# Patient Record
Sex: MALE | Race: WHITE | NOT HISPANIC OR LATINO | Employment: FULL TIME | URBAN - METROPOLITAN AREA
[De-identification: names, ages, dates, MRNs, and addresses within clinical notes are randomized per-mention and may not be internally consistent; named-entity substitution may affect disease eponyms.]

---

## 2017-01-11 ENCOUNTER — APPOINTMENT (OUTPATIENT)
Dept: GENERAL RADIOLOGY | Facility: HOSPITAL | Age: 57
End: 2017-01-11

## 2017-01-11 ENCOUNTER — OFFICE VISIT (OUTPATIENT)
Dept: FAMILY MEDICINE CLINIC | Facility: CLINIC | Age: 57
End: 2017-01-11

## 2017-01-11 ENCOUNTER — HOSPITAL ENCOUNTER (INPATIENT)
Facility: HOSPITAL | Age: 57
LOS: 3 days | Discharge: HOME | End: 2017-01-14
Attending: EMERGENCY MEDICINE | Admitting: INTERNAL MEDICINE

## 2017-01-11 ENCOUNTER — APPOINTMENT (OUTPATIENT)
Dept: CT IMAGING | Facility: HOSPITAL | Age: 57
End: 2017-01-11

## 2017-01-11 VITALS
DIASTOLIC BLOOD PRESSURE: 80 MMHG | BODY MASS INDEX: 48.32 KG/M2 | HEIGHT: 64 IN | TEMPERATURE: 98 F | OXYGEN SATURATION: 95 % | HEART RATE: 69 BPM | SYSTOLIC BLOOD PRESSURE: 118 MMHG | RESPIRATION RATE: 16 BRPM | WEIGHT: 283 LBS

## 2017-01-11 DIAGNOSIS — J11.00 PNEUMONIA DUE TO INFLUENZA: ICD-10-CM

## 2017-01-11 DIAGNOSIS — R55 NEAR SYNCOPE: ICD-10-CM

## 2017-01-11 DIAGNOSIS — I48.91 ATRIAL FIBRILLATION, UNSPECIFIED TYPE (HCC): ICD-10-CM

## 2017-01-11 DIAGNOSIS — J06.9 ACUTE URI: Primary | ICD-10-CM

## 2017-01-11 DIAGNOSIS — J40 BRONCHITIS: ICD-10-CM

## 2017-01-11 DIAGNOSIS — R53.1 WEAKNESS GENERALIZED: ICD-10-CM

## 2017-01-11 DIAGNOSIS — J18.9 PNEUMONIA OF LEFT LOWER LOBE DUE TO INFECTIOUS ORGANISM: ICD-10-CM

## 2017-01-11 DIAGNOSIS — N28.9 RENAL INSUFFICIENCY: Primary | ICD-10-CM

## 2017-01-11 LAB
ALBUMIN SERPL-MCNC: 4.5 G/DL (ref 3.5–5.2)
ALBUMIN/GLOB SERPL: 1.6 G/DL
ALP SERPL-CCNC: 83 U/L (ref 39–117)
ALT SERPL W P-5'-P-CCNC: 32 U/L (ref 1–41)
ANION GAP SERPL CALCULATED.3IONS-SCNC: 13.1 MMOL/L
AST SERPL-CCNC: 29 U/L (ref 1–40)
B PERT DNA SPEC QL NAA+PROBE: NOT DETECTED
BASOPHILS # BLD AUTO: 0.02 10*3/MM3 (ref 0–0.2)
BASOPHILS NFR BLD AUTO: 0.2 % (ref 0–1.5)
BILIRUB SERPL-MCNC: 0.5 MG/DL (ref 0.1–1.2)
BUN BLD-MCNC: 22 MG/DL (ref 6–20)
BUN/CREAT SERPL: 12.4 (ref 7–25)
C PNEUM DNA NPH QL NAA+NON-PROBE: NOT DETECTED
CALCIUM SPEC-SCNC: 9.5 MG/DL (ref 8.6–10.5)
CHLORIDE SERPL-SCNC: 101 MMOL/L (ref 98–107)
CO2 SERPL-SCNC: 28.9 MMOL/L (ref 22–29)
CREAT BLD-MCNC: 1.78 MG/DL (ref 0.76–1.27)
D-LACTATE SERPL-SCNC: 1.1 MMOL/L (ref 0.5–2)
DEPRECATED RDW RBC AUTO: 46.5 FL (ref 37–54)
EOSINOPHIL # BLD AUTO: 0.06 10*3/MM3 (ref 0–0.7)
EOSINOPHIL NFR BLD AUTO: 0.7 % (ref 0.3–6.2)
ERYTHROCYTE [DISTWIDTH] IN BLOOD BY AUTOMATED COUNT: 13.1 % (ref 11.5–14.5)
FLUAV H1 2009 PAND RNA NPH QL NAA+PROBE: NOT DETECTED
FLUAV H1 HA GENE NPH QL NAA+PROBE: NOT DETECTED
FLUAV H3 RNA NPH QL NAA+PROBE: NOT DETECTED
FLUAV SUBTYP SPEC NAA+PROBE: NOT DETECTED
FLUBV RNA ISLT QL NAA+PROBE: NOT DETECTED
GFR SERPL CREATININE-BSD FRML MDRD: 39 ML/MIN/1.73
GLOBULIN UR ELPH-MCNC: 2.9 GM/DL
GLUCOSE BLD-MCNC: 128 MG/DL (ref 65–99)
HADV DNA SPEC NAA+PROBE: NOT DETECTED
HCOV 229E RNA SPEC QL NAA+PROBE: NOT DETECTED
HCOV HKU1 RNA SPEC QL NAA+PROBE: NOT DETECTED
HCOV NL63 RNA SPEC QL NAA+PROBE: NOT DETECTED
HCOV OC43 RNA SPEC QL NAA+PROBE: DETECTED
HCT VFR BLD AUTO: 51.1 % (ref 40.4–52.2)
HGB BLD-MCNC: 17.1 G/DL (ref 13.7–17.6)
HMPV RNA NPH QL NAA+NON-PROBE: NOT DETECTED
HOLD SPECIMEN: NORMAL
HPIV1 RNA SPEC QL NAA+PROBE: NOT DETECTED
HPIV2 RNA SPEC QL NAA+PROBE: NOT DETECTED
HPIV3 RNA NPH QL NAA+PROBE: NOT DETECTED
HPIV4 P GENE NPH QL NAA+PROBE: NOT DETECTED
IMM GRANULOCYTES # BLD: 0.03 10*3/MM3 (ref 0–0.03)
IMM GRANULOCYTES NFR BLD: 0.4 % (ref 0–0.5)
INR PPP: 0.97 (ref 0.9–1.1)
LYMPHOCYTES # BLD AUTO: 1.24 10*3/MM3 (ref 0.9–4.8)
LYMPHOCYTES NFR BLD AUTO: 15 % (ref 19.6–45.3)
M PNEUMO IGG SER IA-ACNC: NOT DETECTED
MCH RBC QN AUTO: 32.4 PG (ref 27–32.7)
MCHC RBC AUTO-ENTMCNC: 33.5 G/DL (ref 32.6–36.4)
MCV RBC AUTO: 97 FL (ref 79.8–96.2)
MONOCYTES # BLD AUTO: 1.15 10*3/MM3 (ref 0.2–1.2)
MONOCYTES NFR BLD AUTO: 13.9 % (ref 5–12)
NEUTROPHILS # BLD AUTO: 5.78 10*3/MM3 (ref 1.9–8.1)
NEUTROPHILS NFR BLD AUTO: 69.8 % (ref 42.7–76)
NT-PROBNP SERPL-MCNC: 1118 PG/ML (ref 5–900)
PLATELET # BLD AUTO: 311 10*3/MM3 (ref 140–500)
PMV BLD AUTO: 11 FL (ref 6–12)
POTASSIUM BLD-SCNC: 4.4 MMOL/L (ref 3.5–5.2)
PROCALCITONIN SERPL-MCNC: 0.14 NG/ML (ref 0.1–0.25)
PROT SERPL-MCNC: 7.4 G/DL (ref 6–8.5)
PROTHROMBIN TIME: 12.5 SECONDS (ref 11.7–14.2)
RBC # BLD AUTO: 5.27 10*6/MM3 (ref 4.6–6)
RHINOVIRUS RNA SPEC NAA+PROBE: NOT DETECTED
RSV RNA NPH QL NAA+NON-PROBE: DETECTED
SODIUM BLD-SCNC: 143 MMOL/L (ref 136–145)
TROPONIN T SERPL-MCNC: <0.01 NG/ML (ref 0–0.03)
WBC NRBC COR # BLD: 8.28 10*3/MM3 (ref 4.5–10.7)
WHOLE BLOOD HOLD SPECIMEN: NORMAL

## 2017-01-11 PROCEDURE — 87633 RESP VIRUS 12-25 TARGETS: CPT | Performed by: EMERGENCY MEDICINE

## 2017-01-11 PROCEDURE — 25010000002 METHYLPREDNISOLONE PER 125 MG: Performed by: EMERGENCY MEDICINE

## 2017-01-11 PROCEDURE — 93005 ELECTROCARDIOGRAM TRACING: CPT

## 2017-01-11 PROCEDURE — 84145 PROCALCITONIN (PCT): CPT | Performed by: EMERGENCY MEDICINE

## 2017-01-11 PROCEDURE — 36415 COLL VENOUS BLD VENIPUNCTURE: CPT

## 2017-01-11 PROCEDURE — 83605 ASSAY OF LACTIC ACID: CPT | Performed by: EMERGENCY MEDICINE

## 2017-01-11 PROCEDURE — 80053 COMPREHEN METABOLIC PANEL: CPT | Performed by: EMERGENCY MEDICINE

## 2017-01-11 PROCEDURE — 83880 ASSAY OF NATRIURETIC PEPTIDE: CPT | Performed by: EMERGENCY MEDICINE

## 2017-01-11 PROCEDURE — 36415 COLL VENOUS BLD VENIPUNCTURE: CPT | Performed by: EMERGENCY MEDICINE

## 2017-01-11 PROCEDURE — 99284 EMERGENCY DEPT VISIT MOD MDM: CPT

## 2017-01-11 PROCEDURE — 87798 DETECT AGENT NOS DNA AMP: CPT | Performed by: EMERGENCY MEDICINE

## 2017-01-11 PROCEDURE — 25010000002 ENOXAPARIN PER 10 MG: Performed by: EMERGENCY MEDICINE

## 2017-01-11 PROCEDURE — 85610 PROTHROMBIN TIME: CPT | Performed by: EMERGENCY MEDICINE

## 2017-01-11 PROCEDURE — 93010 ELECTROCARDIOGRAM REPORT: CPT | Performed by: INTERNAL MEDICINE

## 2017-01-11 PROCEDURE — 94640 AIRWAY INHALATION TREATMENT: CPT

## 2017-01-11 PROCEDURE — 87040 BLOOD CULTURE FOR BACTERIA: CPT | Performed by: EMERGENCY MEDICINE

## 2017-01-11 PROCEDURE — 85025 COMPLETE CBC W/AUTO DIFF WBC: CPT | Performed by: EMERGENCY MEDICINE

## 2017-01-11 PROCEDURE — 71020 HC CHEST PA AND LATERAL: CPT

## 2017-01-11 PROCEDURE — 87581 M.PNEUMON DNA AMP PROBE: CPT | Performed by: EMERGENCY MEDICINE

## 2017-01-11 PROCEDURE — 25010000002 LEVOFLOXACIN PER 250 MG: Performed by: EMERGENCY MEDICINE

## 2017-01-11 PROCEDURE — 99214 OFFICE O/P EST MOD 30 MIN: CPT | Performed by: FAMILY MEDICINE

## 2017-01-11 PROCEDURE — 84484 ASSAY OF TROPONIN QUANT: CPT | Performed by: EMERGENCY MEDICINE

## 2017-01-11 PROCEDURE — 87486 CHLMYD PNEUM DNA AMP PROBE: CPT | Performed by: EMERGENCY MEDICINE

## 2017-01-11 PROCEDURE — 71250 CT THORAX DX C-: CPT

## 2017-01-11 PROCEDURE — 94799 UNLISTED PULMONARY SVC/PX: CPT

## 2017-01-11 RX ORDER — METHYLPREDNISOLONE SODIUM SUCCINATE 125 MG/2ML
125 INJECTION, POWDER, LYOPHILIZED, FOR SOLUTION INTRAMUSCULAR; INTRAVENOUS ONCE
Status: COMPLETED | OUTPATIENT
Start: 2017-01-11 | End: 2017-01-11

## 2017-01-11 RX ORDER — LEVOFLOXACIN 5 MG/ML
750 INJECTION, SOLUTION INTRAVENOUS ONCE
Status: COMPLETED | OUTPATIENT
Start: 2017-01-11 | End: 2017-01-11

## 2017-01-11 RX ORDER — IPRATROPIUM BROMIDE AND ALBUTEROL SULFATE 2.5; .5 MG/3ML; MG/3ML
3 SOLUTION RESPIRATORY (INHALATION) ONCE
Status: COMPLETED | OUTPATIENT
Start: 2017-01-11 | End: 2017-01-11

## 2017-01-11 RX ORDER — SODIUM CHLORIDE 9 MG/ML
125 INJECTION, SOLUTION INTRAVENOUS CONTINUOUS
Status: DISCONTINUED | OUTPATIENT
Start: 2017-01-11 | End: 2017-01-12

## 2017-01-11 RX ORDER — SODIUM CHLORIDE 0.9 % (FLUSH) 0.9 %
10 SYRINGE (ML) INJECTION AS NEEDED
Status: DISCONTINUED | OUTPATIENT
Start: 2017-01-11 | End: 2017-01-14 | Stop reason: HOSPADM

## 2017-01-11 RX ADMIN — SODIUM CHLORIDE 1000 ML: 9 INJECTION, SOLUTION INTRAVENOUS at 21:22

## 2017-01-11 RX ADMIN — IPRATROPIUM BROMIDE AND ALBUTEROL SULFATE 3 ML: .5; 3 SOLUTION RESPIRATORY (INHALATION) at 21:37

## 2017-01-11 RX ADMIN — SODIUM CHLORIDE 125 ML/HR: 9 INJECTION, SOLUTION INTRAVENOUS at 21:28

## 2017-01-11 RX ADMIN — METHYLPREDNISOLONE SODIUM SUCCINATE 125 MG: 125 INJECTION, POWDER, FOR SOLUTION INTRAMUSCULAR; INTRAVENOUS at 21:22

## 2017-01-11 RX ADMIN — ENOXAPARIN SODIUM 130 MG: 80 INJECTION SUBCUTANEOUS at 21:30

## 2017-01-11 RX ADMIN — LEVOFLOXACIN 750 MG: 750 INJECTION, SOLUTION INTRAVENOUS at 21:27

## 2017-01-11 NOTE — PATIENT INSTRUCTIONS
Rest  Increase fluids   RTC prn or 1 week if not better   Take mucinex DM  Take Zyrtec or Claritin   OTC cough med prn      Ibuprofen for pain and fever control                                                        Tylenol(acetominophen) for pain and fever control    Saline nasal spray 2 sprays to each nostril 6 times daily  To er now

## 2017-01-11 NOTE — ED NOTES
Patient is sent by PCP for possible pneumonia. States that he has had a cough and URI-like symptoms for the last few weeks. States that he has been on antibiotics but that it is not helping any. Patient states that the cough is nonproductive. He denies fevers but complains of cold sweats.      Sarah Gomez RN  01/11/17 [id number]

## 2017-01-11 NOTE — MR AVS SNAPSHOT
Semaj Narvaez   1/11/2017 3:30 PM   Office Visit    Provider:  Luis Persaud MD   Department:  [facility]   Dept Phone:  [phone]                Your Full Care Plan              Today's Medication Changes          These changes are accurate as of: 1/11/17  5:23 PM.  If you have any questions, ask your nurse or doctor.               Stop taking medication(s)listed here:     amoxicillin-clavulanate 875-125 MG per tablet   Commonly known as:  AUGMENTIN   Stopped by:  Luis Persaud MD                      Your Updated Medication List          This list is accurate as of: 1/11/17  5:23 PM.  Always use your most recent med list.                fluticasone 50 MCG/ACT nasal spray   Commonly known as:  FLONASE   2 sprays into each nostril Daily for 30 days.       metoprolol succinate XL 50 MG 24 hr tablet   Commonly known as:  TOPROL-XL   Take 1 tablet by mouth Daily.       omeprazole OTC 20 MG EC tablet   Commonly known as:  PriLOSEC OTC       tadalafil 20 MG tablet   Commonly known as:  CIALIS               You Were Diagnosed With        Codes Comments    Acute URI    -  Primary ICD-10-CM: J06.9  ICD-9-CM: 465.9     Bronchitis     ICD-10-CM: J40  ICD-9-CM: 490     Pneumonia due to influenza     ICD-10-CM: J11.00  ICD-9-CM: 487.0     Weakness generalized     ICD-10-CM: R53.1  ICD-9-CM: 780.79       Instructions    Rest  Increase fluids   RTC prn or 1 week if not better   Take mucinex DM  Take Zyrtec or Claritin   OTC cough med prn      Ibuprofen for pain and fever control                                                        Tylenol(acetominophen) for pain and fever control    Saline nasal spray 2 sprays to each nostril 6 times daily  To er now         Patient Instructions History      MyChart Signup     [facility] allows you to send messages to your doctor, view your test results, renew your prescriptions, schedule appointments, and more. To sign up,  "go to [email or web address] and click on the Sign Up Now link in the New User? box. Enter your [employer] Activation Code exactly as it appears below along with the last four digits of your Social Security Number and your Date of Birth () to complete the sign-up process. If you do not sign up before the expiration date, you must request a new code.    [employer] Activation Code: [email or web address]  Expires: 2017  1:11 PM    If you have questions, you can email [email or web address] or call [phone] to talk to our [employer] staff. Remember, [employer] is NOT to be used for urgent needs. For medical emergencies, dial 911.               Other Info from Your Visit           Allergies     Lisinopril        Reason for Visit     Sinusitis     URI           Vital Signs     Blood Pressure Pulse Temperature Respirations Height Weight    118/80 69 98 °F (36.7 °C) (Oral) 16 64\" (162.6 cm) 283 lb (128 kg)    Oxygen Saturation Body Mass Index Smoking Status             95% 48.58 kg/m2 Never Smoker         Problems and Diagnoses Noted     Acute upper respiratory infection    -  Primary    Bronchitis        Pneumonia due to influenza        Weakness generalized          "

## 2017-01-11 NOTE — PROGRESS NOTES
"Subjective   Semaj Narvaez is a 56 y.o. male.     History of Present Illness   Chief Complaint:   Chief Complaint   Patient presents with   • Sinusitis   • URI       Semaj Narvaez 56 y.o. male who presents today for a 12 day follow up for URI and sinusitis. This has been present for days about 20 days. He has taken OTC Mucinex and I started him on Augmentin at his appointment on 12/30. He has finished the antibiotic but is still taking the Mucinex. He stated that he has gotten a lot worse. He complains of a severe cough and dizziness. He stated he has not eaten anything today.      he has a history of   Patient Active Problem List   Diagnosis   • Cellulitis and abscess   • GERD (gastroesophageal reflux disease)   • Hyperlipidemia   • Hypertension   • DDD (degenerative disc disease), lumbosacral   • Low back pain   • Spider bite   .  Since the last visit, he has overall felt well.  he has been compliant with   Current Outpatient Prescriptions:   •  fluticasone (FLONASE) 50 MCG/ACT nasal spray, 2 sprays into each nostril Daily for 30 days., Disp: 1 each, Rfl: 5  •  metoprolol succinate XL (TOPROL-XL) 50 MG 24 hr tablet, Take 1 tablet by mouth Daily., Disp: 30 tablet, Rfl: 5  •  omeprazole OTC (PriLOSEC OTC) 20 MG EC tablet, Take 20 mg by mouth As Needed., Disp: , Rfl:   •  tadalafil (CIALIS) 20 MG tablet, Take 20 mg by mouth As Needed. For 30 days , Disp: , Rfl:   •  amoxicillin-clavulanate (AUGMENTIN) 875-125 MG per tablet, Take 1 tablet by mouth 2 (Two) Times a Day., Disp: 20 tablet, Rfl: 0.  he denies medication side effects.    All of the chronic condition(s) listed above are stable w/o issues.    Visit Vitals   • /80   • Pulse 69   • Temp 98 °F (36.7 °C) (Oral)   • Resp 16   • Ht 64\" (162.6 cm)   • Wt 283 lb (128 kg)   • SpO2 95%   • BMI 48.58 kg/m2       Results for orders placed or performed in visit on 11/30/16   Comprehensive metabolic panel   Result Value Ref Range    Glucose 102 (H) 65 - 99 mg/dL    BUN 22 " (H) 6 - 20 mg/dL    Creatinine 1.15 0.76 - 1.27 mg/dL    eGFR Non African Am 65 >60 mL/min/1.73    eGFR African Am 79 >60 mL/min/1.73    BUN/Creatinine Ratio 19.1 7.0 - 25.0    Sodium 142 136 - 145 mmol/L    Potassium 4.6 3.5 - 5.2 mmol/L    Chloride 101 98 - 107 mmol/L    Total CO2 27.0 22.0 - 29.0 mmol/L    Calcium 9.3 8.6 - 10.5 mg/dL    Total Protein 6.8 6.0 - 8.5 g/dL    Albumin 4.50 3.50 - 5.20 g/dL    Globulin 2.3 gm/dL    A/G Ratio 2.0 g/dL    Total Bilirubin 0.6 0.1 - 1.2 mg/dL    Alkaline Phosphatase 84 39 - 117 U/L    AST (SGOT) 23 1 - 40 U/L    ALT (SGPT) 26 1 - 41 U/L   Lipid panel   Result Value Ref Range    Total Cholesterol 231 (H) 0 - 200 mg/dL    Triglycerides 99 0 - 150 mg/dL    HDL Cholesterol 46 40 - 60 mg/dL    VLDL Cholesterol 19.8 5 - 40 mg/dL    LDL Cholesterol  165 (H) 0 - 100 mg/dL   TSH   Result Value Ref Range    TSH 1.220 0.270 - 4.200 mIU/mL   CBC and Differential   Result Value Ref Range    WBC 7.63 4.50 - 10.70 10*3/mm3    RBC 4.80 4.60 - 6.00 10*6/mm3    Hemoglobin 15.3 13.7 - 17.6 g/dL    Hematocrit 46.2 40.4 - 52.2 %    MCV 96.3 (H) 79.8 - 96.2 fL    MCH 31.9 27.0 - 32.7 pg    MCHC 33.1 32.6 - 36.4 g/dL    RDW 12.8 11.5 - 14.5 %    Platelets 288 140 - 500 10*3/mm3    Neutrophil Rel % 66.3 42.7 - 76.0 %    Lymphocyte Rel % 22.3 19.6 - 45.3 %    Monocyte Rel % 7.1 5.0 - 12.0 %    Eosinophil Rel % 3.7 0.3 - 6.2 %    Basophil Rel % 0.3 0.0 - 1.5 %    Neutrophils Absolute 5.07 1.90 - 8.10 10*3/mm3    Lymphocytes Absolute 1.70 0.90 - 4.80 10*3/mm3    Monocytes Absolute 0.54 0.20 - 1.20 10*3/mm3    Eosinophils Absolute 0.28 0.00 - 0.70 10*3/mm3    Basophils Absolute 0.02 0.00 - 0.20 10*3/mm3    Immature Granulocyte Rel % 0.3 0.0 - 0.5 %    Immature Grans Absolute 0.02 0.00 - 0.03 10*3/mm3         The following portions of the patient's history were reviewed and updated as appropriate: allergies, current medications, past family history, past medical history, past social history, past  surgical history and problem list.    Review of Systems   Constitutional: Negative for activity change, appetite change and unexpected weight change.   HENT: Positive for congestion and sinus pressure.    Eyes: Negative for visual disturbance.   Respiratory: Positive for cough. Negative for chest tightness and shortness of breath.    Cardiovascular: Negative for chest pain and palpitations.   Skin: Negative for color change.   Neurological: Positive for dizziness. Negative for syncope and speech difficulty.   Psychiatric/Behavioral: Negative for confusion and decreased concentration.       Objective   Physical Exam   Constitutional: He is oriented to person, place, and time. He appears well-developed and well-nourished.   HENT:   Mouth/Throat: Oropharynx is clear and moist.   Eyes: Conjunctivae and EOM are normal. Pupils are equal, round, and reactive to light.   Neck: Normal range of motion. Neck supple.   Cardiovascular: Normal rate and regular rhythm.    Pulmonary/Chest:   Decreased bs and coarse rhonchi both bases   Abdominal: Soft.   Musculoskeletal: Normal range of motion.   Weakness  Has to walk with assistance   Neurological: He is oriented to person, place, and time.   Generalized weakness   Psychiatric: He has a normal mood and affect. His behavior is normal.   Vitals reviewed.      Assessment/Plan   Semaj was seen today for sinusitis and uri.    Diagnoses and all orders for this visit:    Acute URI    Bronchitis    Pneumonia due to influenza    Weakness generalized

## 2017-01-11 NOTE — IP AVS SNAPSHOT
AFTER VISIT SUMMARY             Semaj Narvaez           About your hospitalization     You were admitted on:  January 11, 2017 You last received care in the:  [address]       Procedures & Surgeries         Medications    If you or your caregiver advised us that you are currently taking a medication and that medication is marked below as “Resume”, this simply indicates that we have reviewed those medications to make sure our new therapy recommendations do not interfere.  If you have concerns about medications other than those new ones which we are prescribing today, please consult the physician who prescribed them (or your primary physician).  Our review of your home medications is not meant to indicate that we are directing their use.             Your Medications      START taking these medications     aspirin 325 MG EC tablet   Take 1 tablet by mouth Daily.   Last time this was given:  1/14/2017  8:28 AM           atorvastatin 40 MG tablet   Take 1 tablet by mouth Every Night.   Last time this was given:  1/13/2017 10:17 PM   Commonly known as:  LIPITOR           levoFLOXacin 750 MG tablet   Take 1 tablet by mouth Daily.   Commonly known as:  LEVAQUIN           metoprolol tartrate 25 MG tablet   Take 0.5 tablets by mouth Every 12 (Twelve) Hours.   Last time this was given:  1/14/2017  8:28 AM   Commonly known as:  LOPRESSOR             CONTINUE taking these medications     fluticasone 50 MCG/ACT nasal spray   2 sprays into each nostril Daily for 30 days.   Last time this was given:  1/14/2017  8:28 AM   Commonly known as:  FLONASE           omeprazole OTC 20 MG EC tablet   Take 20 mg by mouth As Needed.   Commonly known as:  PriLOSEC OTC             STOP taking these medications     metoprolol succinate XL 50 MG 24 hr tablet   Commonly known as:  TOPROL-XL           tadalafil 20 MG tablet   Commonly known as:  CIALIS                Where to Get Your Medications      Information about where to  get these medications is not yet available     ! Ask your nurse or doctor about these medications     aspirin 325 MG EC tablet    atorvastatin 40 MG tablet    levoFLOXacin 750 MG tablet    metoprolol tartrate 25 MG tablet                  Your Medications      Your Medication List           Morning Noon Evening Bedtime As Needed    aspirin 325 MG EC tablet   Take 1 tablet by mouth Daily.                                   atorvastatin 40 MG tablet   Take 1 tablet by mouth Every Night.   Commonly known as:  LIPITOR                                   fluticasone 50 MCG/ACT nasal spray   2 sprays into each nostril Daily for 30 days.   Commonly known as:  FLONASE                                   levoFLOXacin 750 MG tablet   Take 1 tablet by mouth Daily.   Commonly known as:  LEVAQUIN                                   metoprolol tartrate 25 MG tablet   Take 0.5 tablets by mouth Every 12 (Twelve) Hours.   Commonly known as:  LOPRESSOR                                      omeprazole OTC 20 MG EC tablet   Take 20 mg by mouth As Needed.   Commonly known as:  PriLOSEC OTC                                            Instructions for After Discharge        Discharge References/Attachments     ATORVASTATIN TABLETS (ENGLISH)    LEVOFLOXACIN TABLETS (ENGLISH)    ATRIAL FIBRILLATION (ENGLISH)       Follow-ups for After Discharge        Follow-up Information     Follow up with Luis Persaud MD. Call in 1 day(s).    Specialty:  Family Medicine    Why:  Call to make a one week follow up with Dr. Persaud.    Contact information:    [address]  [facility] [id number] [phone]          Follow up with Iram Louie MD. Call in 1 day(s).    Specialty:  Cardiology    Why:  Call Dr. Louie's office to schedule a stress test outpatient and to follow up.    Contact information:    [address] [facility] [address]  [facility] [id number] [phone]        Referrals and Follow-ups to Schedule     Follow-Up    As directed    pcp santos  next week. give # for dr andersen as she will attempt a stress test as outpt   Follow Up Details:  pcp santos next week. give # for dr andersen as she will attempt a stress test as outpt             [employer] Signup     [employer] allows you to send messages to your doctor, view your test results, renew your prescriptions, schedule appointments, and more. To sign up, go to [employer] and click on the Sign Up Now link in the New User? box. Enter your [employer] Activation Code exactly as it appears below along with the last four digits of your Social Security Number and your Date of Birth () to complete the sign-up process. If you do not sign up before the expiration date, you must request a new code.    [employer] Activation Code: [email or web address]  Expires: 2017  1:02 PM    If you have questions, you can email [email or web address] or call [phone] to talk to our [employer] staff. Remember, [employer] is NOT to be used for urgent needs. For medical emergencies, dial 911.           Summary of Your Hospitalization        Reason for Hospitalization     Your primary diagnosis was:  Pneumonia Of Left Lung Due To Infectious Organism    Your diagnoses also included:  Poor Kidney Function, New Onset A-Fib, High Blood Pressure, High Cholesterol Or Triglycerides, Noncompliance, Atrial Fibrillation (Irregular Heartbeat), Ckd (Chronic Kidney Disease) Stage 3, Gfr 30-59 Ml/Min      Care Providers     Provider Service Role Specialty    Dustin Hitchcock MD Internal Medicine Attending Provider Internal Medicine    Iram Andersen MD -- Consulting Physician  Cardiology     Dustin Hitchcock MD Internal Medicine Consulting Physician  Internal Medicine       Your Allergies  Date Reviewed: 2017    Allergen Reactions    Lisinopril Not Noted      Pending Labs     Order Current Status    Blood Culture Preliminary result    Blood Culture Preliminary result      Patient Belongings Returned     Document Return of  Belongings Flowsheet     Were the patient bedside belongings sent home?   Yes   Belongings Retrieved from Security & Sent Home   N/A    Belongings Sent to Safe   --   Medications Retrieved from Pharmacy & Sent Home   N/A              More Information      Atorvastatin tablets  What is this medicine?  ATORVASTATIN (a TORE va sta tin) is known as a HMG-CoA reductase inhibitor or 'statin'. It lowers the level of cholesterol and triglycerides in the blood. This drug may also reduce the risk of heart attack, stroke, or other health problems in patients with risk factors for heart disease. Diet and lifestyle changes are often used with this drug.  This medicine may be used for other purposes; ask your health care provider or pharmacist if you have questions.  What should I tell my health care provider before I take this medicine?  They need to know if you have any of these conditions:  -frequently drink alcoholic beverages  -history of stroke, TIA  -kidney disease  -liver disease  -muscle aches or weakness  -other medical condition  -an unusual or allergic reaction to atorvastatin, other medicines, foods, dyes, or preservatives  -pregnant or trying to get pregnant  -breast-feeding  How should I use this medicine?  Take this medicine by mouth with a glass of water. Follow the directions on the prescription label. You can take this medicine with or without food. Take your doses at regular intervals. Do not take your medicine more often than directed.  Talk to your pediatrician regarding the use of this medicine in children. While this drug may be prescribed for children as young as 10 years old for selected conditions, precautions do apply.  Overdosage: If you think you have taken too much of this medicine contact a poison control center or emergency room at once.  NOTE: This medicine is only for you. Do not share this medicine with others.  What if I miss a dose?  If you miss a dose, take it as soon as you can. If it is  almost time for your next dose, take only that dose. Do not take double or extra doses.  What may interact with this medicine?  Do not take this medicine with any of the following medications:  -red yeast rice  -telaprevir  -telithromycin  -voriconazole  This medicine may also interact with the following medications:  -alcohol  -antiviral medicines for HIV or AIDS  -boceprevir  -certain antibiotics like clarithromycin, erythromycin, troleandomycin  -certain medicines for cholesterol like fenofibrate or gemfibrozil  -cimetidine  -clarithromycin  -colchicine  -cyclosporine  -digoxin  -female hormones, like estrogens or progestins and birth control pills  -grapefruit juice  -medicines for fungal infections like fluconazole, itraconazole, ketoconazole  -niacin  -rifampin  -spironolactone  This list may not describe all possible interactions. Give your health care provider a list of all the medicines, herbs, non-prescription drugs, or dietary supplements you use. Also tell them if you smoke, drink alcohol, or use illegal drugs. Some items may interact with your medicine.  What should I watch for while using this medicine?  Visit your doctor or health care professional for regular check-ups. You may need regular tests to make sure your liver is working properly.  Tell your doctor or health care professional right away if you get any unexplained muscle pain, tenderness, or weakness, especially if you also have a fever and tiredness. Your doctor or health care professional may tell you to stop taking this medicine if you develop muscle problems. If your muscle problems do not go away after stopping this medicine, contact your health care professional.  This drug is only part of a total heart-health program. Your doctor or a dietician can suggest a low-cholesterol and low-fat diet to help. Avoid alcohol and smoking, and keep a proper exercise schedule.  Do not use this drug if you are pregnant or breast-feeding. Serious side  effects to an unborn child or to an infant are possible. Talk to your doctor or pharmacist for more information.  This medicine may affect blood sugar levels. If you have diabetes, check with your doctor or health care professional before you change your diet or the dose of your diabetic medicine.  If you are going to have surgery tell your health care professional that you are taking this drug.  What side effects may I notice from receiving this medicine?  Side effects that you should report to your doctor or health care professional as soon as possible:  -allergic reactions like skin rash, itching or hives, swelling of the face, lips, or tongue  -dark urine  -fever  -joint pain  -muscle cramps, pain  -redness, blistering, peeling or loosening of the skin, including inside the mouth  -trouble passing urine or change in the amount of urine  -unusually weak or tired  -yellowing of eyes or skin  Side effects that usually do not require medical attention (report to your doctor or health care professional if they continue or are bothersome):  -constipation  -heartburn  -stomach gas, pain, upset  This list may not describe all possible side effects. Call your doctor for medical advice about side effects. You may report side effects to FDA at 4-972-FDA-5278.  Where should I keep my medicine?  Keep out of the reach of children.  Store at room temperature between 20 to 25 degrees C (68 to 77 degrees F). Throw away any unused medicine after the expiration date.  NOTE: This sheet is a summary. It may not cover all possible information. If you have questions about this medicine, talk to your doctor, pharmacist, or health care provider.     © 2016, Elsevier/[employer] Standard. (2012-11-06 09:18:24)          Levofloxacin tablets  What is this medicine?  LEVOFLOXACIN (kwesi voe FLOX a sin) is a quinolone antibiotic. It is used to treat certain kinds of bacterial infections. It will not work for colds, flu, or other viral  infections.  This medicine may be used for other purposes; ask your health care provider or pharmacist if you have questions.  What should I tell my health care provider before I take this medicine?  They need to know if you have any of these conditions:  -bone problems  -cerebral disease  -history of low levels of potassium in the blood  -irregular heartbeat  -joint problems  -kidney disease  -myasthenia gravis  -seizures  -tendon problems  -tingling of the fingers or toes, or other nerve disorder  -an unusual or allergic reaction to levofloxacin, other quinolone antibiotics, foods, dyes, or preservatives  -pregnant or trying to get pregnant  -breast-feeding  How should I use this medicine?  Take this medicine by mouth with a full glass of water. Follow the directions on the prescription label. This medicine can be taken with or without food. Take your medicine at regular intervals. Do not take your medicine more often than directed. Do not skip doses or stop your medicine early even if you feel better. Do not stop taking except on your doctor's advice.  A special MedGuide will be given to you by the pharmacist with each prescription and refill. Be sure to read this information carefully each time.  Talk to your pediatrician regarding the use of this medicine in children. While this drug may be prescribed for children as young as 6 months for selected conditions, precautions do apply.  Overdosage: If you think you have taken too much of this medicine contact a poison control center or emergency room at once.  NOTE: This medicine is only for you. Do not share this medicine with others.  What if I miss a dose?  If you miss a dose, take it as soon as you remember. If it is almost time for your next dose, take only that dose. Do not take double or extra doses.  What may interact with this medicine?  Do not take this medicine with any of the following medications:  -arsenic  trioxide  -chloroquine  -droperidol  -medicines for irregular heart rhythm like amiodarone, disopyramide, dofetilide, flecainide, quinidine, procainamide, sotalol  -some medicines for depression or mental problems like phenothiazines, pimozide, and ziprasidone  This medicine may also interact with the following medications:  -amoxapine  -antacids  -birth control pills  -cisapride  -dairy products  -didanosine (ddI) buffered tablets or powder  -haloperidol  -multivitamins  -NSAIDS, medicines for pain and inflammation, like ibuprofen or naproxen  -retinoid products like tretinoin or isotretinoin  -risperidone  -some other antibiotics like clarithromycin or erythromycin  -sucralfate  -theophylline  -warfarin  This list may not describe all possible interactions. Give your health care provider a list of all the medicines, herbs, non-prescription drugs, or dietary supplements you use. Also tell them if you smoke, drink alcohol, or use illegal drugs. Some items may interact with your medicine.  What should I watch for while using this medicine?  Tell your doctor or health care professional if your symptoms do not improve or if they get worse. Drink several glasses of water a day and cut down on drinks that contain caffeine. You must not get dehydrated while taking this medicine.  You may get drowsy or dizzy. Do not drive, use machinery, or do anything that needs mental alertness until you know how this medicine affects you. Do not sit or stand up quickly, especially if you are an older patient. This reduces the risk of dizzy or fainting spells.  This medicine can make you more sensitive to the sun. Keep out of the sun. If you cannot avoid being in the sun, wear protective clothing and use a sunscreen. Do not use sun lamps or tanning beds/booths. Contact your doctor if you get a sunburn.  If you are a diabetic monitor your blood glucose carefully. If you get an unusual reading stop taking this medicine and call your doctor  right away.  Do not treat diarrhea with over-the-counter products. Contact your doctor if you have diarrhea that lasts more than 2 days or if the diarrhea is severe and watery.  Avoid antacids, calcium, iron, and zinc products for 2 hours before and 2 hours after taking a dose of this medicine.  What side effects may I notice from receiving this medicine?  Side effects that you should report to your doctor or health care professional as soon as possible:  -allergic reactions like skin rash or hives, swelling of the face, lips, or tongue  -anxious  -confusion  -depressed mood  -diarrhea  -fast, irregular heartbeat  -hallucination, loss of contact with reality  -joint, muscle, or tendon pain or swelling  -pain, tingling, numbness in the hands or feet  -suicidal thoughts or other mood changes  -sunburn  -unusually weak or tired  Side effects that usually do not require medical attention (report to your doctor or health care professional if they continue or are bothersome):  -dry mouth  -headache  -nausea  -trouble sleeping  This list may not describe all possible side effects. Call your doctor for medical advice about side effects. You may report side effects to FDA at 8-868-FDA-3849.  Where should I keep my medicine?  Keep out of the reach of children.  Store at room temperature between 15 and 30 degrees C (59 and 86 degrees F). Keep in a tightly closed container. Throw away any unused medicine after the expiration date.  NOTE: This sheet is a summary. It may not cover all possible information. If you have questions about this medicine, talk to your doctor, pharmacist, or health care provider.     © 2016, Elsevier/Gold Standard. (2016-07-28 12:40:18)          Atrial Fibrillation  Atrial fibrillation is a type of irregular or rapid heartbeat (arrhythmia). In atrial fibrillation, the heart quivers continuously in a chaotic pattern. This occurs when parts of the heart receive disorganized signals that make the heart  unable to pump blood normally. This can increase the risk for stroke, heart failure, and other heart-related conditions. There are different types of atrial fibrillation, including:  · Paroxysmal atrial fibrillation. This type starts suddenly, and it usually stops on its own shortly after it starts.  · Persistent atrial fibrillation. This type often lasts longer than a week. It may stop on its own or with treatment.  · Long-lasting persistent atrial fibrillation. This type lasts longer than 12 months.  · Permanent atrial fibrillation. This type does not go away.  Talk with your health care provider to learn about the type of atrial fibrillation that you have.  CAUSES  This condition is caused by some heart-related conditions or procedures, including:  · A heart attack.  · Coronary artery disease.  · Heart failure.  · Heart valve conditions.  · High blood pressure.  · Inflammation of the sac that surrounds the heart (pericarditis).  · Heart surgery.  · Certain heart rhythm disorders, such as Collier-Parkinson-White syndrome.  Other causes include:  · Pneumonia.  · Obstructive sleep apnea.  · Blockage of an artery in the lungs (pulmonary embolism, or PE).  · Lung cancer.  · Chronic lung disease.  · Thyroid problems, especially if the thyroid is overactive (hyperthyroidism).  · Caffeine.  · Excessive alcohol use or illegal drug use.  · Use of some medicines, including certain decongestants and diet pills.  Sometimes, the cause cannot be found.  RISK FACTORS  This condition is more likely to develop in:  · People who are older in age.  · People who smoke.  · People who have diabetes mellitus.  · People who are overweight (obese).  · Athletes who exercise vigorously.  SYMPTOMS  Symptoms of this condition include:  · A feeling that your heart is beating rapidly or irregularly.  · A feeling of discomfort or pain in your chest.  · Shortness of breath.  · Sudden light-headedness or weakness.  · Getting tired easily during  exercise.  In some cases, there are no symptoms.  DIAGNOSIS  Your health care provider may be able to detect atrial fibrillation when taking your pulse. If detected, this condition may be diagnosed with:  · An electrocardiogram (ECG).  · A Holter monitor test that records your heartbeat patterns over a 24-hour period.  · Transthoracic echocardiogram (TTE) to evaluate how blood flows through your heart.  · Transesophageal echocardiogram (JUAN MANUEL) to view more detailed images of your heart.  · A stress test.  · Imaging tests, such as a CT scan or chest X-ray.  · Blood tests.  TREATMENT  The main goals of treatment are to prevent blood clots from forming and to keep your heart beating at a normal rate and rhythm. The type of treatment that you receive depends on many factors, such as your underlying medical conditions and how you feel when you are experiencing atrial fibrillation.  This condition may be treated with:  · Medicine to slow down the heart rate, bring the heart's rhythm back to normal, or prevent clots from forming.  · Electrical cardioversion. This is a procedure that resets your heart's rhythm by delivering a controlled, low-energy shock to the heart through your skin.  · Different types of ablation, such as catheter ablation, catheter ablation with pacemaker, or surgical ablation. These procedures destroy the heart tissues that send abnormal signals. When the pacemaker is used, it is placed under your skin to help your heart beat in a regular rhythm.  HOME CARE INSTRUCTIONS  · Take over-the counter and prescription medicines only as told by your health care provider.  · If your health care provider prescribed a blood-thinning medicine (anticoagulant), take it exactly as told. Taking too much blood-thinning medicine can cause bleeding. If you do not take enough blood-thinning medicine, you will not have the protection that you need against stroke and other problems.  · Do not use tobacco products, including  cigarettes, chewing tobacco, and e-cigarettes. If you need help quitting, ask your health care provider.  · If you have obstructive sleep apnea, manage your condition as told by your health care provider.  · Do not drink alcohol.  · Do not drink beverages that contain caffeine, such as coffee, soda, and tea.  · Maintain a healthy weight. Do not use diet pills unless your health care provider approves. Diet pills may make heart problems worse.  · Follow diet instructions as told by your health care provider.  · Exercise regularly as told by your health care provider.  · Keep all follow-up visits as told by your health care provider. This is important.  PREVENTION  · Avoid drinking beverages that contain caffeine or alcohol.  · Avoid certain medicines, especially medicines that are used for breathing problems.  · Avoid certain herbs and herbal medicines, such as those that contain ephedra or ginseng.  · Do not use illegal drugs, such as cocaine and amphetamines.  · Do not smoke.  · Manage your high blood pressure.  SEEK MEDICAL CARE IF:  · You notice a change in the rate, rhythm, or strength of your heartbeat.  · You are taking an anticoagulant and you notice increased bruising.  · You tire more easily when you exercise or exert yourself.  SEEK IMMEDIATE MEDICAL CARE IF:  · You have chest pain, abdominal pain, sweating, or weakness.  · You feel nauseous.  · You notice blood in your vomit, bowel movement, or urine.  · You have shortness of breath.  · You suddenly have swollen feet and ankles.  · You feel dizzy.  · You have sudden weakness or numbness of the face, arm, or leg, especially on one side of the body.  · You have trouble speaking, trouble understanding, or both (aphasia).  · Your face or your eyelid droops on one side.  These symptoms may represent a serious problem that is an emergency. Do not wait to see if the symptoms will go away. Get medical help right away. Call your local emergency services (283 in  the [place] Do not drive yourself to the hospital.     This information is not intended to replace advice given to you by your health care provider. Make sure you discuss any questions you have with your health care provider.     Document Released: 12/18/2006 Document Revised: 09/07/2016 Document Reviewed: 04/13/2016  "[employer] Interactive Patient Education ©2016 [employer].            SYMPTOMS OF A STROKE    Call 911 or have someone take you to the Emergency Department if you have any of the following:    · Sudden numbness or weakness of your face, arm or leg especially on one side of the body  · Sudden confusion, diffiiculty speaking or trouble understanding   · Changes in your vision or loss of sight in one eye  · Sudden severe headache with no known cause  · sudden dizziness, trouble walking, loss of balance or coordination    It is important to seek emergency care right away if you have further stroke symptoms. If you get emergency help quickly, the powerful clot-dissolving medicines can reduce the disabilities caused by a stroke.     For more information:    American Stroke Association  4-015-2-STROKE  www.strokeassociation.org           IF YOU SMOKE OR USE TOBACCO PLEASE READ THE FOLLOWING:    Why is smoking bad for me?  Smoking increases the risk of heart disease, lung disease, vascular disease, stroke, and cancer.     If you smoke, STOP!    If you would like more information on quitting smoking, please visit the [employer] website: [email or web address]   This link will provide additional resources including the QUIT line and the Beat the Pack support groups.     For more information:    American Cancer Society  ([phone] 198-1788    American Heart Association  1-489.524.1296               YOU ARE THE MOST IMPORTANT FACTOR IN YOUR RECOVERY.     Follow all instructions carefully.     I have reviewed my discharge instructions with my nurse, including the following  information, if applicable:     Information about my illness and diagnosis   Follow up appointments (including lab draws)   Wound Care   Equipment Needs   Medications (new and continuing) along with side effects   Preventative information such as vaccines and smoking cessations   Diet   Pain   I know when to contact my Doctor's office or seek emergency care      I want my nurse to describe the side effects of my medications: YES NO   If the answer is no, I understand the side effects of my medications: YES NO   My nurse described the side effects of my medications in a way that I could understand: YES NO   I have taken my personal belongings and my own medications with me at discharge: YES NO            I have received this information and my questions have been answered. I have discussed any concerns I see with this plan with the nurse or physician. I understand these instructions.    Signature of Patient or Responsible Person: _____________________________________    Date: _________________  Time: __________________    Signature of Healthcare Provider: _______________________________________  Date: _________________  Time: __________________

## 2017-01-12 PROBLEM — I48.91 NEW ONSET A-FIB (HCC): Status: ACTIVE | Noted: 2017-01-12

## 2017-01-12 PROBLEM — J18.9 PNEUMONIA OF LEFT LUNG DUE TO INFECTIOUS ORGANISM: Status: ACTIVE | Noted: 2017-01-12

## 2017-01-12 LAB
ANION GAP SERPL CALCULATED.3IONS-SCNC: 13.9 MMOL/L
BASOPHILS # BLD AUTO: 0.01 10*3/MM3 (ref 0–0.2)
BASOPHILS NFR BLD AUTO: 0.2 % (ref 0–1.5)
BILIRUB UR QL STRIP: NEGATIVE
BUN BLD-MCNC: 27 MG/DL (ref 6–20)
BUN/CREAT SERPL: 21.4 (ref 7–25)
CALCIUM SPEC-SCNC: 8.5 MG/DL (ref 8.6–10.5)
CHLORIDE SERPL-SCNC: 105 MMOL/L (ref 98–107)
CHOLEST SERPL-MCNC: 191 MG/DL (ref 0–200)
CLARITY UR: ABNORMAL
CO2 SERPL-SCNC: 23.1 MMOL/L (ref 22–29)
COLOR UR: YELLOW
CREAT BLD-MCNC: 1.26 MG/DL (ref 0.76–1.27)
DEPRECATED RDW RBC AUTO: 49.2 FL (ref 37–54)
EOSINOPHIL # BLD AUTO: 0 10*3/MM3 (ref 0–0.7)
EOSINOPHIL NFR BLD AUTO: 0 % (ref 0.3–6.2)
ERYTHROCYTE [DISTWIDTH] IN BLOOD BY AUTOMATED COUNT: 13.4 % (ref 11.5–14.5)
GFR SERPL CREATININE-BSD FRML MDRD: 59 ML/MIN/1.73
GLUCOSE BLD-MCNC: 156 MG/DL (ref 65–99)
GLUCOSE UR STRIP-MCNC: NEGATIVE MG/DL
HCT VFR BLD AUTO: 46.8 % (ref 40.4–52.2)
HDLC SERPL-MCNC: 31 MG/DL (ref 40–60)
HGB BLD-MCNC: 14.9 G/DL (ref 13.7–17.6)
HGB UR QL STRIP.AUTO: NEGATIVE
IMM GRANULOCYTES # BLD: 0 10*3/MM3 (ref 0–0.03)
IMM GRANULOCYTES NFR BLD: 0 % (ref 0–0.5)
INR PPP: 1.08 (ref 0.9–1.1)
KETONES UR QL STRIP: NEGATIVE
LDLC SERPL CALC-MCNC: 139 MG/DL (ref 0–100)
LDLC/HDLC SERPL: 4.47 {RATIO}
LEUKOCYTE ESTERASE UR QL STRIP.AUTO: NEGATIVE
LYMPHOCYTES # BLD AUTO: 0.79 10*3/MM3 (ref 0.9–4.8)
LYMPHOCYTES NFR BLD AUTO: 17.3 % (ref 19.6–45.3)
MAGNESIUM SERPL-MCNC: 2.1 MG/DL (ref 1.6–2.6)
MCH RBC QN AUTO: 31.9 PG (ref 27–32.7)
MCHC RBC AUTO-ENTMCNC: 31.8 G/DL (ref 32.6–36.4)
MCV RBC AUTO: 100.2 FL (ref 79.8–96.2)
MONOCYTES # BLD AUTO: 0.11 10*3/MM3 (ref 0.2–1.2)
MONOCYTES NFR BLD AUTO: 2.4 % (ref 5–12)
NEUTROPHILS # BLD AUTO: 3.66 10*3/MM3 (ref 1.9–8.1)
NEUTROPHILS NFR BLD AUTO: 80.1 % (ref 42.7–76)
NITRITE UR QL STRIP: NEGATIVE
PH UR STRIP.AUTO: <=5 [PH] (ref 5–8)
PLATELET # BLD AUTO: 273 10*3/MM3 (ref 140–500)
PMV BLD AUTO: 10.8 FL (ref 6–12)
POTASSIUM BLD-SCNC: 4.3 MMOL/L (ref 3.5–5.2)
PROT UR QL STRIP: NEGATIVE
PROTHROMBIN TIME: 13.6 SECONDS (ref 11.7–14.2)
RBC # BLD AUTO: 4.67 10*6/MM3 (ref 4.6–6)
S PNEUM AG SPEC QL LA: NEGATIVE
SODIUM BLD-SCNC: 142 MMOL/L (ref 136–145)
SP GR UR STRIP: 1.03 (ref 1–1.03)
TRIGL SERPL-MCNC: 107 MG/DL (ref 0–150)
TROPONIN T SERPL-MCNC: <0.01 NG/ML (ref 0–0.03)
TSH SERPL DL<=0.05 MIU/L-ACNC: 0.38 MIU/ML (ref 0.27–4.2)
UROBILINOGEN UR QL STRIP: ABNORMAL
VLDLC SERPL-MCNC: 21.4 MG/DL (ref 5–40)
WBC NRBC COR # BLD: 4.57 10*3/MM3 (ref 4.5–10.7)

## 2017-01-12 PROCEDURE — 83735 ASSAY OF MAGNESIUM: CPT | Performed by: INTERNAL MEDICINE

## 2017-01-12 PROCEDURE — 81003 URINALYSIS AUTO W/O SCOPE: CPT | Performed by: INTERNAL MEDICINE

## 2017-01-12 PROCEDURE — 25010000002 LEVOFLOXACIN PER 250 MG: Performed by: INTERNAL MEDICINE

## 2017-01-12 PROCEDURE — 85610 PROTHROMBIN TIME: CPT | Performed by: INTERNAL MEDICINE

## 2017-01-12 PROCEDURE — 80048 BASIC METABOLIC PNL TOTAL CA: CPT | Performed by: INTERNAL MEDICINE

## 2017-01-12 PROCEDURE — 87899 AGENT NOS ASSAY W/OPTIC: CPT | Performed by: INTERNAL MEDICINE

## 2017-01-12 PROCEDURE — 99254 IP/OBS CNSLTJ NEW/EST MOD 60: CPT | Performed by: INTERNAL MEDICINE

## 2017-01-12 PROCEDURE — 93010 ELECTROCARDIOGRAM REPORT: CPT | Performed by: INTERNAL MEDICINE

## 2017-01-12 PROCEDURE — 80061 LIPID PANEL: CPT | Performed by: INTERNAL MEDICINE

## 2017-01-12 PROCEDURE — 93005 ELECTROCARDIOGRAM TRACING: CPT | Performed by: INTERNAL MEDICINE

## 2017-01-12 PROCEDURE — 25010000002 ENOXAPARIN PER 10 MG: Performed by: INTERNAL MEDICINE

## 2017-01-12 PROCEDURE — 84484 ASSAY OF TROPONIN QUANT: CPT | Performed by: INTERNAL MEDICINE

## 2017-01-12 PROCEDURE — 84443 ASSAY THYROID STIM HORMONE: CPT | Performed by: INTERNAL MEDICINE

## 2017-01-12 PROCEDURE — 85025 COMPLETE CBC W/AUTO DIFF WBC: CPT | Performed by: INTERNAL MEDICINE

## 2017-01-12 RX ORDER — SODIUM CHLORIDE 0.9 % (FLUSH) 0.9 %
1-10 SYRINGE (ML) INJECTION AS NEEDED
Status: DISCONTINUED | OUTPATIENT
Start: 2017-01-12 | End: 2017-01-14 | Stop reason: HOSPADM

## 2017-01-12 RX ORDER — ALBUTEROL SULFATE 2.5 MG/3ML
2.5 SOLUTION RESPIRATORY (INHALATION) EVERY 4 HOURS PRN
Status: DISCONTINUED | OUTPATIENT
Start: 2017-01-12 | End: 2017-01-14 | Stop reason: HOSPADM

## 2017-01-12 RX ORDER — PANTOPRAZOLE SODIUM 40 MG/1
40 TABLET, DELAYED RELEASE ORAL
Status: DISCONTINUED | OUTPATIENT
Start: 2017-01-12 | End: 2017-01-14 | Stop reason: HOSPADM

## 2017-01-12 RX ORDER — ACETAMINOPHEN 325 MG/1
650 TABLET ORAL EVERY 4 HOURS PRN
Status: DISCONTINUED | OUTPATIENT
Start: 2017-01-12 | End: 2017-01-14 | Stop reason: HOSPADM

## 2017-01-12 RX ORDER — SODIUM CHLORIDE 9 MG/ML
75 INJECTION, SOLUTION INTRAVENOUS CONTINUOUS
Status: DISCONTINUED | OUTPATIENT
Start: 2017-01-12 | End: 2017-01-13

## 2017-01-12 RX ORDER — ASPIRIN 81 MG/1
81 TABLET ORAL DAILY
Status: DISCONTINUED | OUTPATIENT
Start: 2017-01-12 | End: 2017-01-13

## 2017-01-12 RX ORDER — LEVOFLOXACIN 5 MG/ML
750 INJECTION, SOLUTION INTRAVENOUS EVERY 24 HOURS
Status: DISCONTINUED | OUTPATIENT
Start: 2017-01-12 | End: 2017-01-14 | Stop reason: HOSPADM

## 2017-01-12 RX ORDER — NITROGLYCERIN 0.4 MG/1
0.4 TABLET SUBLINGUAL
Status: DISCONTINUED | OUTPATIENT
Start: 2017-01-12 | End: 2017-01-14 | Stop reason: HOSPADM

## 2017-01-12 RX ORDER — HYDROCODONE BITARTRATE AND ACETAMINOPHEN 5; 325 MG/1; MG/1
1 TABLET ORAL EVERY 4 HOURS PRN
Status: DISCONTINUED | OUTPATIENT
Start: 2017-01-12 | End: 2017-01-14 | Stop reason: HOSPADM

## 2017-01-12 RX ORDER — FLUTICASONE PROPIONATE 50 MCG
2 SPRAY, SUSPENSION (ML) NASAL DAILY
Status: DISCONTINUED | OUTPATIENT
Start: 2017-01-12 | End: 2017-01-14 | Stop reason: HOSPADM

## 2017-01-12 RX ORDER — ATORVASTATIN CALCIUM 40 MG/1
40 TABLET, FILM COATED ORAL NIGHTLY
Status: DISCONTINUED | OUTPATIENT
Start: 2017-01-12 | End: 2017-01-14 | Stop reason: HOSPADM

## 2017-01-12 RX ADMIN — SODIUM CHLORIDE 75 ML/HR: 9 INJECTION, SOLUTION INTRAVENOUS at 05:05

## 2017-01-12 RX ADMIN — ASPIRIN 81 MG: 81 TABLET ORAL at 09:33

## 2017-01-12 RX ADMIN — ENOXAPARIN SODIUM 40 MG: 40 INJECTION SUBCUTANEOUS at 23:32

## 2017-01-12 RX ADMIN — METOPROLOL TARTRATE 25 MG: 25 TABLET ORAL at 02:14

## 2017-01-12 RX ADMIN — ATORVASTATIN CALCIUM 40 MG: 40 TABLET, FILM COATED ORAL at 02:14

## 2017-01-12 RX ADMIN — METOPROLOL TARTRATE 25 MG: 25 TABLET ORAL at 23:32

## 2017-01-12 RX ADMIN — FLUTICASONE PROPIONATE 2 SPRAY: 50 SPRAY, METERED NASAL at 09:33

## 2017-01-12 RX ADMIN — SODIUM CHLORIDE 75 ML/HR: 9 INJECTION, SOLUTION INTRAVENOUS at 18:47

## 2017-01-12 RX ADMIN — LEVOFLOXACIN 750 MG: 750 INJECTION, SOLUTION INTRAVENOUS at 23:33

## 2017-01-12 NOTE — ED NOTES
Report attempted, RN unavailable at this time, will try again.      Cammy Spence RN  01/11/17 4671

## 2017-01-12 NOTE — ED PROVIDER NOTES
"EMERGENCY DEPARTMENT ENCOUNTER       CHIEF COMPLAINT  Chief Complaint: Cough  History given by: Patient  History limited by: N/A  Room Number: 16/16  PMD: Luis Persaud MD      HPI:  HPI Comments: Pt c/o productive cough onset several weeks ago. Pt has also had decreased appetite, nausea, generalized weakness, and chest congestion, but denies CP. Pt saw his PMD for this several weeks ago and was started on an abx, the course of which pt finished with no sx relief. Pt again saw his PMD for this today and was referred to the ER for further evaluation as pt had a near-syncopal episode in his PMD's office (pt was diaphoretic, lightheaded, and nauseated - pt states that his near-syncopal episode has resolved). Pt denies prior h/o A-fib, but reports that he has had intermittent episodes of palpitations \"for several years\". There are no other complaints at this time.     Patient is a 56 y.o. male presenting with cough.   Cough   Cough characteristics:  Productive  Sputum characteristics:  Nondescript  Severity:  Moderate  Onset quality:  Gradual  Duration: onset several weeks ago.  Timing:  Intermittent  Progression:  Unchanged  Context: upper respiratory infection    Relieved by:  Nothing  Worsened by:  Nothing  Ineffective treatments: antibiotics.  Associated symptoms: diaphoresis (during near-syncopal episode - now resolved) and wheezing    Associated symptoms: no chest pain, no chills, no myalgias, no rash, no rhinorrhea and no sore throat          PAST MEDICAL HISTORY  Active Ambulatory Problems     Diagnosis Date Noted   • Cellulitis and abscess 06/13/2013   • GERD (gastroesophageal reflux disease) 05/31/2013   • Hyperlipidemia 05/31/2013   • Hypertension    • DDD (degenerative disc disease), lumbosacral 07/06/2012   • Low back pain 05/31/2013   • Spider bite 06/26/2012     Resolved Ambulatory Problems     Diagnosis Date Noted   • No Resolved Ambulatory Problems     Past Medical History   Diagnosis Date   • " "Encounter for eye exam 2011   • Erectile dysfunction    • Hydradenitis 12/22/2008         PAST SURGICAL HISTORY  Past Surgical History   Procedure Laterality Date   • Hernia repair           FAMILY HISTORY  Family History   Problem Relation Age of Onset   • Heart disease Mother    • Cancer Father    • Hypertension Other    • Lung disease Other          SOCIAL HISTORY  Social History     Social History   • Marital status: Single     Spouse name: N/A   • Number of children: N/A   • Years of education: N/A     Occupational History   • Not on file.     Social History Main Topics   • Smoking status: Never Smoker   • Smokeless tobacco: Not on file   • Alcohol use No   • Drug use: No   • Sexual activity: Not Currently     Other Topics Concern   • Not on file     Social History Narrative         ALLERGIES  Lisinopril        REVIEW OF SYSTEMS  Review of Systems   Constitutional: Positive for appetite change (decreased appetite) and diaphoresis (during near-syncopal episode - now resolved). Negative for chills and fatigue.   HENT: Positive for congestion (chest). Negative for rhinorrhea and sore throat.    Eyes: Negative for pain.   Respiratory: Positive for cough and wheezing.    Cardiovascular: Positive for palpitations (\"for several years\"). Negative for chest pain and leg swelling.   Gastrointestinal: Positive for nausea. Negative for abdominal pain, diarrhea and vomiting.   Genitourinary: Negative for difficulty urinating, dysuria, flank pain and frequency.   Musculoskeletal: Negative for arthralgias, myalgias, neck pain and neck stiffness.   Skin: Negative for rash.   Neurological: Positive for dizziness (lightheadedness - during near-syncopal episode - now resolved), weakness (generalized; not focal) and light-headedness (during near-syncopal episode - now resolved). Negative for speech difficulty and numbness.   Psychiatric/Behavioral: Negative.    All other systems reviewed and are negative.           PHYSICAL " EXAM  ED Triage Vitals   Temp Heart Rate Resp BP SpO2   01/11/17 1747 01/11/17 1747 01/11/17 1747 01/11/17 1751 01/11/17 1747   97 °F (36.1 °C) 83 20 132/97 95 % WNL      Temp src Heart Rate Source Patient Position BP Location FiO2 (%)   01/11/17 1747 01/11/17 1747 01/11/17 1751 01/11/17 1751 --   Tympanic Monitor Sitting Right arm        Physical Exam   Constitutional: He is oriented to person, place, and time. He appears distressed (mild).   HENT:   Head: Normocephalic.   Mouth/Throat: Mucous membranes are dry.   Eyes: EOM are normal. Pupils are equal, round, and reactive to light.   Neck: Normal range of motion. Neck supple. No JVD present.   Cardiovascular: Normal rate and normal heart sounds.  An irregularly irregular rhythm present.   Pulmonary/Chest: Effort normal. No respiratory distress. He has no decreased breath sounds. He has wheezes (bilaterally). He has no rhonchi. He has no rales.   Abdominal: Soft. There is no tenderness. There is no rebound and no guarding.   Musculoskeletal: Normal range of motion. He exhibits no edema (no BLE edema).   Neurological: He is alert and oriented to person, place, and time. He has normal sensation and normal strength.   Skin: Skin is warm and dry. There is pallor.   Psychiatric: Mood and affect normal.   Nursing note and vitals reviewed.          LAB RESULTS  Recent Results (from the past 24 hour(s))   Comprehensive Metabolic Panel    Collection Time: 01/11/17  5:56 PM   Result Value Ref Range    Glucose 128 (H) 65 - 99 mg/dL    BUN 22 (H) 6 - 20 mg/dL    Creatinine 1.78 (H) 0.76 - 1.27 mg/dL    Sodium 143 136 - 145 mmol/L    Potassium 4.4 3.5 - 5.2 mmol/L    Chloride 101 98 - 107 mmol/L    CO2 28.9 22.0 - 29.0 mmol/L    Calcium 9.5 8.6 - 10.5 mg/dL    Total Protein 7.4 6.0 - 8.5 g/dL    Albumin 4.50 3.50 - 5.20 g/dL    ALT (SGPT) 32 1 - 41 U/L    AST (SGOT) 29 1 - 40 U/L    Alkaline Phosphatase 83 39 - 117 U/L    Total Bilirubin 0.5 0.1 - 1.2 mg/dL    eGFR Non African  Amer 39 (L) >60 mL/min/1.73    Globulin 2.9 gm/dL    A/G Ratio 1.6 g/dL    BUN/Creatinine Ratio 12.4 7.0 - 25.0    Anion Gap 13.1 mmol/L   BNP    Collection Time: 01/11/17  5:56 PM   Result Value Ref Range    proBNP 1118.0 (H) 5.0 - 900.0 pg/mL   Troponin    Collection Time: 01/11/17  5:56 PM   Result Value Ref Range    Troponin T <0.010 0.000 - 0.030 ng/mL   Light Blue Top    Collection Time: 01/11/17  5:56 PM   Result Value Ref Range    Extra Tube hold for add-on    Gold Top - SST    Collection Time: 01/11/17  5:56 PM   Result Value Ref Range    Extra Tube Hold for add-ons.    CBC Auto Differential    Collection Time: 01/11/17  5:56 PM   Result Value Ref Range    WBC 8.28 4.50 - 10.70 10*3/mm3    RBC 5.27 4.60 - 6.00 10*6/mm3    Hemoglobin 17.1 13.7 - 17.6 g/dL    Hematocrit 51.1 40.4 - 52.2 %    MCV 97.0 (H) 79.8 - 96.2 fL    MCH 32.4 27.0 - 32.7 pg    MCHC 33.5 32.6 - 36.4 g/dL    RDW 13.1 11.5 - 14.5 %    RDW-SD 46.5 37.0 - 54.0 fl    MPV 11.0 6.0 - 12.0 fL    Platelets 311 140 - 500 10*3/mm3    Neutrophil % 69.8 42.7 - 76.0 %    Lymphocyte % 15.0 (L) 19.6 - 45.3 %    Monocyte % 13.9 (H) 5.0 - 12.0 %    Eosinophil % 0.7 0.3 - 6.2 %    Basophil % 0.2 0.0 - 1.5 %    Immature Grans % 0.4 0.0 - 0.5 %    Neutrophils, Absolute 5.78 1.90 - 8.10 10*3/mm3    Lymphocytes, Absolute 1.24 0.90 - 4.80 10*3/mm3    Monocytes, Absolute 1.15 0.20 - 1.20 10*3/mm3    Eosinophils, Absolute 0.06 0.00 - 0.70 10*3/mm3    Basophils, Absolute 0.02 0.00 - 0.20 10*3/mm3    Immature Grans, Absolute 0.03 0.00 - 0.03 10*3/mm3   Protime-INR    Collection Time: 01/11/17  9:21 PM   Result Value Ref Range    Protime 12.5 11.7 - 14.2 Seconds    INR 0.97 0.90 - 1.10   Lactic Acid, Plasma    Collection Time: 01/11/17  9:21 PM   Result Value Ref Range    Lactate 1.1 0.5 - 2.0 mmol/L   Procalcitonin    Collection Time: 01/11/17  9:21 PM   Result Value Ref Range    Procalcitonin 0.14 0.10 - 0.25 ng/mL   Respiratory Panel, PCR    Collection Time:  01/11/17  9:22 PM   Result Value Ref Range    ADENOVIRUS, PCR Not Detected Not Detected    Coronavirus 229E Not Detected Not Detected    Coronavirus HKU1 Not Detected Not Detected    Coronavirus NL63 Not Detected Not Detected    Coronavirus OC43 Detected (A) Not Detected    Human Metapneumovirus Not Detected Not Detected    Human Rhinovirus/Enterovirus Not Detected Not Detected    Influenza B PCR Not Detected Not Detected    Parainfluenza Virus 1 Not Detected Not Detected    Parainfluenza Virus 2 Not Detected Not Detected    Parainfluenza Virus 3 Not Detected Not Detected    Parainfluenza Virus 4 Not Detected Not Detected    Bordetella pertussis pcr Not Detected Not Detected    Influenza 2009 H1N1 by PCR Not Detected Not Detected    Chlamydophila pneumoniae PCR Not Detected Not Detected    Mycoplasma pneumo by PCR Not Detected Not Detected    Influenza A PCR Not Detected Not Detected    Influenza A H3 Not Detected Not Detected    Influenza A H1 Not Detected Not Detected    RSV, PCR Detected (A) Not Detected   Troponin    Collection Time: 01/12/17 12:50 AM   Result Value Ref Range    Troponin T <0.010 0.000 - 0.030 ng/mL       Ordered the above labs and reviewed the results.        RADIOLOGY  XR Chest 2 View   Final Result   Negative.       This report was finalized on 1/11/2017 7:10 PM by Dr. Milton Dowell MD. Interpreted by radiologist. Independently viewed by me.             CT Chest Without Contrast - Mild left lower lobe infiltrate. Interpreted by radiologist. Discussed with radiologist (Dr. Naik). Independently viewed by me.             Ordered the above noted radiological studies. Reviewed by me in PACS.       PROCEDURES  Procedures    EKG:    EKG time: 20:18  Rhythm/Rate: A-Fib rate 91  No Acute Ischemia  Non-Specific ST-T changes    changed compared to prior on 2015 (pt was in NSR rate at that time; A-fib is new)     Interpreted Contemporaneously by me.  Independently viewed by me          PROGRESS  AND CONSULTS  ED Course     9:00 PM: CT Chest, respiratory viral panel, blood cultures, lactic acid, and blood work ordered for further evaluation. IV fluids ordered to hydrate pt. Duo-neb and solumedrol ordered to improve breathing and decrease inflammation.     9:19 PM: Pt's CT Chest suggests that pt has a left lower lobe pneumonia. Ordered Levaquin for treatment. As pt is in new onset A-Fib, ordered Lovenox. Placed call to A for admission. Decision time to admit: now.     9:34 PM: Rechecked pt. Pt is resting. Informed pt that he is in new onset A-Fib for which I have ordered Lovenox. Pt's CT Chest suggests that pt has a left lower lobe pneumonia. Need for admission for further evaluation and IV abx. Pt agrees with plan.     9:42 PM: Discussed case with Dr. Alexander, hospitalist. He will admit pt to a telemetry bed.             MEDICAL DECISION MAKING      MDM  Number of Diagnoses or Management Options  Atrial fibrillation, unspecified type - new onset:   Near syncope: established and improving  Pneumonia of left lower lobe due to infectious organism:   Renal insufficiency:      Amount and/or Complexity of Data Reviewed  Clinical lab tests: ordered and reviewed (Creatinine is 1.79. Troponin is negative. WBC is 8.28.)  Tests in the radiology section of CPT®: ordered and reviewed (CXR is negative acute. )  Tests in the medicine section of CPT®: ordered and reviewed (EKG interpreted.   )  Discussion of test results with the performing providers: yes (CT Chest results d/w radiologist.   )  Decide to obtain previous medical records or to obtain history from someone other than the patient: yes  Review and summarize past medical records: yes (Pt's creatinine was 1.15 about 1 month ago. )  Discuss the patient with other providers: yes (Case d/w Dr. Alexander, hospitalist, who will admit pt to a telemetry bed.   )    Patient Progress  Patient progress: stable             DIAGNOSIS  Final diagnoses:   Renal insufficiency    Atrial fibrillation, unspecified type - new onset   Near syncope   Pneumonia of left lower lobe due to infectious organism         DISPOSITION  Pt admitted to telemetry.    ADMISSION    Discussed treatment plan and reason for admission with pt/family and admitting physician.  Pt/family voiced understanding of the plan for admission for further testing/treatment as needed.         Latest Documented Vital Signs:  As of 9:42 PM  BP- 131/86 HR- (P) 70 Temp- 96.9 °F (36.1 °C) (Tympanic) O2 sat- (P) 93%        --  Documentation assistance provided by tiana Escobar for Dr. Laquita MD.  Information recorded by the scriblanette was done at my direction and has been verified and validated by me.            Ramírez Escobar  01/11/17 1495       Javad Coelho MD  01/12/17 0214

## 2017-01-12 NOTE — PROGRESS NOTES
Discharge Planning Assessment  [facility]     Patient Name: Semaj Narvaez  MRN: [id number]  Today's Date: 1/12/2017    Admit Date: 1/11/2017          Discharge Needs Assessment       01/12/17 1028    Living Environment    Lives With other (see comments)    Living Arrangements house    Quality Of Family Relationships supportive    Able to Return to Prior Living Arrangements yes    Living Arrangement Comments Pt resides with his roommate in a single level house;     Discharge Needs Assessment    Concerns To Be Addressed denies needs/concerns at this time;no discharge needs identified    Equipment Currently Used at Home none    Equipment Needed After Discharge none    Transportation Available family or friend will provide    Discharge Disposition home or self-care    Discharge Planning Comments Pt plans home denies needs             Discharge Plan       01/12/17 1029    Case Management/Social Work Plan    Plan Pt plans home with roommate; denies needs    Additional Comments Met with pt and pt's roommate Yemi monteiro. Gave permission to talk in front of roommate. Pt reports he is independent in all ADLs. Has never used HH or SNF. Pt plans home at d/c. Denies any needs at this time. SARAH Lincoln        Discharge Placement     No information found                Demographic Summary       01/12/17 1028    Referral Information    Admission Type inpatient    Reason For Consult discharge planning    Contact Information    Permission Granted to Share Information With family/designee;facility             Functional Status       01/12/17 1028    Functional Status Current    Ambulation 0-->independent    Transferring 0-->independent    Toileting 0-->independent    Bathing 0-->independent    Dressing 0-->independent    Eating 0-->independent    Functional Status Prior    Ambulation 0-->independent    Transferring 0-->independent    Toileting 0-->independent    Bathing 0-->independent    Dressing 0-->independent     Eating 0-->independent            Psychosocial     None            Abuse/Neglect     None            Legal     None            Substance Abuse     None            Patient Forms     None          Marielle Lyle

## 2017-01-12 NOTE — H&P
Name: Semaj Narvaez ADMIT: 2017   : 1960  PCP: Luis Persaud MD    MRN: [id number] LOS: 1 days   AGE/SEX: 56 y.o. male  ROOM: 4/     Chief Complaint   Patient presents with   • Cough   • Flu Symptoms       Subjective   Mr Narvaez is a 56yoM with hx of HTN  who presents with 3 weeks of cough and sinus congestion. He went to PCP and received antibiotics this week without improvement and went back to PCP office on day prior to admission. There he was short of air and had presyncopal episode. He did not lose conciousness. He reports no CP or palpitations. He did have vision changes when standing. He was weak, short of breath, and sweating there. He reports no fevers but has had cold sweats. Antibiotics did not improve symptoms.    Found to have AFib with normal rate in ER. He reports having occasional palpitations remotely but none recently. No CP. No orthopnea, pnd, or weight gain.    History of Present Illness    Past Medical History   Diagnosis Date   • Cellulitis and abscess 2013   • DDD (degenerative disc disease), lumbosacral 2012   • Encounter for eye exam      with dilation   • Erectile dysfunction    • GERD (gastroesophageal reflux disease) 2013   • Hydradenitis 2008     suppurativa   • Hyperlipidemia 2013   • Hypertension      benign essential   • Low back pain 2013   • Spider bite 2012     with pustular rash surrounding this bite heart deep abrasion     Past Surgical History   Procedure Laterality Date   • Hernia repair       Family History   Problem Relation Age of Onset   • Heart disease Mother    • Cancer Father    • Hypertension Other    • Lung disease Other      Social History   Substance Use Topics   • Smoking status: Never Smoker   • Smokeless tobacco: None   • Alcohol use No     Prescriptions Prior to Admission   Medication Sig Dispense Refill Last Dose   • fluticasone (FLONASE) 50 MCG/ACT nasal spray 2 sprays into each nostril Daily for 30  days. 1 each 5 Taking   • metoprolol succinate XL (TOPROL-XL) 50 MG 24 hr tablet Take 1 tablet by mouth Daily. 30 tablet 5 Taking   • omeprazole OTC (PriLOSEC OTC) 20 MG EC tablet Take 20 mg by mouth As Needed.   Taking   • tadalafil (CIALIS) 20 MG tablet Take 20 mg by mouth As Needed. For 30 days    Taking     Allergies:  Lisinopril    Review of Systems   Constitutional: Positive for chills. Negative for fever.   HENT: Positive for trouble swallowing. Negative for sore throat.    Eyes: Negative for pain and redness.   Respiratory: Positive for cough and shortness of breath.    Cardiovascular: Negative for chest pain and palpitations.   Gastrointestinal: Positive for nausea. Negative for constipation, diarrhea and vomiting.   Endocrine: Negative for cold intolerance and heat intolerance.   Genitourinary: Negative for difficulty urinating and dysuria.   Musculoskeletal: Positive for myalgias. Negative for gait problem.   Skin: Negative for pallor and rash.   Allergic/Immunologic: Negative for environmental allergies and food allergies.   Neurological: Positive for light-headedness. Negative for headaches.   Hematological: Negative for adenopathy.   Psychiatric/Behavioral: Negative for agitation and confusion.        Objective    Vital Signs  Temp:  [96.9 °F (36.1 °C)-99.1 °F (37.3 °C)] 98.6 °F (37 °C)  Heart Rate:  [] 100  Resp:  [16-22] 22  BP: (118-145)/(80-97) 145/95  SpO2:  [91 %-95 %] 91 %  on  Flow (L/min):  [2] 2;   O2 Device: nasal cannula  Body mass index is 49.41 kg/(m^2).    Physical Exam   Constitutional: He is oriented to person, place, and time. He appears well-developed and well-nourished. No distress.   HENT:   Head: Normocephalic and atraumatic.   Nose: Nose normal.   Eyes: Conjunctivae and EOM are normal. Pupils are equal, round, and reactive to light.   Neck: Normal range of motion. Neck supple.   Cardiovascular: Normal rate and intact distal pulses.  An irregularly irregular rhythm present.    No murmur heard.  Pulmonary/Chest: Effort normal. He has no wheezes. He has rales.   Abdominal: Soft. There is no tenderness.   Musculoskeletal: Normal range of motion. He exhibits no tenderness.   Neurological: He is alert and oriented to person, place, and time. No cranial nerve deficit.   Skin: Skin is warm and dry. He is not diaphoretic.   Psychiatric: He has a normal mood and affect. His behavior is normal.   Nursing note and vitals reviewed.      Results Review:   I reviewed the patient's new clinical results. I reviewed imaging and agree with interpretation. I reviewed EKG showing AFib with regular rate. I reviewed prior records.  Imaging Results (last 24 hours)     Procedure Component Value Units Date/Time    XR Chest 2 View [[id number]] Collected:  17     Updated:  17    Narrative:       PA AND LATERAL CHEST X-RAY     HISTORY: Shortness of breath, weakness, sweating and chest pain.     Chest x-ray consisting of PA and lateral views is provided. This is  compared with chest x-ray 2010.     FINDINGS: The cardiomediastinal silhouette is normal. The lungs are  clear. The costophrenic sulci are dry and the bones appear normal. There  is no pneumothorax.       Impression:       Negative.     This report was finalized on 2017 7:10 PM by Dr. Milton Dowell MD.       CT Chest Without Contrast [[id number]] Collected:  17     Updated:  17    Narrative:       CT SCAN OF THE CHEST WITHOUT CONTRAST     HISTORY: Cough and fever.     FINDINGS: The CT scan was performed as an emergency procedure through  the chest without contrast and demonstrates the followin. The lungs are well-expanded and there is some very minimal vague  interstitial and alveolar haziness at the left base posteriorly  consistent with an area of developing pneumonia. There is a calcified  granuloma at the right base and the lungs are otherwise clear.  2. There is no mediastinal or hilar or  axillary adenopathy.  3. There is no pericardial effusion. The CT images through the upper  liver, spleen, and both adrenal glands are unremarkable.     This report was finalized on 1/11/2017 10:06 PM by Dr. Chapito Naik MD.             Assessment/Plan     Principal Problem:    Pneumonia of left lung due to infectious organism  Active Problems:    Hyperlipidemia    Hypertension    Renal insufficiency    New onset a-fib      Assessment & Plan  -Procal elevated. PNA on CT. Will give Levaquin for coverage. Check RVP, urine antigens, and sputum culture. He had no wheezing on my exam so will not continue his steroid, he has no known history of COPD or asthma.  -New onset afib. BNP elevated as well. Troponin negative. Metoprolol for rate control and will start ASA and Atorvastatin. Check Echo and consult Cardiology.  -Check urinalysis for ELISEO. Will monitor renal function on low rate of IVF pending his echo. He had no signs of volume overload on chest imaging.  -Lovenox  -Full Code      I discussed the patients findings and my recommendations with patient.          Tiago Alexander MD  [facility]  01/12/17  2:37 AM

## 2017-01-12 NOTE — PAYOR COMM NOTE
"Semaj Najera (56 y.o. Male)                                                                                   REF#[id number]                                                                              CONTACT=  EVANS PA                                                                            FAX  [phone]                                                                            #   [phone]          Date of Birth Social Security Number Address Home Phone MRN    1960  [id number] [facility] [address] [phone] [id number]    Oriental orthodox Marital Status          Scientologist Single       Admission Date Admission Type Admitting Provider Attending Provider Department, Room/Bed    1/11/17 Emergency Benjamin, MD Naldo Villalobos, Dustin Hood MD [address], 464/1    Discharge Date Discharge Disposition Discharge Destination                      Attending Provider: Dustin Hitchcock MD     Allergies:  Lisinopril    Isolation:  Contact   Infection:  RSV (01/12/17)   Code Status:  FULL    Ht:  64.02\" (162.6 cm)   Wt:  288 lb (131 kg)    Admission Cmt:  None   Principal Problem:  Pneumonia of left lung due to infectious organism [J18.9]                 Active Insurance as of 1/11/2017     Primary Coverage     Payor Plan Insurance Group Employer/Plan Group    ANTHEM BLUE CROSS ANTHEM BLUE CROSS BLUE SHIELD PPO [id number]     Payor Plan Address Payor Plan Phone Number Effective From Effective To    [address] [phone] 1/1/2016     [place] [address]       Subscriber Name Subscriber Birth Date Member ID       SEMAJ NAJERA 1960 [id number]                 Emergency Contacts      (Rel.) Home Phone Work Phone Mobile Phone    BrunoYemi murcia (Roommate) [phone] -- --            Insurance Information                ANTHEM BLUE CROSS/ANTHEM BLUE CROSS BLUE SHIELD PPO Phone: [phone]    Subscriber: Semaj Najera Subscriber#: [id number]    Group#: " [id number] Precert#:              History & Physical      Tiago Alexander MD at 2017  2:37 AM              Name: Semaj Narveaz ADMIT: 2017   : 1960  PCP: Luis Persaud MD    MRN: [id number] LOS: 1 days   AGE/SEX: 56 y.o. male  ROOM: [facility]     Chief Complaint   Patient presents with   • Cough   • Flu Symptoms       Subjective   Mr Narvaez is a 56yoM with hx of HTN  who presents with 3 weeks of cough and sinus congestion. He went to PCP and received antibiotics this week without improvement and went back to PCP office on day prior to admission. There he was short of air and had presyncopal episode. He did not lose conciousness. He reports no CP or palpitations. He did have vision changes when standing. He was weak, short of breath, and sweating there. He reports no fevers but has had cold sweats. Antibiotics did not improve symptoms.    Found to have AFib with normal rate in ER. He reports having occasional palpitations remotely but none recently. No CP. No orthopnea, pnd, or weight gain.    History of Present Illness    Past Medical History   Diagnosis Date   • Cellulitis and abscess 2013   • DDD (degenerative disc disease), lumbosacral 2012   • Encounter for eye exam      with dilation   • Erectile dysfunction    • GERD (gastroesophageal reflux disease) 2013   • Hydradenitis 2008     suppurativa   • Hyperlipidemia 2013   • Hypertension      benign essential   • Low back pain 2013   • Spider bite 2012     with pustular rash surrounding this bite heart deep abrasion     Past Surgical History   Procedure Laterality Date   • Hernia repair       Family History   Problem Relation Age of Onset   • Heart disease Mother    • Cancer Father    • Hypertension Other    • Lung disease Other      Social History   Substance Use Topics   • Smoking status: Never Smoker   • Smokeless tobacco: None   • Alcohol use No     Prescriptions Prior to Admission   Medication Sig  Dispense Refill Last Dose   • fluticasone (FLONASE) 50 MCG/ACT nasal spray 2 sprays into each nostril Daily for 30 days. 1 each 5 Taking   • metoprolol succinate XL (TOPROL-XL) 50 MG 24 hr tablet Take 1 tablet by mouth Daily. 30 tablet 5 Taking   • omeprazole OTC (PriLOSEC OTC) 20 MG EC tablet Take 20 mg by mouth As Needed.   Taking   • tadalafil (CIALIS) 20 MG tablet Take 20 mg by mouth As Needed. For 30 days    Taking     Allergies:  Lisinopril    Review of Systems   Constitutional: Positive for chills. Negative for fever.   HENT: Positive for trouble swallowing. Negative for sore throat.    Eyes: Negative for pain and redness.   Respiratory: Positive for cough and shortness of breath.    Cardiovascular: Negative for chest pain and palpitations.   Gastrointestinal: Positive for nausea. Negative for constipation, diarrhea and vomiting.   Endocrine: Negative for cold intolerance and heat intolerance.   Genitourinary: Negative for difficulty urinating and dysuria.   Musculoskeletal: Positive for myalgias. Negative for gait problem.   Skin: Negative for pallor and rash.   Allergic/Immunologic: Negative for environmental allergies and food allergies.   Neurological: Positive for light-headedness. Negative for headaches.   Hematological: Negative for adenopathy.   Psychiatric/Behavioral: Negative for agitation and confusion.        Objective    Vital Signs  Temp:  [96.9 °F (36.1 °C)-99.1 °F (37.3 °C)] 98.6 °F (37 °C)  Heart Rate:  [] 100  Resp:  [16-22] 22  BP: (118-145)/(80-97) 145/95  SpO2:  [91 %-95 %] 91 %  on  Flow (L/min):  [2] 2;   O2 Device: nasal cannula  Body mass index is 49.41 kg/(m^2).    Physical Exam   Constitutional: He is oriented to person, place, and time. He appears well-developed and well-nourished. No distress.   HENT:   Head: Normocephalic and atraumatic.   Nose: Nose normal.   Eyes: Conjunctivae and EOM are normal. Pupils are equal, round, and reactive to light.   Neck: Normal range of  motion. Neck supple.   Cardiovascular: Normal rate and intact distal pulses.  An irregularly irregular rhythm present.   No murmur heard.  Pulmonary/Chest: Effort normal. He has no wheezes. He has rales.   Abdominal: Soft. There is no tenderness.   Musculoskeletal: Normal range of motion. He exhibits no tenderness.   Neurological: He is alert and oriented to person, place, and time. No cranial nerve deficit.   Skin: Skin is warm and dry. He is not diaphoretic.   Psychiatric: He has a normal mood and affect. His behavior is normal.   Nursing note and vitals reviewed.      Results Review:   I reviewed the patient's new clinical results. I reviewed imaging and agree with interpretation. I reviewed EKG showing AFib with regular rate. I reviewed prior records.  Imaging Results (last 24 hours)     Procedure Component Value Units Date/Time    XR Chest 2 View [[id number]] Collected:  17     Updated:  17    Narrative:       PA AND LATERAL CHEST X-RAY     HISTORY: Shortness of breath, weakness, sweating and chest pain.     Chest x-ray consisting of PA and lateral views is provided. This is  compared with chest x-ray 2010.     FINDINGS: The cardiomediastinal silhouette is normal. The lungs are  clear. The costophrenic sulci are dry and the bones appear normal. There  is no pneumothorax.       Impression:       Negative.     This report was finalized on 2017 7:10 PM by Dr. Milton Dowell MD.       CT Chest Without Contrast [[id number]] Collected:  17     Updated:  17    Narrative:       CT SCAN OF THE CHEST WITHOUT CONTRAST     HISTORY: Cough and fever.     FINDINGS: The CT scan was performed as an emergency procedure through  the chest without contrast and demonstrates the followin. The lungs are well-expanded and there is some very minimal vague  interstitial and alveolar haziness at the left base posteriorly  consistent with an area of developing pneumonia. There is a  calcified  granuloma at the right base and the lungs are otherwise clear.  2. There is no mediastinal or hilar or axillary adenopathy.  3. There is no pericardial effusion. The CT images through the upper  liver, spleen, and both adrenal glands are unremarkable.     This report was finalized on 1/11/2017 10:06 PM by Dr. Chapito Naik MD.             Assessment/Plan     Principal Problem:    Pneumonia of left lung due to infectious organism  Active Problems:    Hyperlipidemia    Hypertension    Renal insufficiency    New onset a-fib      Assessment & Plan  -Procal elevated. PNA on CT. Will give Levaquin for coverage. Check RVP, urine antigens, and sputum culture. He had no wheezing on my exam so will not continue his steroid, he has no known history of COPD or asthma.  -New onset afib. BNP elevated as well. Troponin negative. Metoprolol for rate control and will start ASA and Atorvastatin. Check Echo and consult Cardiology.  -Check urinalysis for ELISEO. Will monitor renal function on low rate of IVF pending his echo. He had no signs of volume overload on chest imaging.  -Lovenox  -Full Code      I discussed the patients findings and my recommendations with patient.          Tiago Alexander MD  [facility]  01/12/17  2:37 AM       Electronically signed by Tiago Alexander MD at 1/12/2017  2:55 AM           Emergency Department Notes      Sarah Gomez RN at 1/11/2017  5:50 PM          Patient is sent by PCP for possible pneumonia. States that he has had a cough and URI-like symptoms for the last few weeks. States that he has been on antibiotics but that it is not helping any. Patient states that the cough is nonproductive. He denies fevers but complains of cold sweats.      Sarah Gomez RN  01/11/17 7651       Electronically signed by Sarah Gomez RN at 1/11/2017  5:51 PM      Javad Coelho MD at 1/11/2017  8:50 PM          EMERGENCY DEPARTMENT ENCOUNTER       CHIEF  "COMPLAINT  Chief Complaint: Cough  History given by: Patient  History limited by: N/A  Room Number: 16/16  PMD: Luis Persaud MD      HPI:  HPI Comments: Pt c/o productive cough onset several weeks ago. Pt has also had decreased appetite, nausea, generalized weakness, and chest congestion, but denies CP. Pt saw his PMD for this several weeks ago and was started on an abx, the course of which pt finished with no sx relief. Pt again saw his PMD for this today and was referred to the ER for further evaluation as pt had a near-syncopal episode in his PMD's office (pt was diaphoretic, lightheaded, and nauseated - pt states that his near-syncopal episode has resolved). Pt denies prior h/o A-fib, but reports that he has had intermittent episodes of palpitations \"for several years\". There are no other complaints at this time.     Patient is a 56 y.o. male presenting with cough.   Cough   Cough characteristics:  Productive  Sputum characteristics:  Nondescript  Severity:  Moderate  Onset quality:  Gradual  Duration: onset several weeks ago.  Timing:  Intermittent  Progression:  Unchanged  Context: upper respiratory infection    Relieved by:  Nothing  Worsened by:  Nothing  Ineffective treatments: antibiotics.  Associated symptoms: diaphoresis (during near-syncopal episode - now resolved) and wheezing    Associated symptoms: no chest pain, no chills, no myalgias, no rash, no rhinorrhea and no sore throat          PAST MEDICAL HISTORY  Active Ambulatory Problems     Diagnosis Date Noted   • Cellulitis and abscess 06/13/2013   • GERD (gastroesophageal reflux disease) 05/31/2013   • Hyperlipidemia 05/31/2013   • Hypertension    • DDD (degenerative disc disease), lumbosacral 07/06/2012   • Low back pain 05/31/2013   • Spider bite 06/26/2012     Resolved Ambulatory Problems     Diagnosis Date Noted   • No Resolved Ambulatory Problems     Past Medical History   Diagnosis Date   • Encounter for eye exam 2011   • Erectile " "dysfunction    • Hydradenitis 12/22/2008         PAST SURGICAL HISTORY  Past Surgical History   Procedure Laterality Date   • Hernia repair           FAMILY HISTORY  Family History   Problem Relation Age of Onset   • Heart disease Mother    • Cancer Father    • Hypertension Other    • Lung disease Other          SOCIAL HISTORY  Social History     Social History   • Marital status: Single     Spouse name: N/A   • Number of children: N/A   • Years of education: N/A     Occupational History   • Not on file.     Social History Main Topics   • Smoking status: Never Smoker   • Smokeless tobacco: Not on file   • Alcohol use No   • Drug use: No   • Sexual activity: Not Currently     Other Topics Concern   • Not on file     Social History Narrative         ALLERGIES  Lisinopril        REVIEW OF SYSTEMS  Review of Systems   Constitutional: Positive for appetite change (decreased appetite) and diaphoresis (during near-syncopal episode - now resolved). Negative for chills and fatigue.   HENT: Positive for congestion (chest). Negative for rhinorrhea and sore throat.    Eyes: Negative for pain.   Respiratory: Positive for cough and wheezing.    Cardiovascular: Positive for palpitations (\"for several years\"). Negative for chest pain and leg swelling.   Gastrointestinal: Positive for nausea. Negative for abdominal pain, diarrhea and vomiting.   Genitourinary: Negative for difficulty urinating, dysuria, flank pain and frequency.   Musculoskeletal: Negative for arthralgias, myalgias, neck pain and neck stiffness.   Skin: Negative for rash.   Neurological: Positive for dizziness (lightheadedness - during near-syncopal episode - now resolved), weakness (generalized; not focal) and light-headedness (during near-syncopal episode - now resolved). Negative for speech difficulty and numbness.   Psychiatric/Behavioral: Negative.    All other systems reviewed and are negative.           PHYSICAL EXAM  ED Triage Vitals   Temp Heart Rate Resp " BP SpO2   01/11/17 1747 01/11/17 1747 01/11/17 1747 01/11/17 1751 01/11/17 1747   97 °F (36.1 °C) 83 20 132/97 95 % WNL      Temp src Heart Rate Source Patient Position BP Location FiO2 (%)   01/11/17 1747 01/11/17 1747 01/11/17 1751 01/11/17 1751 --   Tympanic Monitor Sitting Right arm        Physical Exam   Constitutional: He is oriented to person, place, and time. He appears distressed (mild).   HENT:   Head: Normocephalic.   Mouth/Throat: Mucous membranes are dry.   Eyes: EOM are normal. Pupils are equal, round, and reactive to light.   Neck: Normal range of motion. Neck supple. No JVD present.   Cardiovascular: Normal rate and normal heart sounds.  An irregularly irregular rhythm present.   Pulmonary/Chest: Effort normal. No respiratory distress. He has no decreased breath sounds. He has wheezes (bilaterally). He has no rhonchi. He has no rales.   Abdominal: Soft. There is no tenderness. There is no rebound and no guarding.   Musculoskeletal: Normal range of motion. He exhibits no edema (no BLE edema).   Neurological: He is alert and oriented to person, place, and time. He has normal sensation and normal strength.   Skin: Skin is warm and dry. There is pallor.   Psychiatric: Mood and affect normal.   Nursing note and vitals reviewed.          LAB RESULTS  Recent Results (from the past 24 hour(s))   Comprehensive Metabolic Panel    Collection Time: 01/11/17  5:56 PM   Result Value Ref Range    Glucose 128 (H) 65 - 99 mg/dL    BUN 22 (H) 6 - 20 mg/dL    Creatinine 1.78 (H) 0.76 - 1.27 mg/dL    Sodium 143 136 - 145 mmol/L    Potassium 4.4 3.5 - 5.2 mmol/L    Chloride 101 98 - 107 mmol/L    CO2 28.9 22.0 - 29.0 mmol/L    Calcium 9.5 8.6 - 10.5 mg/dL    Total Protein 7.4 6.0 - 8.5 g/dL    Albumin 4.50 3.50 - 5.20 g/dL    ALT (SGPT) 32 1 - 41 U/L    AST (SGOT) 29 1 - 40 U/L    Alkaline Phosphatase 83 39 - 117 U/L    Total Bilirubin 0.5 0.1 - 1.2 mg/dL    eGFR Non African Amer 39 (L) >60 mL/min/1.73    Globulin 2.9  gm/dL    A/G Ratio 1.6 g/dL    BUN/Creatinine Ratio 12.4 7.0 - 25.0    Anion Gap 13.1 mmol/L   BNP    Collection Time: 01/11/17  5:56 PM   Result Value Ref Range    proBNP 1118.0 (H) 5.0 - 900.0 pg/mL   Troponin    Collection Time: 01/11/17  5:56 PM   Result Value Ref Range    Troponin T <0.010 0.000 - 0.030 ng/mL   Light Blue Top    Collection Time: 01/11/17  5:56 PM   Result Value Ref Range    Extra Tube hold for add-on    Gold Top - SST    Collection Time: 01/11/17  5:56 PM   Result Value Ref Range    Extra Tube Hold for add-ons.    CBC Auto Differential    Collection Time: 01/11/17  5:56 PM   Result Value Ref Range    WBC 8.28 4.50 - 10.70 10*3/mm3    RBC 5.27 4.60 - 6.00 10*6/mm3    Hemoglobin 17.1 13.7 - 17.6 g/dL    Hematocrit 51.1 40.4 - 52.2 %    MCV 97.0 (H) 79.8 - 96.2 fL    MCH 32.4 27.0 - 32.7 pg    MCHC 33.5 32.6 - 36.4 g/dL    RDW 13.1 11.5 - 14.5 %    RDW-SD 46.5 37.0 - 54.0 fl    MPV 11.0 6.0 - 12.0 fL    Platelets 311 140 - 500 10*3/mm3    Neutrophil % 69.8 42.7 - 76.0 %    Lymphocyte % 15.0 (L) 19.6 - 45.3 %    Monocyte % 13.9 (H) 5.0 - 12.0 %    Eosinophil % 0.7 0.3 - 6.2 %    Basophil % 0.2 0.0 - 1.5 %    Immature Grans % 0.4 0.0 - 0.5 %    Neutrophils, Absolute 5.78 1.90 - 8.10 10*3/mm3    Lymphocytes, Absolute 1.24 0.90 - 4.80 10*3/mm3    Monocytes, Absolute 1.15 0.20 - 1.20 10*3/mm3    Eosinophils, Absolute 0.06 0.00 - 0.70 10*3/mm3    Basophils, Absolute 0.02 0.00 - 0.20 10*3/mm3    Immature Grans, Absolute 0.03 0.00 - 0.03 10*3/mm3   Protime-INR    Collection Time: 01/11/17  9:21 PM   Result Value Ref Range    Protime 12.5 11.7 - 14.2 Seconds    INR 0.97 0.90 - 1.10   Lactic Acid, Plasma    Collection Time: 01/11/17  9:21 PM   Result Value Ref Range    Lactate 1.1 0.5 - 2.0 mmol/L   Procalcitonin    Collection Time: 01/11/17  9:21 PM   Result Value Ref Range    Procalcitonin 0.14 0.10 - 0.25 ng/mL   Respiratory Panel, PCR    Collection Time: 01/11/17  9:22 PM   Result Value Ref Range     ADENOVIRUS, PCR Not Detected Not Detected    Coronavirus 229E Not Detected Not Detected    Coronavirus HKU1 Not Detected Not Detected    Coronavirus NL63 Not Detected Not Detected    Coronavirus OC43 Detected (A) Not Detected    Human Metapneumovirus Not Detected Not Detected    Human Rhinovirus/Enterovirus Not Detected Not Detected    Influenza B PCR Not Detected Not Detected    Parainfluenza Virus 1 Not Detected Not Detected    Parainfluenza Virus 2 Not Detected Not Detected    Parainfluenza Virus 3 Not Detected Not Detected    Parainfluenza Virus 4 Not Detected Not Detected    Bordetella pertussis pcr Not Detected Not Detected    Influenza 2009 H1N1 by PCR Not Detected Not Detected    Chlamydophila pneumoniae PCR Not Detected Not Detected    Mycoplasma pneumo by PCR Not Detected Not Detected    Influenza A PCR Not Detected Not Detected    Influenza A H3 Not Detected Not Detected    Influenza A H1 Not Detected Not Detected    RSV, PCR Detected (A) Not Detected   Troponin    Collection Time: 01/12/17 12:50 AM   Result Value Ref Range    Troponin T <0.010 0.000 - 0.030 ng/mL       Ordered the above labs and reviewed the results.        RADIOLOGY  XR Chest 2 View   Final Result   Negative.       This report was finalized on 1/11/2017 7:10 PM by Dr. Milton Dowell MD. Interpreted by radiologist. Independently viewed by me.             CT Chest Without Contrast - Mild left lower lobe infiltrate. Interpreted by radiologist. Discussed with radiologist (Dr. Naik). Independently viewed by me.             Ordered the above noted radiological studies. Reviewed by me in PACS.       PROCEDURES  Procedures    EKG:    EKG time: 20:18  Rhythm/Rate: A-Fib rate 91  No Acute Ischemia  Non-Specific ST-T changes    changed compared to prior on 2015 (pt was in NSR rate at that time; A-fib is new)     Interpreted Contemporaneously by me.  Independently viewed by me          PROGRESS AND CONSULTS  ED Course     9:00 PM: CT Chest,  respiratory viral panel, blood cultures, lactic acid, and blood work ordered for further evaluation. IV fluids ordered to hydrate pt. Duo-neb and solumedrol ordered to improve breathing and decrease inflammation.     9:19 PM: Pt's CT Chest suggests that pt has a left lower lobe pneumonia. Ordered Levaquin for treatment. As pt is in new onset A-Fib, ordered Lovenox. Placed call to A for admission. Decision time to admit: now.     9:34 PM: Rechecked pt. Pt is resting. Informed pt that he is in new onset A-Fib for which I have ordered Lovenox. Pt's CT Chest suggests that pt has a left lower lobe pneumonia. Need for admission for further evaluation and IV abx. Pt agrees with plan.     9:42 PM: Discussed case with Dr. Alexander, hospitalist. He will admit pt to a telemetry bed.             MEDICAL DECISION MAKING      MDM  Number of Diagnoses or Management Options  Atrial fibrillation, unspecified type - new onset:   Near syncope: established and improving  Pneumonia of left lower lobe due to infectious organism:   Renal insufficiency:      Amount and/or Complexity of Data Reviewed  Clinical lab tests: ordered and reviewed (Creatinine is 1.79. Troponin is negative. WBC is 8.28.)  Tests in the radiology section of CPT®:  ordered and reviewed (CXR is negative acute. )  Tests in the medicine section of CPT®:  ordered and reviewed (EKG interpreted.   )  Discussion of test results with the performing providers: yes (CT Chest results d/w radiologist.   )  Decide to obtain previous medical records or to obtain history from someone other than the patient: yes  Review and summarize past medical records: yes (Pt's creatinine was 1.15 about 1 month ago. )  Discuss the patient with other providers: yes (Case d/w Dr. Alexander, hospitalist, who will admit pt to a telemetry bed.   )    Patient Progress  Patient progress: stable             DIAGNOSIS  Final diagnoses:   Renal insufficiency   Atrial fibrillation, unspecified type - new  onset   Near syncope   Pneumonia of left lower lobe due to infectious organism         DISPOSITION  Pt admitted to telemetry.    ADMISSION    Discussed treatment plan and reason for admission with pt/family and admitting physician.  Pt/family voiced understanding of the plan for admission for further testing/treatment as needed.         Latest Documented Vital Signs:  As of 9:42 PM  BP- 131/86 HR- (P) 70 Temp- 96.9 °F (36.1 °C) (Tympanic) O2 sat- (P) 93%        --  Documentation assistance provided by tiana Escobar for Dr. Laquita MD.  Information recorded by the scribe was done at my direction and has been verified and validated by me.            Ramírez Escobar  01/11/17 4716       Javad Coelho MD  01/12/17 0217       Electronically signed by Javad Coelho MD at 1/12/2017  2:17 AM      John Arroyo at 1/11/2017  9:12 PM          RT Paged     John Arroyo  01/11/17 2112       Electronically signed by John Arroyo at 1/11/2017  9:12 PM      Cammy Spence RN at 1/11/2017 10:31 PM          Felisa dougherty RN unavailable at this time, will try again.      Cammy Spence RN  01/11/17 2231       Electronically signed by Cammy Spence RN at 1/11/2017 10:31 PM        Vital Signs (last 24 hours)       01/11 0700  -  01/12 0659 01/12 0700  -  01/12 1040   Most Recent    Temp (°F) 96.9 -  99.1      98.1     98.1 (36.7)    Heart Rate 69 -  100    71 -  82     82    Resp 16 -  22      20     20    /74 -  145/95      147/91     147/91    SpO2 (%) 91 -  95      94     94          Hospital Medications (active)       Dose Frequency Start End    acetaminophen (TYLENOL) tablet 650 mg 650 mg Every 4 Hours PRN 1/12/2017     Sig - Route: Take 2 tablets by mouth Every 4 (Four) Hours As Needed for mild pain (1-3). - Oral    albuterol (PROVENTIL) nebulizer solution 2.5 mg 2.5 mg Every 4 Hours PRN 1/12/2017     Sig - Route: Take 2.5 mg by nebulization Every 4 (Four) Hours As Needed for wheezing or  shortness of air. - Nebulization    aspirin EC tablet 81 mg 81 mg Daily 1/12/2017     Sig - Route: Take 1 tablet by mouth Daily. - Oral    atorvastatin (LIPITOR) tablet 40 mg 40 mg Nightly 1/12/2017     Sig - Route: Take 1 tablet by mouth Every Night. - Oral    enoxaparin (LOVENOX) syringe 130 mg 1 mg/kg × 128 kg Once 1/11/2017 1/11/2017    Sig - Route: Inject 1.3 mL under the skin 1 (One) Time. - Subcutaneous    enoxaparin (LOVENOX) syringe 40 mg 40 mg Every 24 Hours 1/12/2017     Sig - Route: Inject 0.4 mL under the skin Daily. - Subcutaneous    fluticasone (FLONASE) 50 MCG/ACT nasal spray 2 spray 2 spray Daily 1/12/2017 1/29/2017    Sig - Route: 2 sprays into each nostril Daily. - Nasal    HYDROcodone-acetaminophen (NORCO) 5-325 MG per tablet 1 tablet 1 tablet Every 4 Hours PRN 1/12/2017 1/22/2017    Sig - Route: Take 1 tablet by mouth Every 4 (Four) Hours As Needed for moderate pain (4-6). - Oral    ipratropium-albuterol (DUO-NEB) nebulizer solution 3 mL 3 mL Once 1/11/2017 1/11/2017    Sig - Route: Take 3 mL by nebulization 1 (One) Time. - Nebulization    levoFLOXacin (LEVAQUIN) 750 mg/150 mL D5W (premix) (LEVAQUIN) 750 mg 750 mg Once 1/11/2017 1/11/2017    Sig - Route: Infuse 150 mL into a venous catheter 1 (One) Time. - Intravenous    levoFLOXacin (LEVAQUIN) 750 mg/150 mL D5W (premix) (LEVAQUIN) 750 mg 750 mg Every 24 Hours 1/12/2017     Sig - Route: Infuse 150 mL into a venous catheter Daily. - Intravenous    methylPREDNISolone sodium succinate (SOLU-Medrol) injection 125 mg 125 mg Once 1/11/2017 1/11/2017    Sig - Route: Infuse 2 mL into a venous catheter 1 (One) Time. - Intravenous    metoprolol tartrate (LOPRESSOR) tablet 25 mg 25 mg Every 12 Hours Scheduled 1/12/2017     Sig - Route: Take 1 tablet by mouth Every 12 (Twelve) Hours. - Oral    nitroglycerin (NITROSTAT) SL tablet 0.4 mg 0.4 mg Every 5 Minutes PRN 1/12/2017     Sig - Route: Place 1 tablet under the tongue Every 5 (Five) Minutes As Needed for  chest pain (if systolic BP greater than 100 mm/Hg.). - Sublingual    pantoprazole (PROTONIX) EC tablet 40 mg 40 mg Every Early Morning 1/12/2017     Sig - Route: Take 1 tablet by mouth Every Morning. - Oral    sodium chloride 0.9 % bolus 1,000 mL 1,000 mL Once 1/11/2017 1/11/2017    Sig - Route: Infuse 1,000 mL into a venous catheter 1 (One) Time. - Intravenous    sodium chloride 0.9 % flush 1-10 mL 1-10 mL As Needed 1/12/2017     Sig - Route: Infuse 1-10 mL into a venous catheter As Needed for line care. - Intravenous    sodium chloride 0.9 % flush 1-10 mL 1-10 mL As Needed 1/12/2017     Sig - Route: Infuse 1-10 mL into a venous catheter As Needed for line care. - Intravenous    sodium chloride 0.9 % flush 10 mL 10 mL As Needed 1/11/2017     Sig - Route: Infuse 10 mL into a venous catheter As Needed for line care. - Intravenous    Cosign for Ordering: Accepted by Javad Coelho MD on 1/11/2017  9:10 PM    sodium chloride 0.9 % infusion 75 mL/hr Continuous 1/12/2017     Sig - Route: Infuse 75 mL/hr into a venous catheter Continuous. - Intravenous    sodium chloride 0.9 % infusion (Discontinued) 125 mL/hr Continuous 1/11/2017 1/12/2017    Sig - Route: Infuse 125 mL/hr into a venous catheter Continuous. - Intravenous

## 2017-01-12 NOTE — CONSULTS
[facility]  Consult Note                                                                              1/12/2017  Tiago Alexander MD    Patient Identification:  Semaj Narvaez:   56 y.o.  male  1960     Date of Admission:1/11/2017    CC: Consulted for atrial fibrillation    History of Present Illness: Mr. Narvaez is a 56 year old gentleman with no documented history of coronary artery disease, congestive heart failure, arrhthymias, or lung disease/ sleep apnea. He does carry a history of hypertension, hyperlipidemia, and GERD. Patient was seen his PCP yesterday for follow up from URI and sinusitis and has completed his Augmentin. He reported to his PCP that he feels worse and still has a cough and is dizzy. Vitals at this appointment were 118/80 and HR 69. Patient was c/o decreased appetite, nausea, chest congestion and generalized weakness. In the office he was lightheaded and diaphoretic and had a near syncopal event. He was sent to the ED for further evaluation.    Upon presentation to the ED patient's BP was 132/97, HR 83 (AF), afebrile and SPO2 95 % RA. His troponins are negative ×3, proBNP 1118, BUS/creatinine 22/1.78, and respiratory panel showed positive for Corona virus and RSV. CT of the chest showed mild left lower lobe infiltrate. Patient was found to be in A. Fib with a controlled rate and he takes Toprol at home for his blood pressure. Patient denies any chest pain but he does report having occasional palpitations, so onset of atrial fibrillation cannot be determined. TSH is normal.  Is also noted to be bradycardic this afternoon with a 3.1 second pause.  Metoprolol succinate has been changed to metoprolol titrate 25 mg by mouth twice a day since admission and pauses have occurred on metoprolol titrate.    Of note, on further questioning patient states he has not here for his heart and he knows he's had atrial fibrillation for years.  He states he has been told that this intermittently as  it has shown up on prior EKG tests that has been intermittent.  He has not been on any other medication or anticoagulants in the past.  He states he often gets a fluttering sensation that lasts a few seconds several times a week.  He also describes having chest pain though states this is chronic and unchanged with these events.  He is otherwise usually fairly active and walks extensively without any exertional chest pain or shortness of breath up until his recent illness.  At the end of our conversation patient still states he has not here for his heart.    Past Medical History:  Past Medical History   Diagnosis Date   • Cellulitis and abscess 06/13/2013   • DDD (degenerative disc disease), lumbosacral 07/06/2012   • Encounter for eye exam 2011     with dilation   • Erectile dysfunction    • GERD (gastroesophageal reflux disease) 05/31/2013   • Hydradenitis 12/22/2008     suppurativa   • Hyperlipidemia 05/31/2013   • Hypertension      benign essential   • Low back pain 05/31/2013   • Spider bite 06/26/2012     with pustular rash surrounding this bite heart deep abrasion       Past Surgical History:  Past Surgical History   Procedure Laterality Date   • Hernia repair         Allergies:  Allergies   Allergen Reactions   • Lisinopril        Home Meds:  Prescriptions Prior to Admission   Medication Sig Dispense Refill Last Dose   • fluticasone (FLONASE) 50 MCG/ACT nasal spray 2 sprays into each nostril Daily for 30 days. 1 each 5 Taking   • metoprolol succinate XL (TOPROL-XL) 50 MG 24 hr tablet Take 1 tablet by mouth Daily. 30 tablet 5 Taking   • omeprazole OTC (PriLOSEC OTC) 20 MG EC tablet Take 20 mg by mouth As Needed.   Taking   • tadalafil (CIALIS) 20 MG tablet Take 20 mg by mouth As Needed. For 30 days    Taking       Current Meds  Scheduled Meds:  aspirin 81 mg Oral Daily   atorvastatin 40 mg Oral Nightly   enoxaparin 40 mg Subcutaneous Q24H   fluticasone 2 spray Nasal Daily   levoFLOXacin 750 mg Intravenous Q24H  "  metoprolol tartrate 25 mg Oral Q12H   pantoprazole 40 mg Oral Q AM     Continuous Infusions:  sodium chloride 75 mL/hr Last Rate: 75 mL/hr (01/12/17 7895)     Social History:   Social History     Social History   • Marital status: Single     Spouse name: N/A   • Number of children: N/A   • Years of education: N/A     Occupational History   • Not on file.     Social History Main Topics   • Smoking status: Never Smoker   • Smokeless tobacco: Not on file   • Alcohol use No   • Drug use: No   • Sexual activity: Not Currently     Other Topics Concern   • Not on file     Social History Narrative       Family History:  Family History   Problem Relation Age of Onset   • Heart disease Mother    • Cancer Father    • Hypertension Other    • Lung disease Other        REVIEW OF SYSTEMS:   CONSTITUTIONAL: No weight loss,, weakness or fatigue.   HEENT: Eyes: No visual loss, blurred vision, double vision or yellow sclerae. Ears, Nose, Throat: No hearing loss, sneezing, congestion, runny nose or sore throat.   SKIN: No rash or itching.     RESPIRATORY: No hemoptysis or sputum.   GASTROINTESTINAL: No anorexia, nausea, vomiting or diarrhea. No abdominal pain, bright red blood per rectum or melena.  GENITOURINARY: No burning on urination, hematuria or increased frequency.  NEUROLOGICAL: No headache, dizziness, syncope, paralysis, ataxia, numbness or tingling in the extremities. No change in bowel or bladder control.   MUSCULOSKELETAL: No muscle, back pain, joint pain or stiffness.   HEMATOLOGIC: No anemia, bleeding or bruising.   LYMPHATICS: No enlarged nodes. No history of splenectomy.   PSYCHIATRIC: No history of depression, anxiety, hallucinations.   ENDOCRINOLOGIC: No reports of sweating, cold or heat intolerance. No polyuria or polydipsia.     Physical Exam    Visit Vitals   • /91 (BP Location: Right arm, Patient Position: Lying)   • Pulse 71   • Temp 98.1 °F (36.7 °C) (Oral)   • Resp 20   • Ht 64.02\" (162.6 cm)   • Wt 288 " lb (131 kg)   • SpO2 94%   • BMI 49.41 kg/m2       General Appearance Well developed, cooperative, obese and well nourished and no acute distress   Head Normocephalic, without abnormality, atraumatic   Ears Ears appear intact with no abnormalities noted   Throat No oral lesions, no thrush, oral mucosa moist   Neck No adenopathy, supple, trachea midline, no thyromegaly, no carotid bruit, no JVD   Back No skin lesions, erythema or scars, no tenderness to percussion or palptaion,range of motion is normal   Lungs Distant breath sounds with few rhonchi,respirations regular, even and unlabored   Heart Regular rhythm and normal rate, normal S1 and S2, no murmur, no gallop, no rub, no click   Chest wall No abnormalities observed   Abdomen Normal bowel sounds, no masses, no hepatomegaly,    Extremities Moves all extremities well, no edema, no cyanosis, no redness   Pulses Pulses palpable and equal bilaterally. Normal radial, carotid, femoral, dorsalis pedis and posterior tibial pulses bilaterally. Normal abdominal aorta   Skin No bleeding, bruising or rash   Psyhiatric Alert and oriented x 3, normal mood and affect      Results from last 7 days  Lab Units 01/12/17  0633 01/11/17  1756   SODIUM mmol/L 142 143   POTASSIUM mmol/L 4.3 4.4   CHLORIDE mmol/L 105 101   TOTAL CO2 mmol/L 23.1 28.9   BUN mg/dL 27* 22*   CREATININE mg/dL 1.26 1.78*   CALCIUM mg/dL 8.5* 9.5   BILIRUBIN mg/dL  --  0.5   ALK PHOS U/L  --  83   ALT (SGPT) U/L  --  32   AST (SGOT) U/L  --  29   GLUCOSE mg/dL 156* 128*       Results from last 7 days  Lab Units 01/12/17  0633 01/12/17  0050 01/11/17  1756   TROPONIN T ng/mL <0.010 <0.010 <0.010     )  Results from last 7 days  Lab Units 01/12/17  0633 01/11/17  1756   WBC 10*3/mm3 4.57 8.28   HEMOGLOBIN g/dL 14.9 17.1   HEMATOCRIT % 46.8 51.1   PLATELETS 10*3/mm3 273 311       Results from last 7 days  Lab Units 01/12/17  0633 01/11/17  2121   INR  1.08 0.97       Results from last 7 days  Lab Units  01/12/17  0633   MAGNESIUM mg/dL 2.1           I personally viewed and interpreted the patient's EKG/Telemetry data    Assessment and Plan  1.  Paroxysmal atrial fibrillation with early sick sinus syndrome and occasional pauses.  Suspect that sleep apnea is likely playing a role.  He was not interested in much treatment or assessment of this.  He is willing to consider an echocardiogram.  I discussed with him that it would be best to keep him on the current dose of metoprolol tartrate instead of the succinate which was at a slightly higher dose given his bradycardia arrhythmia.  Hopefully his atrial fibrillation will improve with treatment of underlying pneumonia.  His chads VASC score is 1 giving a 1.3% annual stroke risk (intermediate stroke risk).  Aspirin therapy or anticoagulant reasonable as long as there is no significant valvular heart disease and his ejection fraction remains near normal.  We'll make additional recommendations after the echocardiogram results available  2.  Cardiomegaly, as above  3.  RSV and coronavirus pneumonia  4.  Acute renal insufficiency, improved  5.  Hypertension, reasonable control  6.  Chest pain.  Has some nonspecific ST-T wave changes and multiple risk factors for coronary artery disease.  I recommended a stress perfusion study as an outpatient once he is recovered from pneumonia    Mini Louie  1/12/2017  8:35 AM    45min spent in reviewing records, discussion and examination of the patient and discussion with other members of the patient's medical team.     EMR Dragon/Transcription disclaimer:   Much of this encounter note is an electronic transcription/translation of spoken language to printed text. The electronic translation of spoken language may permit erroneous, or at times, nonsensical words or phrases to be inadvertently transcribed; Although I have reviewed the note for such errors, some may still exist.

## 2017-01-12 NOTE — PLAN OF CARE
Problem: Patient Care Overview (Adult)  Goal: Plan of Care Review  Outcome: Ongoing (interventions implemented as appropriate)    01/12/17 2926   Coping/Psychosocial Response Interventions   Plan Of Care Reviewed With patient   Patient Care Overview   Progress improving   Outcome Evaluation   Outcome Summary/Follow up Plan Pt really wants to go home, upset that cardiology was consulted but agreeable for the most part. On 2 liters of oxygen, states feels like his sinuses are clogging up, will ask for additional nose spray when dr harden rounds, will continue to monitor.       Goal: Adult Individualization and Mutuality  Outcome: Ongoing (interventions implemented as appropriate)  Goal: Discharge Needs Assessment  Outcome: Ongoing (interventions implemented as appropriate)    Problem: Respiratory Insufficiency (Adult)  Goal: Identify Related Risk Factors and Signs and Symptoms  Outcome: Ongoing (interventions implemented as appropriate)  Goal: Acid/Base Balance  Outcome: Ongoing (interventions implemented as appropriate)  Goal: Effective Ventilation  Outcome: Ongoing (interventions implemented as appropriate)    Problem: Tissue Perfusion, Ineffective Peripheral (Adult)  Goal: Identify Related Risk Factors and Signs and Symptoms  Outcome: Ongoing (interventions implemented as appropriate)  Goal: Adequate Tissue Perfusion  Outcome: Ongoing (interventions implemented as appropriate)

## 2017-01-12 NOTE — PLAN OF CARE
Problem: Patient Care Overview (Adult)  Goal: Plan of Care Review  Outcome: Ongoing (interventions implemented as appropriate)    01/12/17 0945   Coping/Psychosocial Response Interventions   Plan Of Care Reviewed With patient   Patient Care Overview   Progress improving   Outcome Evaluation   Outcome Summary/Follow up Plan Pt states that he feels much better at end of shift than when admitted. VSS. HR dips to 40's when patient is sleeping soundly. Pt. states this is normal for him. Cardiology consulted. No c/o pain.        Goal: Adult Individualization and Mutuality  Outcome: Ongoing (interventions implemented as appropriate)  Goal: Discharge Needs Assessment  Outcome: Ongoing (interventions implemented as appropriate)    Problem: Respiratory Insufficiency (Adult)  Goal: Identify Related Risk Factors and Signs and Symptoms  Outcome: Ongoing (interventions implemented as appropriate)  Goal: Acid/Base Balance  Outcome: Ongoing (interventions implemented as appropriate)  Goal: Effective Ventilation  Outcome: Ongoing (interventions implemented as appropriate)    Problem: Tissue Perfusion, Ineffective Peripheral (Adult)  Goal: Identify Related Risk Factors and Signs and Symptoms  Outcome: Ongoing (interventions implemented as appropriate)  Goal: Adequate Tissue Perfusion  Outcome: Ongoing (interventions implemented as appropriate)

## 2017-01-13 ENCOUNTER — APPOINTMENT (OUTPATIENT)
Dept: CARDIOLOGY | Facility: HOSPITAL | Age: 57
End: 2017-01-13
Attending: INTERNAL MEDICINE

## 2017-01-13 PROBLEM — I48.91 AFIB: Status: ACTIVE | Noted: 2017-01-13

## 2017-01-13 PROBLEM — N18.30 CKD (CHRONIC KIDNEY DISEASE) STAGE 3, GFR 30-59 ML/MIN: Status: ACTIVE | Noted: 2017-01-13

## 2017-01-13 PROBLEM — Z91.199 NONCOMPLIANCE: Status: ACTIVE | Noted: 2017-01-13

## 2017-01-13 PROBLEM — N28.9 RENAL INSUFFICIENCY: Status: RESOLVED | Noted: 2017-01-11 | Resolved: 2017-01-13

## 2017-01-13 PROBLEM — I48.91 NEW ONSET A-FIB: Status: RESOLVED | Noted: 2017-01-12 | Resolved: 2017-01-13

## 2017-01-13 LAB
ANION GAP SERPL CALCULATED.3IONS-SCNC: 12.2 MMOL/L
BASOPHILS # BLD AUTO: 0.02 10*3/MM3 (ref 0–0.2)
BASOPHILS NFR BLD AUTO: 0.2 % (ref 0–1.5)
BH CV ECHO MEAS - ACS: 2.2 CM
BH CV ECHO MEAS - AO MEAN PG (FULL): 1 MMHG
BH CV ECHO MEAS - AO MEAN PG: 3 MMHG
BH CV ECHO MEAS - AO ROOT AREA (BSA CORRECTED): 1.7
BH CV ECHO MEAS - AO ROOT AREA: 11.3 CM^2
BH CV ECHO MEAS - AO ROOT DIAM: 3.8 CM
BH CV ECHO MEAS - AO V2 MAX: 1.2 CM/SEC
BH CV ECHO MEAS - AO V2 MEAN: 87.6 CM/SEC
BH CV ECHO MEAS - AO V2 VTI: 25 CM
BH CV ECHO MEAS - ASC AORTA: 3.5 CM
BH CV ECHO MEAS - AVA(I,A): 4.3 CM^2
BH CV ECHO MEAS - AVA(I,D): 4.3 CM^2
BH CV ECHO MEAS - BSA(HAYCOCK): 2.5 M^2
BH CV ECHO MEAS - BSA: 2.3 M^2
BH CV ECHO MEAS - BZI_BMI: 49.4 KILOGRAMS/M^2
BH CV ECHO MEAS - BZI_METRIC_HEIGHT: 162.6 CM
BH CV ECHO MEAS - BZI_METRIC_WEIGHT: 130.6 KG
BH CV ECHO MEAS - CONTRAST EF (2CH): 66 ML/M^2
BH CV ECHO MEAS - CONTRAST EF 4CH: 66.5 ML/M^2
BH CV ECHO MEAS - EDV(CUBED): 148.9 ML
BH CV ECHO MEAS - EDV(MOD-SP2): 188 ML
BH CV ECHO MEAS - EDV(MOD-SP4): 176 ML
BH CV ECHO MEAS - EDV(TEICH): 135.3 ML
BH CV ECHO MEAS - EF(CUBED): 68.7 %
BH CV ECHO MEAS - EF(MOD-SP2): 66 %
BH CV ECHO MEAS - EF(MOD-SP4): 66.5 %
BH CV ECHO MEAS - EF(TEICH): 59.8 %
BH CV ECHO MEAS - ESV(CUBED): 46.7 ML
BH CV ECHO MEAS - ESV(MOD-SP2): 64 ML
BH CV ECHO MEAS - ESV(MOD-SP4): 59 ML
BH CV ECHO MEAS - ESV(TEICH): 54.4 ML
BH CV ECHO MEAS - FS: 32.1 %
BH CV ECHO MEAS - IVS/LVPW: 0.91
BH CV ECHO MEAS - IVSD: 1 CM
BH CV ECHO MEAS - LAT PEAK E' VEL: 6 CM/SEC
BH CV ECHO MEAS - LV DIASTOLIC VOL/BSA (35-75): 77.1 ML/M^2
BH CV ECHO MEAS - LV MASS(C)D: 213.9 GRAMS
BH CV ECHO MEAS - LV MASS(C)DI: 93.6 GRAMS/M^2
BH CV ECHO MEAS - LV MEAN PG: 2 MMHG
BH CV ECHO MEAS - LV SYSTOLIC VOL/BSA (12-30): 25.8 ML/M^2
BH CV ECHO MEAS - LV V1 MAX: 107 CM/SEC
BH CV ECHO MEAS - LV V1 MEAN: 73.1 CM/SEC
BH CV ECHO MEAS - LV V1 VTI: 21.7 CM
BH CV ECHO MEAS - LVIDD: 5.3 CM
BH CV ECHO MEAS - LVIDS: 3.6 CM
BH CV ECHO MEAS - LVLD AP2: 9 CM
BH CV ECHO MEAS - LVLD AP4: 9 CM
BH CV ECHO MEAS - LVLS AP2: 8 CM
BH CV ECHO MEAS - LVLS AP4: 7.7 CM
BH CV ECHO MEAS - LVOT AREA (M): 4.9 CM^2
BH CV ECHO MEAS - LVOT AREA: 4.9 CM^2
BH CV ECHO MEAS - LVOT DIAM: 2.5 CM
BH CV ECHO MEAS - LVPWD: 1.1 CM
BH CV ECHO MEAS - MED PEAK E' VEL: 8 CM/SEC
BH CV ECHO MEAS - MV A DUR: 127 SEC
BH CV ECHO MEAS - MV A MAX VEL: 33.4 CM/SEC
BH CV ECHO MEAS - MV DEC SLOPE: 494 CM/SEC^2
BH CV ECHO MEAS - MV DEC TIME: 157 SEC
BH CV ECHO MEAS - MV E MAX VEL: 113.5 CM/SEC
BH CV ECHO MEAS - MV E/A: 3.4
BH CV ECHO MEAS - MV MEAN PG: 2 MMHG
BH CV ECHO MEAS - MV P1/2T MAX VEL: 128 CM/SEC
BH CV ECHO MEAS - MV P1/2T: 75.9 MSEC
BH CV ECHO MEAS - MV V2 MEAN: 58.2 CM/SEC
BH CV ECHO MEAS - MV V2 VTI: 40.9 CM
BH CV ECHO MEAS - MVA P1/2T LCG: 1.7 CM^2
BH CV ECHO MEAS - MVA(P1/2T): 2.9 CM^2
BH CV ECHO MEAS - MVA(VTI): 2.6 CM^2
BH CV ECHO MEAS - PA ACC SLOPE: 25.7 CM/SEC^2
BH CV ECHO MEAS - PA ACC TIME: 0.11 SEC
BH CV ECHO MEAS - PA MAX PG: 2.9 MMHG
BH CV ECHO MEAS - PA PR(ACCEL): 31.3 MMHG
BH CV ECHO MEAS - PA V2 MAX: 85.5 CM/SEC
BH CV ECHO MEAS - PULM A REVS DUR: 92 SEC
BH CV ECHO MEAS - PULM A REVS VEL: 20.3 CM/SEC
BH CV ECHO MEAS - PULM DIAS VEL: 57.1 CM/SEC
BH CV ECHO MEAS - PULM S/D: 0.51
BH CV ECHO MEAS - PULM SYS VEL: 29.4 CM/SEC
BH CV ECHO MEAS - QP/QS: 0.42
BH CV ECHO MEAS - RV MEAN PG: 1 MMHG
BH CV ECHO MEAS - RV V1 MEAN: 40 CM/SEC
BH CV ECHO MEAS - RV V1 VTI: 11.8 CM
BH CV ECHO MEAS - RVOT AREA: 3.8 CM^2
BH CV ECHO MEAS - RVOT DIAM: 2.2 CM
BH CV ECHO MEAS - SI(AO): 124.1 ML/M^2
BH CV ECHO MEAS - SI(CUBED): 44.8 ML/M^2
BH CV ECHO MEAS - SI(LVOT): 46.6 ML/M^2
BH CV ECHO MEAS - SI(MOD-SP2): 54.3 ML/M^2
BH CV ECHO MEAS - SI(MOD-SP4): 51.2 ML/M^2
BH CV ECHO MEAS - SI(TEICH): 35.4 ML/M^2
BH CV ECHO MEAS - SV(AO): 283.5 ML
BH CV ECHO MEAS - SV(CUBED): 102.2 ML
BH CV ECHO MEAS - SV(LVOT): 106.5 ML
BH CV ECHO MEAS - SV(MOD-SP2): 124 ML
BH CV ECHO MEAS - SV(MOD-SP4): 117 ML
BH CV ECHO MEAS - SV(RVOT): 44.9 ML
BH CV ECHO MEAS - SV(TEICH): 80.9 ML
BH CV ECHO MEAS - TAPSE (>1.6): 2.6 CM2
BH CV XLRA - RV BASE: 4.5 CM
BH CV XLRA - TDI S': 13 CM/SEC
BUN BLD-MCNC: 30 MG/DL (ref 6–20)
BUN/CREAT SERPL: 26.8 (ref 7–25)
CALCIUM SPEC-SCNC: 8.5 MG/DL (ref 8.6–10.5)
CHLORIDE SERPL-SCNC: 107 MMOL/L (ref 98–107)
CO2 SERPL-SCNC: 23.8 MMOL/L (ref 22–29)
CREAT BLD-MCNC: 1.12 MG/DL (ref 0.76–1.27)
DEPRECATED RDW RBC AUTO: 48 FL (ref 37–54)
E/E' RATIO: 16.5
EOSINOPHIL # BLD AUTO: 0.03 10*3/MM3 (ref 0–0.7)
EOSINOPHIL NFR BLD AUTO: 0.4 % (ref 0.3–6.2)
ERYTHROCYTE [DISTWIDTH] IN BLOOD BY AUTOMATED COUNT: 13.3 % (ref 11.5–14.5)
GFR SERPL CREATININE-BSD FRML MDRD: 67 ML/MIN/1.73
GLUCOSE BLD-MCNC: 140 MG/DL (ref 65–99)
HCT VFR BLD AUTO: 42.1 % (ref 40.4–52.2)
HGB BLD-MCNC: 13.6 G/DL (ref 13.7–17.6)
IMM GRANULOCYTES # BLD: 0 10*3/MM3 (ref 0–0.03)
IMM GRANULOCYTES NFR BLD: 0 % (ref 0–0.5)
LEFT ATRIUM VOLUME INDEX: 30.3 ML/M2
LV EF 2D ECHO EST: 67 %
LYMPHOCYTES # BLD AUTO: 1.86 10*3/MM3 (ref 0.9–4.8)
LYMPHOCYTES NFR BLD AUTO: 22.9 % (ref 19.6–45.3)
MCH RBC QN AUTO: 32.1 PG (ref 27–32.7)
MCHC RBC AUTO-ENTMCNC: 32.3 G/DL (ref 32.6–36.4)
MCV RBC AUTO: 99.3 FL (ref 79.8–96.2)
MONOCYTES # BLD AUTO: 0.67 10*3/MM3 (ref 0.2–1.2)
MONOCYTES NFR BLD AUTO: 8.2 % (ref 5–12)
NEUTROPHILS # BLD AUTO: 5.55 10*3/MM3 (ref 1.9–8.1)
NEUTROPHILS NFR BLD AUTO: 68.3 % (ref 42.7–76)
PLATELET # BLD AUTO: 230 10*3/MM3 (ref 140–500)
PMV BLD AUTO: 10.7 FL (ref 6–12)
POTASSIUM BLD-SCNC: 4.1 MMOL/L (ref 3.5–5.2)
RBC # BLD AUTO: 4.24 10*6/MM3 (ref 4.6–6)
SODIUM BLD-SCNC: 143 MMOL/L (ref 136–145)
TROPONIN T SERPL-MCNC: <0.01 NG/ML (ref 0–0.03)
WBC NRBC COR # BLD: 8.13 10*3/MM3 (ref 4.5–10.7)

## 2017-01-13 PROCEDURE — 80048 BASIC METABOLIC PNL TOTAL CA: CPT | Performed by: INTERNAL MEDICINE

## 2017-01-13 PROCEDURE — 25010000002 PERFLUTREN (DEFINITY) 8.476 MG IN SODIUM CHLORIDE 10 ML INJECTION: Performed by: HOSPITALIST

## 2017-01-13 PROCEDURE — C8929 TTE W OR WO FOL WCON,DOPPLER: HCPCS

## 2017-01-13 PROCEDURE — 25010000002 LEVOFLOXACIN PER 250 MG: Performed by: INTERNAL MEDICINE

## 2017-01-13 PROCEDURE — 85025 COMPLETE CBC W/AUTO DIFF WBC: CPT | Performed by: INTERNAL MEDICINE

## 2017-01-13 PROCEDURE — 93306 TTE W/DOPPLER COMPLETE: CPT | Performed by: INTERNAL MEDICINE

## 2017-01-13 PROCEDURE — 84484 ASSAY OF TROPONIN QUANT: CPT | Performed by: INTERNAL MEDICINE

## 2017-01-13 PROCEDURE — 99232 SBSQ HOSP IP/OBS MODERATE 35: CPT | Performed by: INTERNAL MEDICINE

## 2017-01-13 PROCEDURE — 25010000002 ENOXAPARIN PER 10 MG: Performed by: INTERNAL MEDICINE

## 2017-01-13 RX ORDER — ASPIRIN 325 MG
325 TABLET, DELAYED RELEASE (ENTERIC COATED) ORAL DAILY
Status: DISCONTINUED | OUTPATIENT
Start: 2017-01-14 | End: 2017-01-14 | Stop reason: HOSPADM

## 2017-01-13 RX ADMIN — ASPIRIN 81 MG: 81 TABLET ORAL at 11:00

## 2017-01-13 RX ADMIN — PERFLUTREN 4 ML: 6.52 INJECTION, SUSPENSION INTRAVENOUS at 09:50

## 2017-01-13 RX ADMIN — ATORVASTATIN CALCIUM 40 MG: 40 TABLET, FILM COATED ORAL at 22:17

## 2017-01-13 RX ADMIN — ENOXAPARIN SODIUM 40 MG: 40 INJECTION SUBCUTANEOUS at 22:27

## 2017-01-13 RX ADMIN — LEVOFLOXACIN 750 MG: 750 INJECTION, SOLUTION INTRAVENOUS at 22:18

## 2017-01-13 RX ADMIN — FLUTICASONE PROPIONATE 2 SPRAY: 50 SPRAY, METERED NASAL at 11:00

## 2017-01-13 RX ADMIN — METOPROLOL TARTRATE 12.5 MG: 25 TABLET ORAL at 22:18

## 2017-01-13 NOTE — PROGRESS NOTES
[facility]  Progress note: 2017    Patient Identification:  Name:Semaj Narvaez  Age:56 y.o.  Sex: male  :  1960  MRN: [id number]           CC: Follow up for atrial fibrillation    Interval history: now having asymptomatic pauses up to 3.5 seconds in SR predominantly at night.. Patient is asymptomatic. On lopressor 25 mg bid.  He now states his chest pain is only with coughing and has had this for many years.     Vital Signs:   Temp:  [98 °F (36.7 °C)-98.3 °F (36.8 °C)] 98 °F (36.7 °C)  Heart Rate:  [63-86] 63  Resp:  [18] 18  BP: (137-156)/(72-91) 140/77    Intake/Output Summary (Last 24 hours) at 17 0740  Last data filed at 17 0644   Gross per 24 hour   Intake   1800 ml   Output    750 ml   Net   1050 ml       Physical Examination:    General Appearance No acute distress   Neck No adenopathy, supple, trachea midline, no thyromegaly, no carotid bruit, no JVD   Lungs Clear to auscultation,respirations regular, even and unlabored   Heart Regular rhythm and normal rate, normal S1 and S2, no murmur, no gallop, no rub, no click   Chest wall No abnormalities observed   Abdomen Normal bowel sounds, no masses, no hepatomegaly, soft   Extremities Moves all extremities well, no edema, no cyanosis, no redness   Neurological Alert and oriented x 3     Lab Review:  Personally reviewed the labs, radiology imaging and other cardiac procedures.   Results from last 7 days  Lab Units 17  0622  17  1756   SODIUM mmol/L 143  < > 143   POTASSIUM mmol/L 4.1  < > 4.4   CHLORIDE mmol/L 107  < > 101   TOTAL CO2 mmol/L 23.8  < > 28.9   BUN mg/dL 30*  < > 22*   CREATININE mg/dL 1.12  < > 1.78*   CALCIUM mg/dL 8.5*  < > 9.5   BILIRUBIN mg/dL  --   --  0.5   ALK PHOS U/L  --   --  83   ALT (SGPT) U/L  --   --  32   AST (SGOT) U/L  --   --  29   GLUCOSE mg/dL 140*  < > 128*   < > = values in this interval not displayed.    Results from last 7 days  Lab Units 17  0622 17  1215  01/12/17  0633   TROPONIN T ng/mL <0.010 <0.010 <0.010     )  Results from last 7 days  Lab Units 01/13/17  0622 01/12/17  0633 01/11/17  1756   WBC 10*3/mm3 8.13 4.57 8.28   HEMOGLOBIN g/dL 13.6* 14.9 17.1   HEMATOCRIT % 42.1 46.8 51.1   PLATELETS 10*3/mm3 230 273 311       Results from last 7 days  Lab Units 01/12/17  0633 01/11/17  2121   INR  1.08 0.97       Medication Review:   Meds reviewed  Scheduled Meds:  aspirin 81 mg Oral Daily   atorvastatin 40 mg Oral Nightly   enoxaparin 40 mg Subcutaneous Q24H   fluticasone 2 spray Nasal Daily   levoFLOXacin 750 mg Intravenous Q24H   metoprolol tartrate 25 mg Oral Q12H   pantoprazole 40 mg Oral Q AM     Continuous Infusions:  sodium chloride 75 mL/hr Last Rate: 75 mL/hr (01/12/17 1847)     PRN Meds:.•  acetaminophen  •  albuterol  •  HYDROcodone-acetaminophen  •  nitroglycerin  •  sodium chloride  •  sodium chloride  •  sodium chloride            I personally viewed and interpreted the patient's EKG/Telemetry data    Assessment and Plan  1.  Proximal atrial fibrillation with intermittent bradycardia and sinus pauses.  Suspect sleep apnea with hyper vagotonia playing a significant role in his symptoms.  He has asymptomatic pauses and bradycardia of less than 4 seconds in duration.  He does not meet criteria for pacer placement at this time.  Would observe on the lower dose metoprolol and reassess with a Holter after sleep apnea has been evaluated and treated.  His chads VASC score is 1 the 1.3% annual stroke risk.  Therefore would continue aspirin  2.  Cardiomegaly, echocardiogram performed but results not available images not available to review  3.  Pneumonia  4.  Acute renal insufficiency  5.  Hypertension  6.  Chest pain as above, consider outpatient stress testing after his pneumonic process has resolved as long as chest pain does not recur   7.  Probable sleep apnea.  He appears amenable to sleep study as an outpatient    Mini Louie  1/13/20177:40 AM  35min spent  in reviewing records, discussion and examination of the patient and discussion with other members of the patient's medical team.         EMR Dragon/Transcription disclaimer:   Much of this encounter note is an electronic transcription/translation of spoken language to printed text. The electronic translation of spoken language may permit erroneous, or at times, nonsensical words or phrases to be inadvertently transcribed; Although I have reviewed the note for such errors, some may still exist.

## 2017-01-13 NOTE — PROGRESS NOTES
"    DAILY PROGRESS NOTE  [facility]    Patient Identification:  Name: Semaj Narvaez  Age: 56 y.o.  Sex: male  :  1960  MRN: [id number]         Primary Care Physician: Luis Persaud MD    Subjective:  Interval History: immediate words upon entering the room is that he isn't happy here and he hasnt been seen by an MD in 3 days which is lie and kept going on/on about how unhappy he is with [facility]. Denies cptoday/n/v/d    Objective:no fm. D/w rn    Scheduled Meds:    aspirin 81 mg Oral Daily   atorvastatin 40 mg Oral Nightly   enoxaparin 40 mg Subcutaneous Q24H   fluticasone 2 spray Nasal Daily   levoFLOXacin 750 mg Intravenous Q24H   metoprolol tartrate 25 mg Oral Q12H   pantoprazole 40 mg Oral Q AM     Continuous Infusions:     Vital signs in last 24 hours:  Temp:  [97.4 °F (36.3 °C)-98.2 °F (36.8 °C)] 97.4 °F (36.3 °C)  Heart Rate:  [63-82] 82  Resp:  [18] 18  BP: (140-169)/() 169/137    Intake/Output:    Intake/Output Summary (Last 24 hours) at 17 1525  Last data filed at 17 1300   Gross per 24 hour   Intake   1240 ml   Output    725 ml   Net    515 ml       Exam:  Visit Vitals   • BP (!) 169/137 (BP Location: Right arm, Patient Position: Sitting)   • Pulse 82   • Temp 97.4 °F (36.3 °C) (Oral)   • Resp 18   • Ht 64\" (162.6 cm)   • Wt 288 lb (131 kg)   • SpO2 96%   • BMI 49.44 kg/m2       General Appearance:    Alert, cooperative, no distress, AAOx3, not toxic, less than pleasant in his words/actions                          Head:    Normocephalic, without obvious abnormality, atraumatic                         Throat:   Lips, tongue, gums normal; oral mucosa pink and moist                         Lungs:    Clear w/ diffuse rhonchi to auscultation bilaterally, respirations unlabored                          Heart:    Irregularly irregular rate and rhythm                  Abdomen:     Soft, non-tender, bowel sounds active, truncally obese                 Extremities:   No " cyanosis or edema                  Neurologic:   CNII-XII intact, moving all extremities, no focal deficits noted     Data Review:  Labs in chart were reviewed.    Assessment:  Principal Problem:    Pneumonia of left lung due to infectious organism  Active Problems:    Hyperlipidemia    Hypertension    Noncompliance    Afib    CKD (chronic kidney disease) stage 3, GFR 30-59 ml/min      Plan:  PNA - LQN D2 - likely etiology is Coronavirus OC43 but will continue w/ LQN to cover any secondary bacterial etiologies    AF w/ bradycardia and metoprolol held this am - on ASA - Cards consulted and d/w Dr Louie and is not a candidate for PPM - echo report pending   -plans for bid metoprolol at 12.5mg and will observe - no AC per Cards and will increase ASA to 325mg   -on Lovenox for prophylaxis   -stress as outpt if patient will f/up post abx    ARF - resolved - SLIVF though probable CKD3    HLD - statin    High likelihood this patient will leave AMA (he is AOx3 and can make his own medical decisions even if they aren't good ones) - his noncompliance both w/ being forthcoming w/ medical history as well following orders and medical recommendations is ultimately going to lead to his demise - declines ZORAN evaluation and refuses to use CPAP which is directing impacting his cardiac health in addition to poor choices and morbid obesity w/ BMI 49+      Plans for DC 1/14 pending HR    Dustin Hitchcock MD  1/13/2017  3:25 PM

## 2017-01-13 NOTE — PLAN OF CARE
Problem: Patient Care Overview (Adult)  Goal: Plan of Care Review  Outcome: Ongoing (interventions implemented as appropriate)    01/13/17 1100 01/13/17 3737   Coping/Psychosocial Response Interventions   Plan Of Care Reviewed With patient --    Patient Care Overview   Progress --  no change   Outcome Evaluation   Outcome Summary/Follow up Plan --  Pt is still very resistant to teaching and not willing to listen. IVF's d/c'd today. HR dropped to 40s this afternoon while pt snoozing. Metoprolol held this morning and decreased to 12.5 to start this evening and to hold for HR less than 50. Continue to monitor over night. If no significant bradycardia overnight then possibly home in the morning       Goal: Adult Individualization and Mutuality  Outcome: Ongoing (interventions implemented as appropriate)  Goal: Discharge Needs Assessment  Outcome: Ongoing (interventions implemented as appropriate)    Problem: Respiratory Insufficiency (Adult)  Goal: Identify Related Risk Factors and Signs and Symptoms  Outcome: Ongoing (interventions implemented as appropriate)  Goal: Acid/Base Balance  Outcome: Ongoing (interventions implemented as appropriate)  Goal: Effective Ventilation  Outcome: Ongoing (interventions implemented as appropriate)    Problem: Tissue Perfusion, Ineffective Peripheral (Adult)  Goal: Identify Related Risk Factors and Signs and Symptoms  Outcome: Ongoing (interventions implemented as appropriate)  Goal: Adequate Tissue Perfusion  Outcome: Ongoing (interventions implemented as appropriate)

## 2017-01-13 NOTE — PLAN OF CARE
Problem: Patient Care Overview (Adult)  Goal: Plan of Care Review  Outcome: Ongoing (interventions implemented as appropriate)    01/13/17 1043   Coping/Psychosocial Response Interventions   Plan Of Care Reviewed With patient   Patient Care Overview   Progress no change   Outcome Evaluation   Outcome Summary/Follow up Plan Pt's heart rate dipped to 40's often and 30's a few times throughout night shift. Several 3.1 second pauses, one 3.5 second pause. Notified MD on call. Pt c/o pain in sternal area. Denies that it is cardiac related, but only a result of his powerful coughs. Pt. Irritable at times, but mostly cooperative.        Goal: Adult Individualization and Mutuality  Outcome: Ongoing (interventions implemented as appropriate)  Goal: Discharge Needs Assessment  Outcome: Ongoing (interventions implemented as appropriate)    Problem: Respiratory Insufficiency (Adult)  Goal: Identify Related Risk Factors and Signs and Symptoms  Outcome: Ongoing (interventions implemented as appropriate)  Goal: Acid/Base Balance  Outcome: Ongoing (interventions implemented as appropriate)  Goal: Effective Ventilation  Outcome: Ongoing (interventions implemented as appropriate)    Problem: Tissue Perfusion, Ineffective Peripheral (Adult)  Goal: Identify Related Risk Factors and Signs and Symptoms  Outcome: Ongoing (interventions implemented as appropriate)  Goal: Adequate Tissue Perfusion  Outcome: Ongoing (interventions implemented as appropriate)

## 2017-01-14 VITALS
TEMPERATURE: 97.5 F | RESPIRATION RATE: 16 BRPM | BODY MASS INDEX: 49.17 KG/M2 | DIASTOLIC BLOOD PRESSURE: 88 MMHG | HEIGHT: 64 IN | SYSTOLIC BLOOD PRESSURE: 180 MMHG | WEIGHT: 288 LBS | OXYGEN SATURATION: 95 % | HEART RATE: 68 BPM

## 2017-01-14 PROCEDURE — 99232 SBSQ HOSP IP/OBS MODERATE 35: CPT | Performed by: INTERNAL MEDICINE

## 2017-01-14 RX ORDER — LEVOFLOXACIN 750 MG/1
750 TABLET ORAL DAILY
Refills: 0
Start: 2017-01-14 | End: 2017-01-23

## 2017-01-14 RX ORDER — ATORVASTATIN CALCIUM 40 MG/1
40 TABLET, FILM COATED ORAL NIGHTLY
Start: 2017-01-14 | End: 2017-01-23

## 2017-01-14 RX ADMIN — FLUTICASONE PROPIONATE 2 SPRAY: 50 SPRAY, METERED NASAL at 08:28

## 2017-01-14 RX ADMIN — ASPIRIN 325 MG: 325 TABLET, DELAYED RELEASE ORAL at 08:28

## 2017-01-14 RX ADMIN — METOPROLOL TARTRATE 12.5 MG: 25 TABLET ORAL at 08:28

## 2017-01-14 NOTE — PLAN OF CARE
Problem: Patient Care Overview (Adult)  Goal: Plan of Care Review  Outcome: Ongoing (interventions implemented as appropriate)    01/14/17 0707   Outcome Evaluation   Outcome Summary/Follow up Plan HR MAINLY IN THE 60'S OVERNIGHT. NOTED SOME ELEVATION IN B/P, ASYMPTOMATIC. C/O CHEST DISCOMFORT WITH COUGHING BUT REFUSED PAIN MEDICATION.         Problem: Respiratory Insufficiency (Adult)  Goal: Identify Related Risk Factors and Signs and Symptoms  Outcome: Ongoing (interventions implemented as appropriate)    Problem: Tissue Perfusion, Ineffective Peripheral (Adult)  Goal: Identify Related Risk Factors and Signs and Symptoms  Outcome: Ongoing (interventions implemented as appropriate)

## 2017-01-14 NOTE — DISCHARGE SUMMARY
DATE OF ADMISSION: 01/12/2017  DATE OF DISCHARGE: 01/14/2017    DISCHARGE DIAGNOSES:  1. Left-sided pneumonia, most likely viral, though also covering for superimposed bacterial etiology.   2. Atrial fibrillation with bradycardia. See Hospital Course.   3. Probable obstructive sleep apnea. See Hospital Course.   4. Acute renal failure, resolved, with probable CKD stage 3.   5. Hyperlipidemia, now on statin.   6. Morbid obesity with a BMI of 49.   7. Medical noncompliance.     HOSPITAL COURSE: Patient is a 56-year-old male initially admitted by my colleague, Dr. Alexander, for complaints of cough and shortness of breath. See his history and physical for further clarification of admission diagnoses. Basically, the patient underwent further evaluation with CT of the chest which demonstrated developing left base pneumonia. There was no associated adenopathy noted on CT of the chest. The patient was empirically placed on Levaquin, and by discharge he has completed 3 doses. I will continue to treat him for a 7-day total supply and give him an additional 4 days postdischarge. Taking care of this patient during this hospitalization was quite difficult. He was quite belligerent and argumentative on the day prior to discharge. I was quite huy with him at that juncture and ultimately it helped changed his attitude towards myself and staff as he was apologetic afterwards to both Dr. Louie with Cardiology as well as myself the following day. Ultimately, this patient’s medical noncompliance and not doing what he has been told in the past is starting to catch up with him. He definitely needs to undergo a sleep study as he most likely has underlying obstructive sleep apnea. He previously was in denial of this and declined it, but now he is stating that he would give it a try, so I am going to defer this to his PCP to order in an outpatient setting. While here, he was found to have atrial fibrillation as well as bradycardia. At  first it was believed to be new onset, but after further discussion with the patient he was not forthcoming with some of his medical history, and now he states this is something that has been going on for 20+ years. At this juncture I have discussed the case with Dr. Louie, and she has titrated up metoprolol tartrate at 12.5 mg b.i.d. with instructions to hold if his heart rate is less than 50. His CHADS2 scores do not qualify him for anticoagulation, and at this juncture she suggests aspirin 325 mg daily. She wants the patient to follow up with her in an outpatient setting after he completes antibiotics to consider outpatient stress test. Patient is stating that he will be compliant with this postdischarge, and ultimately the ball is in his corner on what he plans to do with further medical management. His acute renal failure is resolved and last check on his creatinine was 1.12, though his GFR is at 67, and I feel he likely has aspects of CKD stage 3. We did check a cholesterol panel while here, and his total cholesterol was 199 with a low HDL of 31 and an elevated LDL at 139, and the patient was started on Lipitor. All of his cardiac enzymes have remained negative, and the patient has been weaned from supplemental oxygen to room air. I do feel he is stable to be transitioned over to oral medications and complete his medication therapy as an outpatient.     DISPOSITION: To home. I will provide him a prescription to keep him off work through Wednesday, and I have asked him to contact his PCP to see if he could be squeezed into an appointment prior to ultimately clearing him to go back to work before he needs [place] paperwork.     FOLLOWUP:   1. Patient to call Cardiology for outpatient stress.   2. Patient to call PCP as previously stated in Hospital Course.   3. Will defer the ordering of a sleep study to his PCP at the time of followup.     DISCHARGE MEDICATIONS:  1. Aspirin 325 mg daily.   2. Lipitor 40 mg nightly.    3. Metoprolol tartrate 12.5 mg b.i.d. with instructions to hold if heart rate less than 50.   4. Levaquin 750 mg p.o. daily for an additional 4 days.   5. Flonase daily.   6. Prilosec 20 mg daily as needed.     DISCONTINUED MEDICATION: Metoprolol succinate.     Greater than 30 minutes spent organizing discharge. All questions answered to patient as well as sister at bedside.         Dustin Hitchcock MD  MADDEN:[email or web address]  D:   01/14/2017 12:53:03  T:   01/14/2017 15:48:49  Job ID:   [id number]  Document ID:   [id number]  cc:

## 2017-01-14 NOTE — PROGRESS NOTES
Hospital Follow Up    LOS:  LOS: 3 days   Patient Name: Semaj Narvaez  Age/Sex: 56 y.o. male  : 1960  MRN: [id number]    Date of Hospital Visit: 17  Length of Stay: 3  Encounter Provider: Marco Vasquez MD  Place of Service: [facility] CARDIOLOGY    Subjective:     Chief Complaint: Follow up for atrial fibrillation    Interval History: Continues to have 2-2.8 sec pauses- asymptomatic. Metoprolol tartrate decreased to 12.5mg BID yesterday.       Objective:     Objective:  Temp:  [97.2 °F (36.2 °C)-97.5 °F (36.4 °C)] 97.5 °F (36.4 °C)  Heart Rate:  [61-82] 68  Resp:  [16-18] 16  BP: (169-189)/() 180/88  Body mass index is 49.44 kg/(m^2).    Intake/Output Summary (Last 24 hours) at 17 1048  Last data filed at 17 0514   Gross per 24 hour   Intake   1090 ml   Output    100 ml   Net    990 ml     Last 3 weights    17  1749 17  2329 17  0901   Weight: 283 lb (128 kg) 288 lb (131 kg) 288 lb (131 kg)     Weight change:     Physical Exam:   General Appearance: Alert, cooperative, in no acute distress. AAOx4.   HEENT: Normocephalic.  Neck: Supple. No JVD. No Carotid bruit. No thyromegaly  Lungs: CTAB. Normal respiratory effort and rate.  Heart:: Irregular rate and rhythm, normal S1 and S2, no murmurs, gallops or rubs.  Abdomen: Soft, nontender, non-distended. positive bowel sounds  Extremities: Warm, no cyanosis, or clubbing. No edema.     Lab Review:     Results from last 7 days  Lab Units 17  0622 17  0633 17  1756   SODIUM mmol/L 143 142 143   POTASSIUM mmol/L 4.1 4.3 4.4   CHLORIDE mmol/L 107 105 101   TOTAL CO2 mmol/L 23.8 23.1 28.9   BUN mg/dL 30* 27* 22*   CREATININE mg/dL 1.12 1.26 1.78*   GLUCOSE mg/dL 140* 156* 128*   CALCIUM mg/dL 8.5* 8.5* 9.5   AST (SGOT) U/L  --   --  29   ALT (SGPT) U/L  --   --  32       Results from last 7 days  Lab Units 17  0622 17  1213 17  0633 17  0050  01/11/17  1756   TROPONIN T ng/mL <0.010 <0.010 <0.010 <0.010 <0.010       Results from last 7 days  Lab Units 01/13/17  0622 01/12/17  0633   WBC 10*3/mm3 8.13 4.57   HEMOGLOBIN g/dL 13.6* 14.9   HEMATOCRIT % 42.1 46.8   PLATELETS 10*3/mm3 230 273       Results from last 7 days  Lab Units 01/12/17  0633 01/11/17  2121   INR  1.08 0.97       Results from last 7 days  Lab Units 01/12/17  0633   MAGNESIUM mg/dL 2.1       Results from last 7 days  Lab Units 01/12/17  0633   CHOLESTEROL mg/dL 191   TRIGLYCERIDES mg/dL 107   HDL CHOL mg/dL 31*       Results from last 7 days  Lab Units 01/11/17  1756   PROBNP pg/mL 1118.0*       Results from last 7 days  Lab Units 01/12/17  0633   TSH mIU/mL 0.381         I reviewed the patient's new clinical results.          I personally viewed and interpreted the patient's EKG/Telemetry data.  Current Medications:   Scheduled Meds:    aspirin 325 mg Oral Daily   atorvastatin 40 mg Oral Nightly   enoxaparin 40 mg Subcutaneous Q24H   fluticasone 2 spray Nasal Daily   levoFLOXacin 750 mg Intravenous Q24H   metoprolol tartrate 12.5 mg Oral Q12H   pantoprazole 40 mg Oral Q AM     Continuous Infusions:     Allergies:  Allergies   Allergen Reactions   • Lisinopril        Assessment & Plan     Principal Problem:    Pneumonia of left lung due to infectious organism  Active Problems:    Hyperlipidemia    Hypertension    Noncompliance    Afib    CKD (chronic kidney disease) stage 3, GFR 30-59 ml/min      Rate better today.    Plan: continue same      Marco Vasquez MD  01/14/17

## 2017-01-16 ENCOUNTER — OFFICE VISIT (OUTPATIENT)
Dept: FAMILY MEDICINE CLINIC | Facility: CLINIC | Age: 57
End: 2017-01-16

## 2017-01-16 VITALS
DIASTOLIC BLOOD PRESSURE: 66 MMHG | WEIGHT: 288 LBS | OXYGEN SATURATION: 96 % | BODY MASS INDEX: 49.17 KG/M2 | HEART RATE: 80 BPM | HEIGHT: 64 IN | SYSTOLIC BLOOD PRESSURE: 104 MMHG

## 2017-01-16 DIAGNOSIS — J18.9 PNEUMONIA OF LEFT LUNG DUE TO INFECTIOUS ORGANISM, UNSPECIFIED PART OF LUNG: Primary | ICD-10-CM

## 2017-01-16 DIAGNOSIS — Z09 HOSPITAL DISCHARGE FOLLOW-UP: ICD-10-CM

## 2017-01-16 LAB
BACTERIA SPEC AEROBE CULT: NORMAL
BACTERIA SPEC AEROBE CULT: NORMAL

## 2017-01-16 PROCEDURE — 99214 OFFICE O/P EST MOD 30 MIN: CPT | Performed by: FAMILY MEDICINE

## 2017-01-16 RX ORDER — OMEPRAZOLE 20 MG/1
CAPSULE, DELAYED RELEASE ORAL
Qty: 30 CAPSULE | Refills: 4 | Status: SHIPPED | OUTPATIENT
Start: 2017-01-16 | End: 2017-06-30 | Stop reason: SDUPTHER

## 2017-01-16 NOTE — PATIENT INSTRUCTIONS
Exercise 30 minutes most days of the week  Sleep 6-8 hours each night if possible  Low fat, low cholesterol diet   we discussed prescribed medications and how to take them   make sure you get results of any labs/studies ordered today  Low glycemic index diet   Off work 7 days  See me 7 days

## 2017-01-16 NOTE — PROGRESS NOTES
"Subjective   Semaj Narvaez is a 56 y.o. male.     History of Present Illness   Chief Complaint:   Chief Complaint   Patient presents with   • Pneumonia     hospital follow up   • Shortness of Breath       Semaj Narvaez 56 y.o. male who presents today for hospital follow up. He was in [facility] 1/12/17-1/14/17 for pneumonia. He is feeling better. He states that he was taking lopressor 50 mg once daily and in the hospital is was changed to lopressor 25 mg .5 tab daily. He says that his BP at home has been up and down.    he has a history of   Patient Active Problem List   Diagnosis   • Cellulitis and abscess   • GERD (gastroesophageal reflux disease)   • Hyperlipidemia   • Hypertension   • DDD (degenerative disc disease), lumbosacral   • Low back pain   • Spider bite   • Pneumonia of left lung due to infectious organism   • Noncompliance   • Afib   • CKD (chronic kidney disease) stage 3, GFR 30-59 ml/min   .  Since the last visit, he has overall felt well.  he has been compliant with   Current Outpatient Prescriptions:   •  aspirin  MG EC tablet, Take 1 tablet by mouth Daily., Disp: , Rfl: 0  •  atorvastatin (LIPITOR) 40 MG tablet, Take 1 tablet by mouth Every Night., Disp: , Rfl:   •  fluticasone (FLONASE) 50 MCG/ACT nasal spray, 2 sprays into each nostril Daily for 30 days., Disp: 1 each, Rfl: 5  •  levoFLOXacin (LEVAQUIN) 750 MG tablet, Take 1 tablet by mouth Daily., Disp: , Rfl: 0  •  metoprolol tartrate (LOPRESSOR) 25 MG tablet, Take 0.5 tablets by mouth Every 12 (Twelve) Hours., Disp: , Rfl:   •  omeprazole (priLOSEC) 20 MG capsule, TAKE ONE CAPSULE BY MOUTH DAILY, Disp: 30 capsule, Rfl: 4  •  omeprazole OTC (PriLOSEC OTC) 20 MG EC tablet, Take 20 mg by mouth As Needed., Disp: , Rfl: .  he denies medication side effects.    All of the chronic condition(s) listed above are stable w/o issues.    Visit Vitals   • /66   • Pulse 80   • Ht 64\" (162.6 cm)   • Wt 288 lb (131 kg)   • SpO2 96%   • BMI 49.44 kg/m2 "       Results for orders placed or performed during the hospital encounter of 01/11/17   Blood Culture   Result Value Ref Range    Blood Culture No growth at 4 days    Blood Culture   Result Value Ref Range    Blood Culture No growth at 4 days    Respiratory Panel, PCR   Result Value Ref Range    ADENOVIRUS, PCR Not Detected Not Detected    Coronavirus 229E Not Detected Not Detected    Coronavirus HKU1 Not Detected Not Detected    Coronavirus NL63 Not Detected Not Detected    Coronavirus OC43 Detected (A) Not Detected    Human Metapneumovirus Not Detected Not Detected    Human Rhinovirus/Enterovirus Not Detected Not Detected    Influenza B PCR Not Detected Not Detected    Parainfluenza Virus 1 Not Detected Not Detected    Parainfluenza Virus 2 Not Detected Not Detected    Parainfluenza Virus 3 Not Detected Not Detected    Parainfluenza Virus 4 Not Detected Not Detected    Bordetella pertussis pcr Not Detected Not Detected    Influenza 2009 H1N1 by PCR Not Detected Not Detected    Chlamydophila pneumoniae PCR Not Detected Not Detected    Mycoplasma pneumo by PCR Not Detected Not Detected    Influenza A PCR Not Detected Not Detected    Influenza A H3 Not Detected Not Detected    Influenza A H1 Not Detected Not Detected    RSV, PCR Detected (A) Not Detected   S. Pneumo Ag Urine or CSF   Result Value Ref Range    Strep Pneumo Ag Negative Negative   Comprehensive Metabolic Panel   Result Value Ref Range    Glucose 128 (H) 65 - 99 mg/dL    BUN 22 (H) 6 - 20 mg/dL    Creatinine 1.78 (H) 0.76 - 1.27 mg/dL    Sodium 143 136 - 145 mmol/L    Potassium 4.4 3.5 - 5.2 mmol/L    Chloride 101 98 - 107 mmol/L    CO2 28.9 22.0 - 29.0 mmol/L    Calcium 9.5 8.6 - 10.5 mg/dL    Total Protein 7.4 6.0 - 8.5 g/dL    Albumin 4.50 3.50 - 5.20 g/dL    ALT (SGPT) 32 1 - 41 U/L    AST (SGOT) 29 1 - 40 U/L    Alkaline Phosphatase 83 39 - 117 U/L    Total Bilirubin 0.5 0.1 - 1.2 mg/dL    eGFR Non African Amer 39 (L) >60 mL/min/1.73    Globulin  2.9 gm/dL    A/G Ratio 1.6 g/dL    BUN/Creatinine Ratio 12.4 7.0 - 25.0    Anion Gap 13.1 mmol/L   BNP   Result Value Ref Range    proBNP 1118.0 (H) 5.0 - 900.0 pg/mL   Troponin   Result Value Ref Range    Troponin T <0.010 0.000 - 0.030 ng/mL   CBC Auto Differential   Result Value Ref Range    WBC 8.28 4.50 - 10.70 10*3/mm3    RBC 5.27 4.60 - 6.00 10*6/mm3    Hemoglobin 17.1 13.7 - 17.6 g/dL    Hematocrit 51.1 40.4 - 52.2 %    MCV 97.0 (H) 79.8 - 96.2 fL    MCH 32.4 27.0 - 32.7 pg    MCHC 33.5 32.6 - 36.4 g/dL    RDW 13.1 11.5 - 14.5 %    RDW-SD 46.5 37.0 - 54.0 fl    MPV 11.0 6.0 - 12.0 fL    Platelets 311 140 - 500 10*3/mm3    Neutrophil % 69.8 42.7 - 76.0 %    Lymphocyte % 15.0 (L) 19.6 - 45.3 %    Monocyte % 13.9 (H) 5.0 - 12.0 %    Eosinophil % 0.7 0.3 - 6.2 %    Basophil % 0.2 0.0 - 1.5 %    Immature Grans % 0.4 0.0 - 0.5 %    Neutrophils, Absolute 5.78 1.90 - 8.10 10*3/mm3    Lymphocytes, Absolute 1.24 0.90 - 4.80 10*3/mm3    Monocytes, Absolute 1.15 0.20 - 1.20 10*3/mm3    Eosinophils, Absolute 0.06 0.00 - 0.70 10*3/mm3    Basophils, Absolute 0.02 0.00 - 0.20 10*3/mm3    Immature Grans, Absolute 0.03 0.00 - 0.03 10*3/mm3   Protime-INR   Result Value Ref Range    Protime 12.5 11.7 - 14.2 Seconds    INR 0.97 0.90 - 1.10   Lactic Acid, Plasma   Result Value Ref Range    Lactate 1.1 0.5 - 2.0 mmol/L   Procalcitonin   Result Value Ref Range    Procalcitonin 0.14 0.10 - 0.25 ng/mL   Troponin   Result Value Ref Range    Troponin T <0.010 0.000 - 0.030 ng/mL   Troponin   Result Value Ref Range    Troponin T <0.010 0.000 - 0.030 ng/mL   Protime-INR   Result Value Ref Range    Protime 13.6 11.7 - 14.2 Seconds    INR 1.08 0.90 - 1.10   TSH   Result Value Ref Range    TSH 0.381 0.270 - 4.200 mIU/mL   Lipid Panel   Result Value Ref Range    Total Cholesterol 191 0 - 200 mg/dL    Triglycerides 107 0 - 150 mg/dL    HDL Cholesterol 31 (L) 40 - 60 mg/dL    LDL Cholesterol  139 (H) 0 - 100 mg/dL    VLDL Cholesterol 21.4 5 -  40 mg/dL    LDL/HDL Ratio 4.47    Magnesium   Result Value Ref Range    Magnesium 2.1 1.6 - 2.6 mg/dL   Basic Metabolic Panel   Result Value Ref Range    Glucose 156 (H) 65 - 99 mg/dL    BUN 27 (H) 6 - 20 mg/dL    Creatinine 1.26 0.76 - 1.27 mg/dL    Sodium 142 136 - 145 mmol/L    Potassium 4.3 3.5 - 5.2 mmol/L    Chloride 105 98 - 107 mmol/L    CO2 23.1 22.0 - 29.0 mmol/L    Calcium 8.5 (L) 8.6 - 10.5 mg/dL    eGFR Non African Amer 59 (L) >60 mL/min/1.73    BUN/Creatinine Ratio 21.4 7.0 - 25.0    Anion Gap 13.9 mmol/L   CBC Auto Differential   Result Value Ref Range    WBC 4.57 4.50 - 10.70 10*3/mm3    RBC 4.67 4.60 - 6.00 10*6/mm3    Hemoglobin 14.9 13.7 - 17.6 g/dL    Hematocrit 46.8 40.4 - 52.2 %    .2 (H) 79.8 - 96.2 fL    MCH 31.9 27.0 - 32.7 pg    MCHC 31.8 (L) 32.6 - 36.4 g/dL    RDW 13.4 11.5 - 14.5 %    RDW-SD 49.2 37.0 - 54.0 fl    MPV 10.8 6.0 - 12.0 fL    Platelets 273 140 - 500 10*3/mm3    Neutrophil % 80.1 (H) 42.7 - 76.0 %    Lymphocyte % 17.3 (L) 19.6 - 45.3 %    Monocyte % 2.4 (L) 5.0 - 12.0 %    Eosinophil % 0.0 (L) 0.3 - 6.2 %    Basophil % 0.2 0.0 - 1.5 %    Immature Grans % 0.0 0.0 - 0.5 %    Neutrophils, Absolute 3.66 1.90 - 8.10 10*3/mm3    Lymphocytes, Absolute 0.79 (L) 0.90 - 4.80 10*3/mm3    Monocytes, Absolute 0.11 (L) 0.20 - 1.20 10*3/mm3    Eosinophils, Absolute 0.00 0.00 - 0.70 10*3/mm3    Basophils, Absolute 0.01 0.00 - 0.20 10*3/mm3    Immature Grans, Absolute 0.00 0.00 - 0.03 10*3/mm3   Urinalysis With / Culture If Indicated   Result Value Ref Range    Color, UA Yellow Yellow, Straw    Appearance, UA Cloudy (A) Clear    pH, UA <=5.0 5.0 - 8.0    Specific Gravity, UA 1.028 1.005 - 1.030    Glucose, UA Negative Negative    Ketones, UA Negative Negative    Bilirubin, UA Negative Negative    Blood, UA Negative Negative    Protein, UA Negative Negative    Leuk Esterase, UA Negative Negative    Nitrite, UA Negative Negative    Urobilinogen, UA 0.2 E.U./dL 0.2 - 1.0 E.U./dL    Troponin   Result Value Ref Range    Troponin T <0.010 0.000 - 0.030 ng/mL   Basic Metabolic Panel   Result Value Ref Range    Glucose 140 (H) 65 - 99 mg/dL    BUN 30 (H) 6 - 20 mg/dL    Creatinine 1.12 0.76 - 1.27 mg/dL    Sodium 143 136 - 145 mmol/L    Potassium 4.1 3.5 - 5.2 mmol/L    Chloride 107 98 - 107 mmol/L    CO2 23.8 22.0 - 29.0 mmol/L    Calcium 8.5 (L) 8.6 - 10.5 mg/dL    eGFR Non African Amer 67 >60 mL/min/1.73    BUN/Creatinine Ratio 26.8 (H) 7.0 - 25.0    Anion Gap 12.2 mmol/L   Troponin   Result Value Ref Range    Troponin T <0.010 0.000 - 0.030 ng/mL   CBC Auto Differential   Result Value Ref Range    WBC 8.13 4.50 - 10.70 10*3/mm3    RBC 4.24 (L) 4.60 - 6.00 10*6/mm3    Hemoglobin 13.6 (L) 13.7 - 17.6 g/dL    Hematocrit 42.1 40.4 - 52.2 %    MCV 99.3 (H) 79.8 - 96.2 fL    MCH 32.1 27.0 - 32.7 pg    MCHC 32.3 (L) 32.6 - 36.4 g/dL    RDW 13.3 11.5 - 14.5 %    RDW-SD 48.0 37.0 - 54.0 fl    MPV 10.7 6.0 - 12.0 fL    Platelets 230 140 - 500 10*3/mm3    Neutrophil % 68.3 42.7 - 76.0 %    Lymphocyte % 22.9 19.6 - 45.3 %    Monocyte % 8.2 5.0 - 12.0 %    Eosinophil % 0.4 0.3 - 6.2 %    Basophil % 0.2 0.0 - 1.5 %    Immature Grans % 0.0 0.0 - 0.5 %    Neutrophils, Absolute 5.55 1.90 - 8.10 10*3/mm3    Lymphocytes, Absolute 1.86 0.90 - 4.80 10*3/mm3    Monocytes, Absolute 0.67 0.20 - 1.20 10*3/mm3    Eosinophils, Absolute 0.03 0.00 - 0.70 10*3/mm3    Basophils, Absolute 0.02 0.00 - 0.20 10*3/mm3    Immature Grans, Absolute 0.00 0.00 - 0.03 10*3/mm3   Adult Transthoracic Echo Complete With Contrast   Result Value Ref Range    BSA 2.3 m^2    IVSd 1.0 cm    LVIDd 5.3 cm    LVIDs 3.6 cm    LVPWd 1.1 cm    IVS/LVPW 0.91     FS 32.1 %    EDV(Teich) 135.3 ml    ESV(Teich) 54.4 ml    EF(Teich) 59.8 %    EDV(cubed) 148.9 ml    ESV(cubed) 46.7 ml    EF(cubed) 68.7 %    LV mass(C)d 213.9 grams    LV mass(C)dI 93.6 grams/m^2    SV(Teich) 80.9 ml    SI(Teich) 35.4 ml/m^2    SV(cubed) 102.2 ml    SI(cubed) 44.8 ml/m^2     Ao root diam 3.8 cm    Ao root area 11.3 cm^2    ACS 2.2 cm    asc Aorta Diam 3.5 cm    LVOT diam 2.5 cm    LVOT area 4.9 cm^2    LVOT area(traced) 4.9 cm^2    RVOT diam 2.2 cm    RVOT area 3.8 cm^2    LVLd ap4 9.0 cm    EDV(MOD-sp4) 176.0 ml    LVLs ap4 7.7 cm    ESV(MOD-sp4) 59.0 ml    EF(MOD-sp4) 66.5 %    LVLd ap2 9.0 cm    EDV(MOD-sp2) 188.0 ml    LVLs ap2 8.0 cm    ESV(MOD-sp2) 64.0 ml    EF(MOD-sp2) 66.0 %    SV(MOD-sp4) 117.0 ml    SI(MOD-sp4) 51.2 ml/m^2    SV(MOD-sp2) 124.0 ml    SI(MOD-sp2) 54.3 ml/m^2    Ao root area (BSA corrected) 1.7     Ao root area (BSA corrected) 66.0 ml/m^2    CONTRAST EF 4CH 66.5 ml/m^2    LV Diastolic corrected for BSA 77.1 ml/m^2    LV Systolic corrected for BSA 25.8 ml/m^2    MV A dur 127.0 sec    MV E max meño 113.5 cm/sec    MV A max meño 33.4 cm/sec    MV E/A 3.4     MV V2 mean 58.2 cm/sec    MV mean PG 2.0 mmHg    MV V2 VTI 40.9 cm    MVA(VTI) 2.6 cm^2    MV P1/2t max meño 128.0 cm/sec    MV P1/2t 75.9 msec    MVA(P1/2t) 2.9 cm^2    MV dec slope 494.0 cm/sec^2    MV dec time 157.0 sec    Ao V2 mean 87.6 cm/sec    Ao mean PG 3.0 mmHg    Ao mean PG (full) 1.0 mmHg    Ao V2 VTI 25.0 cm    MIGUEL ANGEL(I,A) 4.3 cm^2    MIGUEL ANGEL(I,D) 4.3 cm^2    LV V1 mean PG 2.0 mmHg    LV V1 mean 73.1 cm/sec    LV V1 VTI 21.7 cm    SV(Ao) 283.5 ml    SI(Ao) 124.1 ml/m^2    SV(LVOT) 106.5 ml    SV(RVOT) 44.9 ml    SI(LVOT) 46.6 ml/m^2    PA V2 max 85.5 cm/sec    PA max PG 2.9 mmHg    PA acc slope 25.7 cm/sec^2    PA acc time 0.11 sec    RV V1 mean PG 1.0 mmHg    RV V1 mean 40.0 cm/sec    RV V1 VTI 11.8 cm    PA pr(Accel) 31.3 mmHg    Pulm Sys Meño 29.4 cm/sec    Pulm Dubon Meño 57.1 cm/sec    Pulm S/D 0.51     Qp/Qs 0.42     Pulm A Revs Dur 92.0 sec    Pulm A Revs Meño 20.3 cm/sec    MVA P1/2T LCG 1.7 cm^2     CV ECHO MARCUS - BZI_BMI 49.4 kilograms/m^2     CV ECHO MARCUS - BSA(HAYCOCK) 2.5 m^2     CV ECHO MARCUS - BZI_METRIC_WEIGHT 130.6 kg     CV ECHO MARCUS - BZI_METRIC_HEIGHT 162.6 cm    TDI S' 13.00 cm/sec     RV Base 4.50 cm    E/E' ratio 16.5     LA Volume Index 30.3 mL/m2    Ao pk meño 1.2 cm/sec    Lat Peak E' Meño 6.0 cm/sec    LV V1 max 107.0 cm/sec    Med Peak E' Meño 8.00 cm/sec    TAPSE (>1.6) 2.60 cm2    Echo EF Estimated 67 %   Light Blue Top   Result Value Ref Range    Extra Tube hold for add-on    Gold Top - SST   Result Value Ref Range    Extra Tube Hold for add-ons.          The following portions of the patient's history were reviewed and updated as appropriate: allergies, current medications, past family history, past medical history, past social history, past surgical history and problem list.    Review of Systems   Constitutional: Negative for activity change, appetite change and unexpected weight change.   HENT: Negative for nosebleeds and trouble swallowing.    Eyes: Negative for pain and visual disturbance.   Respiratory: Negative for chest tightness, shortness of breath and wheezing.    Cardiovascular: Negative for chest pain and palpitations.   Gastrointestinal: Negative for abdominal pain and blood in stool.   Endocrine: Negative.    Genitourinary: Negative for difficulty urinating and hematuria.   Musculoskeletal: Negative for joint swelling.   Skin: Negative for color change and rash.   Allergic/Immunologic: Negative.    Neurological: Negative for syncope and speech difficulty.   Hematological: Negative for adenopathy.   Psychiatric/Behavioral: Negative for agitation and confusion.   All other systems reviewed and are negative.      Objective   Physical Exam   Constitutional: He is oriented to person, place, and time. He appears well-developed.   HENT:   Nose: Nose normal.   Mouth/Throat: Oropharynx is clear and moist.   Eyes: EOM are normal. Pupils are equal, round, and reactive to light.   Neck: Normal range of motion. No thyromegaly present.   Cardiovascular: Normal rate and regular rhythm.    Pulmonary/Chest: Effort normal. He has wheezes. He has no rales.   Musculoskeletal: Normal range of  motion.   Lymphadenopathy:     He has no cervical adenopathy.   Neurological: He is alert and oriented to person, place, and time.   Skin: No rash noted.   Psychiatric: He has a normal mood and affect. His behavior is normal.   Nursing note and vitals reviewed.      Assessment/Plan   Semaj was seen today for pneumonia and shortness of breath.    Diagnoses and all orders for this visit:    Pneumonia of left lung due to infectious organism, unspecified part of lung    Hospital discharge follow-up

## 2017-01-16 NOTE — PROGRESS NOTES
Continued Stay Note  [facility]     Patient Name: Semaj Narvaez  MRN: [id number]  Today's Date: 1/16/2017    Admit Date: 1/11/2017          Discharge Plan       01/16/17 0927    Final Note    Final Note Plan home with roommate.  [facility] Pereira              Discharge Codes     None        Expected Discharge Date and Time     Expected Discharge Date Expected Discharge Time    Jan 14, 2017             Melba Fisher RN

## 2017-01-16 NOTE — MR AVS SNAPSHOT
Semaj Narvaez   1/16/2017 10:30 AM   Office Visit    Provider:  Luis Persaud MD   Department:  [facility]   Dept Phone:  [phone]                Your Full Care Plan              Your Updated Medication List          This list is accurate as of: 1/16/17  1:43 PM.  Always use your most recent med list.                aspirin 325 MG EC tablet   Take 1 tablet by mouth Daily.       atorvastatin 40 MG tablet   Commonly known as:  LIPITOR   Take 1 tablet by mouth Every Night.       fluticasone 50 MCG/ACT nasal spray   Commonly known as:  FLONASE   2 sprays into each nostril Daily for 30 days.       levoFLOXacin 750 MG tablet   Commonly known as:  LEVAQUIN   Take 1 tablet by mouth Daily.       metoprolol tartrate 25 MG tablet   Commonly known as:  LOPRESSOR   Take 0.5 tablets by mouth Every 12 (Twelve) Hours.       omeprazole 20 MG capsule   Commonly known as:  priLOSEC   TAKE ONE CAPSULE BY MOUTH DAILY       omeprazole OTC 20 MG EC tablet   Commonly known as:  PriLOSEC OTC               You Were Diagnosed With        Codes Comments    Pneumonia of left lung due to infectious organism, unspecified part of lung    -  Primary ICD-10-CM: J18.9  ICD-9-CM: 486     Hospital discharge follow-up     ICD-10-CM: Z09  ICD-9-CM: V67.59       Instructions    Exercise 30 minutes most days of the week  Sleep 6-8 hours each night if possible  Low fat, low cholesterol diet   we discussed prescribed medications and how to take them   make sure you get results of any labs/studies ordered today  Low glycemic index diet        Patient Instructions History      apomio Signup     [facility] apomio allows you to send messages to your doctor, view your test results, renew your prescriptions, schedule appointments, and more. To sign up, go to [employer] and click on the Sign Up Now link in the New User? box. Enter your [employer] Activation Code exactly as it appears  "below along with the last four digits of your Social Security Number and your Date of Birth () to complete the sign-up process. If you do not sign up before the expiration date, you must request a new code.    [employer] Activation Code: [email or web address]  Expires: 2017  1:02 PM    If you have questions, you can email [email or web address] or call [phone] to talk to our [employer] staff. Remember, [employer] is NOT to be used for urgent needs. For medical emergencies, dial 911.               Other Info from Your Visit           Allergies     Lisinopril        Reason for Visit     Pneumonia hospital follow up    Shortness of Breath           Vital Signs     Blood Pressure Pulse Height Weight Oxygen Saturation Body Mass Index    104/66 80 64\" (162.6 cm) 288 lb (131 kg) 96% 49.44 kg/m2    Smoking Status                   Never Smoker           Problems and Diagnoses Noted     Pneumonia of left lung due to infectious organism    Hospital discharge follow-up          "

## 2017-01-17 NOTE — PAYOR COMM NOTE
"Semaj Najera (56 y.o. Male)                                                             REF# [id number]                                       CONTACT=  EVANS PA                                     FAX  [phone]                                      #  [phone]        Date of Birth Social Security Number Address Home Phone MRN    [id number]  [id number] [facility] [address] [phone] [id number]    Yarsanism Marital Status          Presybeterian Single       Admission Date Admission Type Admitting Provider Attending Provider Department, Room/Bed    1/11/17 Emergency Tiago Alexander MD  [address], 464/1    Discharge Date Discharge Disposition Discharge Destination        1/14/2017 Home or Self Care             Attending Provider: (none)    Allergies:  Lisinopril    Isolation:  Contact   Infection:  RSV (01/12/17)   Code Status:  Prior    Ht:  64\" (162.6 cm)   Wt:  288 lb (131 kg)    Admission Cmt:  None   Principal Problem:  Pneumonia of left lung due to infectious organism [J18.9]                 Active Insurance as of 1/11/2017     Primary Coverage     Payor Plan Insurance Group Employer/Plan Group    [employer] PPO [id number]     Payor Plan Address Payor Plan Phone Number Effective From Effective To    [address] [phone] 1/1/2016     [place] [address]       Subscriber Name Subscriber Birth Date Member ID       SEMAJ NAJERA 1960 [id number]                 Emergency Contacts      (Rel.) Home Phone Work Phone Mobile Phone    Yemi Bruno (Roommate) [phone] -- --            Insurance Information                [employer] P[employer] Phone: [phone]    Subscriber: Semaj Najera Subscriber#: [id number]    Group#: [id number] Precert#:              Discharge Summary      Discharge Summaries signed by Dustin Hitchcock MD at 1/16/2017  5:53 PM              DATE OF ADMISSION: " 01/12/2017  DATE OF DISCHARGE: 01/14/2017    DISCHARGE DIAGNOSES:  1. Left-sided pneumonia, most likely viral, though also covering for superimposed bacterial etiology.   2. Atrial fibrillation with bradycardia. See Hospital Course.   3. Probable obstructive sleep apnea. See Hospital Course.   4. Acute renal failure, resolved, with probable CKD stage 3.   5. Hyperlipidemia, now on statin.   6. Morbid obesity with a BMI of 49.   7. Medical noncompliance.     HOSPITAL COURSE: Patient is a 56-year-old male initially admitted by my colleague, Dr. Alexander, for complaints of cough and shortness of breath. See his history and physical for further clarification of admission diagnoses. Basically, the patient underwent further evaluation with CT of the chest which demonstrated developing left base pneumonia. There was no associated adenopathy noted on CT of the chest. The patient was empirically placed on Levaquin, and by discharge he has completed 3 doses. I will continue to treat him for a 7-day total supply and give him an additional 4 days postdischarge. Taking care of this patient during this hospitalization was quite difficult. He was quite belligerent and argumentative on the day prior to discharge. I was quite huy with him at that juncture and ultimately it helped changed his attitude towards myself and staff as he was apologetic afterwards to both Dr. Louie with Cardiology as well as myself the following day. Ultimately, this patient’s medical noncompliance and not doing what he has been told in the past is starting to catch up with him. He definitely needs to undergo a sleep study as he most likely has underlying obstructive sleep apnea. He previously was in denial of this and declined it, but now he is stating that he would give it a try, so I am going to defer this to his PCP to order in an outpatient setting. While here, he was found to have atrial fibrillation as well as bradycardia. At first it was believed to  be new onset, but after further discussion with the patient he was not forthcoming with some of his medical history, and now he states this is something that has been going on for 20+ years. At this juncture I have discussed the case with Dr. Louie, and she has titrated up metoprolol tartrate at 12.5 mg b.i.d. with instructions to hold if his heart rate is less than 50. His CHADS2 scores do not qualify him for anticoagulation, and at this juncture she suggests aspirin 325 mg daily. She wants the patient to follow up with her in an outpatient setting after he completes antibiotics to consider outpatient stress test. Patient is stating that he will be compliant with this postdischarge, and ultimately the ball is in his corner on what he plans to do with further medical management. His acute renal failure is resolved and last check on his creatinine was 1.12, though his GFR is at 67, and I feel he likely has aspects of CKD stage 3. We did check a cholesterol panel while here, and his total cholesterol was 199 with a low HDL of 31 and an elevated LDL at 139, and the patient was started on Lipitor. All of his cardiac enzymes have remained negative, and the patient has been weaned from supplemental oxygen to room air. I do feel he is stable to be transitioned over to oral medications and complete his medication therapy as an outpatient.     DISPOSITION: To home. I will provide him a prescription to keep him off work through Wednesday, and I have asked him to contact his PCP to see if he could be squeezed into an appointment prior to ultimately clearing him to go back to work before he needs [place] paperwork.     FOLLOWUP:   1. Patient to call Cardiology for outpatient stress.   2. Patient to call PCP as previously stated in Hospital Course.   3. Will defer the ordering of a sleep study to his PCP at the time of followup.     DISCHARGE MEDICATIONS:  1. Aspirin 325 mg daily.   2. Lipitor 40 mg nightly.   3. Metoprolol tartrate  12.5 mg b.i.d. with instructions to hold if heart rate less than 50.   4. Levaquin 750 mg p.o. daily for an additional 4 days.   5. Flonase daily.   6. Prilosec 20 mg daily as needed.     DISCONTINUED MEDICATION: Metoprolol succinate.     Greater than 30 minutes spent organizing discharge. All questions answered to patient as well as sister at bedside.         Dustin Hitchcock MD  MADDEN:  D:   01/14/2017 12:53:03  T:   01/14/2017 15:48:49  Job ID:   [id number]  Document ID:   [id number]  cc:          Electronically signed by Dustin Hitchcock MD at 1/16/2017  5:53 PM      Dustin Hitchcock MD at 1/14/2017 12:57 PM          Dc#[id number]     Electronically signed by Dustin Hitchcock MD at 1/14/2017 12:57 PM

## 2017-01-23 ENCOUNTER — OFFICE VISIT (OUTPATIENT)
Dept: FAMILY MEDICINE CLINIC | Facility: CLINIC | Age: 57
End: 2017-01-23

## 2017-01-23 VITALS
TEMPERATURE: 98 F | BODY MASS INDEX: 48.32 KG/M2 | DIASTOLIC BLOOD PRESSURE: 90 MMHG | WEIGHT: 283 LBS | HEART RATE: 74 BPM | HEIGHT: 64 IN | RESPIRATION RATE: 16 BRPM | SYSTOLIC BLOOD PRESSURE: 128 MMHG | OXYGEN SATURATION: 95 %

## 2017-01-23 DIAGNOSIS — J18.9 PNEUMONIA OF LEFT LUNG DUE TO INFECTIOUS ORGANISM, UNSPECIFIED PART OF LUNG: Primary | ICD-10-CM

## 2017-01-23 DIAGNOSIS — I48.20 CHRONIC ATRIAL FIBRILLATION (HCC): ICD-10-CM

## 2017-01-23 DIAGNOSIS — R73.9 HYPERGLYCEMIA: ICD-10-CM

## 2017-01-23 DIAGNOSIS — I10 ESSENTIAL HYPERTENSION: ICD-10-CM

## 2017-01-23 DIAGNOSIS — E78.2 MIXED HYPERLIPIDEMIA: ICD-10-CM

## 2017-01-23 PROCEDURE — 99214 OFFICE O/P EST MOD 30 MIN: CPT | Performed by: FAMILY MEDICINE

## 2017-01-23 RX ORDER — ATORVASTATIN CALCIUM 20 MG/1
20 TABLET, FILM COATED ORAL DAILY
Qty: 30 TABLET | Refills: 5 | Status: SHIPPED | OUTPATIENT
Start: 2017-01-23 | End: 2017-01-24 | Stop reason: SDUPTHER

## 2017-01-23 NOTE — MR AVS SNAPSHOT
Semaj Narvaez   1/23/2017 2:00 PM   Office Visit    Provider:  Luis Persaud MD   Department:  [facility]   Dept Phone:  [phone]                Your Full Care Plan              Today's Medication Changes          These changes are accurate as of: 1/23/17  3:20 PM.  If you have any questions, ask your nurse or doctor.               Medication(s)that have changed:     atorvastatin 20 MG tablet   Commonly known as:  LIPITOR   Take 1 tablet by mouth Daily for 30 days.   What changed:    - medication strength  - how much to take  - when to take this   Changed by:  Luis Persaud MD         Stop taking medication(s)listed here:     levoFLOXacin 750 MG tablet   Commonly known as:  LEVAQUIN   Stopped by:  Luis Persaud MD                Where to Get Your Medications      These medications were sent to [facility] [address] [place] [address] [facility] - [phone] [facility] [phone] [place] [address]     Phone:  [phone]     atorvastatin 20 MG tablet    metoprolol tartrate 25 MG tablet                  Your Updated Medication List          This list is accurate as of: 1/23/17  3:20 PM.  Always use your most recent med list.                aspirin 325 MG EC tablet   Take 1 tablet by mouth Daily.       atorvastatin 20 MG tablet   Commonly known as:  LIPITOR   Take 1 tablet by mouth Daily for 30 days.       fluticasone 50 MCG/ACT nasal spray   Commonly known as:  FLONASE   2 sprays into each nostril Daily for 30 days.       metoprolol tartrate 25 MG tablet   Commonly known as:  LOPRESSOR   Take 0.5 tablets by mouth Every 12 (Twelve) Hours for 30 days.       omeprazole 20 MG capsule   Commonly known as:  priLOSEC   TAKE ONE CAPSULE BY MOUTH DAILY       omeprazole OTC 20 MG EC tablet   Commonly known as:  PriLOSEC OTC               You Were Diagnosed With        Codes Comments    Pneumonia of left lung  "due to infectious organism, unspecified part of lung    -  Primary ICD-10-CM: J18.9  ICD-9-CM: 486     Mixed hyperlipidemia     ICD-10-CM: E78.2  ICD-9-CM: 272.2     Essential hypertension     ICD-10-CM: I10  ICD-9-CM: 401.9     Chronic atrial fibrillation     ICD-10-CM: I48.2  ICD-9-CM: 427.31     Hyperglycemia     ICD-10-CM: R73.9  ICD-9-CM: 790.29       Instructions    Rest  Increase fluids   RTC prn or 1 week if not better   Take mucinex DM  Take Zyrtec or Claritin   OTC cough med prn      Ibuprofen for pain and fever control                                                        Tylenol(acetominophen) for pain and fever control    Saline nasal spray 2 sprays to each nostril 6 times daily  bs measure 2 times per week         Patient Instructions History      [employer] Signup     [facility] [employer] allows you to send messages to your doctor, view your test results, renew your prescriptions, schedule appointments, and more. To sign up, go to [employer] and click on the Sign Up Now link in the New User? box. Enter your [employer] Activation Code exactly as it appears below along with the last four digits of your Social Security Number and your Date of Birth () to complete the sign-up process. If you do not sign up before the expiration date, you must request a new code.    True P[employer] Activation Code: [email or web address]  Expires: 2017  1:02 PM    If you have questions, you can email [email or web address] or call [phone] to talk to our [employer] staff. Remember, [employer] is NOT to be used for urgent needs. For medical emergencies, dial 911.               Other Info from Your Visit           Allergies     Lisinopril        Reason for Visit     Pneumonia           Vital Signs     Blood Pressure Pulse Temperature Respirations Height Weight    128/90 74 98 °F (36.7 °C) (Oral) 16 64\" (162.6 cm) 283 lb (128 kg)    Oxygen Saturation Body Mass Index Smoking Status             95% 48.58 kg/m2 Never " Smoker         Problems and Diagnoses Noted     Atrial fibrillation (irregular heartbeat)    High cholesterol or triglycerides    High blood pressure    Pneumonia of left lung due to infectious organism    Hyperglycemia

## 2017-01-23 NOTE — PROGRESS NOTES
"Subjective   Semaj Narvaez is a 56 y.o. male.     History of Present Illness   Chief Complaint:   Chief Complaint   Patient presents with   • Jaspal Narvaez 56 y.o. male who presents today for a 7 day follow up for pneumonia of the left lung. I reviewed all lab results.  He needs to see [facility] cardiology for stress test. wii do labs 3 months.   he has a history of   Patient Active Problem List   Diagnosis   • Cellulitis and abscess   • GERD (gastroesophageal reflux disease)   • Hyperlipidemia   • Hypertension   • DDD (degenerative disc disease), lumbosacral   • Low back pain   • Spider bite   • Pneumonia of left lung due to infectious organism   • Noncompliance   • Afib   • CKD (chronic kidney disease) stage 3, GFR 30-59 ml/min   .  Since the last visit, he has overall felt well.  he has been compliant with   Current Outpatient Prescriptions:   •  aspirin  MG EC tablet, Take 1 tablet by mouth Daily., Disp: , Rfl: 0  •  atorvastatin (LIPITOR) 40 MG tablet, Take 1 tablet by mouth Every Night., Disp: , Rfl:   •  fluticasone (FLONASE) 50 MCG/ACT nasal spray, 2 sprays into each nostril Daily for 30 days., Disp: 1 each, Rfl: 5  •  metoprolol tartrate (LOPRESSOR) 25 MG tablet, Take 0.5 tablets by mouth Every 12 (Twelve) Hours., Disp: , Rfl:   •  omeprazole (priLOSEC) 20 MG capsule, TAKE ONE CAPSULE BY MOUTH DAILY, Disp: 30 capsule, Rfl: 4  •  omeprazole OTC (PriLOSEC OTC) 20 MG EC tablet, Take 20 mg by mouth As Needed., Disp: , Rfl: .  he denies medication side effects.    All of the chronic condition(s) listed above are stable w/o issues.    Visit Vitals   • /90   • Pulse 74   • Temp 98 °F (36.7 °C) (Oral)   • Resp 16   • Ht 64\" (162.6 cm)   • Wt 283 lb (128 kg)   • SpO2 95%   • BMI 48.58 kg/m2       Results for orders placed or performed during the hospital encounter of 01/11/17   Blood Culture   Result Value Ref Range    Blood Culture No growth at 5 days    Blood Culture   Result Value Ref Range    " Blood Culture No growth at 5 days    Respiratory Panel, PCR   Result Value Ref Range    ADENOVIRUS, PCR Not Detected Not Detected    Coronavirus 229E Not Detected Not Detected    Coronavirus HKU1 Not Detected Not Detected    Coronavirus NL63 Not Detected Not Detected    Coronavirus OC43 Detected (A) Not Detected    Human Metapneumovirus Not Detected Not Detected    Human Rhinovirus/Enterovirus Not Detected Not Detected    Influenza B PCR Not Detected Not Detected    Parainfluenza Virus 1 Not Detected Not Detected    Parainfluenza Virus 2 Not Detected Not Detected    Parainfluenza Virus 3 Not Detected Not Detected    Parainfluenza Virus 4 Not Detected Not Detected    Bordetella pertussis pcr Not Detected Not Detected    Influenza 2009 H1N1 by PCR Not Detected Not Detected    Chlamydophila pneumoniae PCR Not Detected Not Detected    Mycoplasma pneumo by PCR Not Detected Not Detected    Influenza A PCR Not Detected Not Detected    Influenza A H3 Not Detected Not Detected    Influenza A H1 Not Detected Not Detected    RSV, PCR Detected (A) Not Detected   S. Pneumo Ag Urine or CSF   Result Value Ref Range    Strep Pneumo Ag Negative Negative   Comprehensive Metabolic Panel   Result Value Ref Range    Glucose 128 (H) 65 - 99 mg/dL    BUN 22 (H) 6 - 20 mg/dL    Creatinine 1.78 (H) 0.76 - 1.27 mg/dL    Sodium 143 136 - 145 mmol/L    Potassium 4.4 3.5 - 5.2 mmol/L    Chloride 101 98 - 107 mmol/L    CO2 28.9 22.0 - 29.0 mmol/L    Calcium 9.5 8.6 - 10.5 mg/dL    Total Protein 7.4 6.0 - 8.5 g/dL    Albumin 4.50 3.50 - 5.20 g/dL    ALT (SGPT) 32 1 - 41 U/L    AST (SGOT) 29 1 - 40 U/L    Alkaline Phosphatase 83 39 - 117 U/L    Total Bilirubin 0.5 0.1 - 1.2 mg/dL    eGFR Non African Amer 39 (L) >60 mL/min/1.73    Globulin 2.9 gm/dL    A/G Ratio 1.6 g/dL    BUN/Creatinine Ratio 12.4 7.0 - 25.0    Anion Gap 13.1 mmol/L   BNP   Result Value Ref Range    proBNP 1118.0 (H) 5.0 - 900.0 pg/mL   Troponin   Result Value Ref Range     Troponin T <0.010 0.000 - 0.030 ng/mL   CBC Auto Differential   Result Value Ref Range    WBC 8.28 4.50 - 10.70 10*3/mm3    RBC 5.27 4.60 - 6.00 10*6/mm3    Hemoglobin 17.1 13.7 - 17.6 g/dL    Hematocrit 51.1 40.4 - 52.2 %    MCV 97.0 (H) 79.8 - 96.2 fL    MCH 32.4 27.0 - 32.7 pg    MCHC 33.5 32.6 - 36.4 g/dL    RDW 13.1 11.5 - 14.5 %    RDW-SD 46.5 37.0 - 54.0 fl    MPV 11.0 6.0 - 12.0 fL    Platelets 311 140 - 500 10*3/mm3    Neutrophil % 69.8 42.7 - 76.0 %    Lymphocyte % 15.0 (L) 19.6 - 45.3 %    Monocyte % 13.9 (H) 5.0 - 12.0 %    Eosinophil % 0.7 0.3 - 6.2 %    Basophil % 0.2 0.0 - 1.5 %    Immature Grans % 0.4 0.0 - 0.5 %    Neutrophils, Absolute 5.78 1.90 - 8.10 10*3/mm3    Lymphocytes, Absolute 1.24 0.90 - 4.80 10*3/mm3    Monocytes, Absolute 1.15 0.20 - 1.20 10*3/mm3    Eosinophils, Absolute 0.06 0.00 - 0.70 10*3/mm3    Basophils, Absolute 0.02 0.00 - 0.20 10*3/mm3    Immature Grans, Absolute 0.03 0.00 - 0.03 10*3/mm3   Protime-INR   Result Value Ref Range    Protime 12.5 11.7 - 14.2 Seconds    INR 0.97 0.90 - 1.10   Lactic Acid, Plasma   Result Value Ref Range    Lactate 1.1 0.5 - 2.0 mmol/L   Procalcitonin   Result Value Ref Range    Procalcitonin 0.14 0.10 - 0.25 ng/mL   Troponin   Result Value Ref Range    Troponin T <0.010 0.000 - 0.030 ng/mL   Troponin   Result Value Ref Range    Troponin T <0.010 0.000 - 0.030 ng/mL   Protime-INR   Result Value Ref Range    Protime 13.6 11.7 - 14.2 Seconds    INR 1.08 0.90 - 1.10   TSH   Result Value Ref Range    TSH 0.381 0.270 - 4.200 mIU/mL   Lipid Panel   Result Value Ref Range    Total Cholesterol 191 0 - 200 mg/dL    Triglycerides 107 0 - 150 mg/dL    HDL Cholesterol 31 (L) 40 - 60 mg/dL    LDL Cholesterol  139 (H) 0 - 100 mg/dL    VLDL Cholesterol 21.4 5 - 40 mg/dL    LDL/HDL Ratio 4.47    Magnesium   Result Value Ref Range    Magnesium 2.1 1.6 - 2.6 mg/dL   Basic Metabolic Panel   Result Value Ref Range    Glucose 156 (H) 65 - 99 mg/dL    BUN 27 (H) 6 - 20  mg/dL    Creatinine 1.26 0.76 - 1.27 mg/dL    Sodium 142 136 - 145 mmol/L    Potassium 4.3 3.5 - 5.2 mmol/L    Chloride 105 98 - 107 mmol/L    CO2 23.1 22.0 - 29.0 mmol/L    Calcium 8.5 (L) 8.6 - 10.5 mg/dL    eGFR Non African Amer 59 (L) >60 mL/min/1.73    BUN/Creatinine Ratio 21.4 7.0 - 25.0    Anion Gap 13.9 mmol/L   CBC Auto Differential   Result Value Ref Range    WBC 4.57 4.50 - 10.70 10*3/mm3    RBC 4.67 4.60 - 6.00 10*6/mm3    Hemoglobin 14.9 13.7 - 17.6 g/dL    Hematocrit 46.8 40.4 - 52.2 %    .2 (H) 79.8 - 96.2 fL    MCH 31.9 27.0 - 32.7 pg    MCHC 31.8 (L) 32.6 - 36.4 g/dL    RDW 13.4 11.5 - 14.5 %    RDW-SD 49.2 37.0 - 54.0 fl    MPV 10.8 6.0 - 12.0 fL    Platelets 273 140 - 500 10*3/mm3    Neutrophil % 80.1 (H) 42.7 - 76.0 %    Lymphocyte % 17.3 (L) 19.6 - 45.3 %    Monocyte % 2.4 (L) 5.0 - 12.0 %    Eosinophil % 0.0 (L) 0.3 - 6.2 %    Basophil % 0.2 0.0 - 1.5 %    Immature Grans % 0.0 0.0 - 0.5 %    Neutrophils, Absolute 3.66 1.90 - 8.10 10*3/mm3    Lymphocytes, Absolute 0.79 (L) 0.90 - 4.80 10*3/mm3    Monocytes, Absolute 0.11 (L) 0.20 - 1.20 10*3/mm3    Eosinophils, Absolute 0.00 0.00 - 0.70 10*3/mm3    Basophils, Absolute 0.01 0.00 - 0.20 10*3/mm3    Immature Grans, Absolute 0.00 0.00 - 0.03 10*3/mm3   Urinalysis With / Culture If Indicated   Result Value Ref Range    Color, UA Yellow Yellow, Straw    Appearance, UA Cloudy (A) Clear    pH, UA <=5.0 5.0 - 8.0    Specific Gravity, UA 1.028 1.005 - 1.030    Glucose, UA Negative Negative    Ketones, UA Negative Negative    Bilirubin, UA Negative Negative    Blood, UA Negative Negative    Protein, UA Negative Negative    Leuk Esterase, UA Negative Negative    Nitrite, UA Negative Negative    Urobilinogen, UA 0.2 E.U./dL 0.2 - 1.0 E.U./dL   Troponin   Result Value Ref Range    Troponin T <0.010 0.000 - 0.030 ng/mL   Basic Metabolic Panel   Result Value Ref Range    Glucose 140 (H) 65 - 99 mg/dL    BUN 30 (H) 6 - 20 mg/dL    Creatinine 1.12 0.76 -  1.27 mg/dL    Sodium 143 136 - 145 mmol/L    Potassium 4.1 3.5 - 5.2 mmol/L    Chloride 107 98 - 107 mmol/L    CO2 23.8 22.0 - 29.0 mmol/L    Calcium 8.5 (L) 8.6 - 10.5 mg/dL    eGFR Non African Amer 67 >60 mL/min/1.73    BUN/Creatinine Ratio 26.8 (H) 7.0 - 25.0    Anion Gap 12.2 mmol/L   Troponin   Result Value Ref Range    Troponin T <0.010 0.000 - 0.030 ng/mL   CBC Auto Differential   Result Value Ref Range    WBC 8.13 4.50 - 10.70 10*3/mm3    RBC 4.24 (L) 4.60 - 6.00 10*6/mm3    Hemoglobin 13.6 (L) 13.7 - 17.6 g/dL    Hematocrit 42.1 40.4 - 52.2 %    MCV 99.3 (H) 79.8 - 96.2 fL    MCH 32.1 27.0 - 32.7 pg    MCHC 32.3 (L) 32.6 - 36.4 g/dL    RDW 13.3 11.5 - 14.5 %    RDW-SD 48.0 37.0 - 54.0 fl    MPV 10.7 6.0 - 12.0 fL    Platelets 230 140 - 500 10*3/mm3    Neutrophil % 68.3 42.7 - 76.0 %    Lymphocyte % 22.9 19.6 - 45.3 %    Monocyte % 8.2 5.0 - 12.0 %    Eosinophil % 0.4 0.3 - 6.2 %    Basophil % 0.2 0.0 - 1.5 %    Immature Grans % 0.0 0.0 - 0.5 %    Neutrophils, Absolute 5.55 1.90 - 8.10 10*3/mm3    Lymphocytes, Absolute 1.86 0.90 - 4.80 10*3/mm3    Monocytes, Absolute 0.67 0.20 - 1.20 10*3/mm3    Eosinophils, Absolute 0.03 0.00 - 0.70 10*3/mm3    Basophils, Absolute 0.02 0.00 - 0.20 10*3/mm3    Immature Grans, Absolute 0.00 0.00 - 0.03 10*3/mm3   Adult Transthoracic Echo Complete With Contrast   Result Value Ref Range    BSA 2.3 m^2    IVSd 1.0 cm    LVIDd 5.3 cm    LVIDs 3.6 cm    LVPWd 1.1 cm    IVS/LVPW 0.91     FS 32.1 %    EDV(Teich) 135.3 ml    ESV(Teich) 54.4 ml    EF(Teich) 59.8 %    EDV(cubed) 148.9 ml    ESV(cubed) 46.7 ml    EF(cubed) 68.7 %    LV mass(C)d 213.9 grams    LV mass(C)dI 93.6 grams/m^2    SV(Teich) 80.9 ml    SI(Teich) 35.4 ml/m^2    SV(cubed) 102.2 ml    SI(cubed) 44.8 ml/m^2    Ao root diam 3.8 cm    Ao root area 11.3 cm^2    ACS 2.2 cm    asc Aorta Diam 3.5 cm    LVOT diam 2.5 cm    LVOT area 4.9 cm^2    LVOT area(traced) 4.9 cm^2    RVOT diam 2.2 cm    RVOT area 3.8 cm^2    LVLd  ap4 9.0 cm    EDV(MOD-sp4) 176.0 ml    LVLs ap4 7.7 cm    ESV(MOD-sp4) 59.0 ml    EF(MOD-sp4) 66.5 %    LVLd ap2 9.0 cm    EDV(MOD-sp2) 188.0 ml    LVLs ap2 8.0 cm    ESV(MOD-sp2) 64.0 ml    EF(MOD-sp2) 66.0 %    SV(MOD-sp4) 117.0 ml    SI(MOD-sp4) 51.2 ml/m^2    SV(MOD-sp2) 124.0 ml    SI(MOD-sp2) 54.3 ml/m^2    Ao root area (BSA corrected) 1.7     Ao root area (BSA corrected) 66.0 ml/m^2    CONTRAST EF 4CH 66.5 ml/m^2    LV Diastolic corrected for BSA 77.1 ml/m^2    LV Systolic corrected for BSA 25.8 ml/m^2    MV A dur 127.0 sec    MV E max meño 113.5 cm/sec    MV A max meño 33.4 cm/sec    MV E/A 3.4     MV V2 mean 58.2 cm/sec    MV mean PG 2.0 mmHg    MV V2 VTI 40.9 cm    MVA(VTI) 2.6 cm^2    MV P1/2t max meño 128.0 cm/sec    MV P1/2t 75.9 msec    MVA(P1/2t) 2.9 cm^2    MV dec slope 494.0 cm/sec^2    MV dec time 157.0 sec    Ao V2 mean 87.6 cm/sec    Ao mean PG 3.0 mmHg    Ao mean PG (full) 1.0 mmHg    Ao V2 VTI 25.0 cm    MIGUEL ANGEL(I,A) 4.3 cm^2    MIGUEL ANGEL(I,D) 4.3 cm^2    LV V1 mean PG 2.0 mmHg    LV V1 mean 73.1 cm/sec    LV V1 VTI 21.7 cm    SV(Ao) 283.5 ml    SI(Ao) 124.1 ml/m^2    SV(LVOT) 106.5 ml    SV(RVOT) 44.9 ml    SI(LVOT) 46.6 ml/m^2    PA V2 max 85.5 cm/sec    PA max PG 2.9 mmHg    PA acc slope 25.7 cm/sec^2    PA acc time 0.11 sec    RV V1 mean PG 1.0 mmHg    RV V1 mean 40.0 cm/sec    RV V1 VTI 11.8 cm    PA pr(Accel) 31.3 mmHg    Pulm Sys Meño 29.4 cm/sec    Pulm Dubon Meño 57.1 cm/sec    Pulm S/D 0.51     Qp/Qs 0.42     Pulm A Revs Dur 92.0 sec    Pulm A Revs Meño 20.3 cm/sec    MVA P1/2T LCG 1.7 cm^2    BH CV ECHO MARCUS - BZI_BMI 49.4 kilograms/m^2     CV ECHO MARCUS - BSA([facility]) 2.5 m^2     CV ECHO MARCUS - BZI_METRIC_WEIGHT 130.6 kg     CV ECHO MARCUS - BZI_METRIC_HEIGHT 162.6 cm    TDI S' 13.00 cm/sec    RV Base 4.50 cm    E/E' ratio 16.5     LA Volume Index 30.3 mL/m2    Ao pk meño 1.2 cm/sec    Lat Peak E' Meño 6.0 cm/sec    LV V1 max 107.0 cm/sec    Med Peak E' Meño 8.00 cm/sec    TAPSE (>1.6) 2.60 cm2     Echo EF Estimated 67 %   Light Blue Top   Result Value Ref Range    Extra Tube hold for add-on    Gold Top - SST   Result Value Ref Range    Extra Tube Hold for add-ons.          The following portions of the patient's history were reviewed and updated as appropriate: allergies, current medications, past family history, past medical history, past social history, past surgical history and problem list.    Review of Systems   Constitutional: Positive for fatigue. Negative for activity change, appetite change and unexpected weight change.   Eyes: Negative for visual disturbance.   Respiratory: Negative for chest tightness and shortness of breath.    Cardiovascular: Negative for chest pain and palpitations.   Skin: Negative for color change.   Neurological: Positive for weakness. Negative for syncope and speech difficulty.   Psychiatric/Behavioral: Negative for confusion and decreased concentration.       Objective   Physical Exam   Constitutional: He is oriented to person, place, and time. He appears well-developed and well-nourished.   HENT:   Mouth/Throat: Oropharynx is clear and moist.   Eyes: EOM are normal. Pupils are equal, round, and reactive to light.   Neck: Normal range of motion.   Cardiovascular: Normal rate and regular rhythm.    Pulmonary/Chest: Effort normal and breath sounds normal. He has no rales.   Abdominal: Soft. Bowel sounds are normal.   Neurological: He is alert and oriented to person, place, and time.   Psychiatric: He has a normal mood and affect. His behavior is normal.   Nursing note and vitals reviewed.      Assessment/Plan   Semaj was seen today for pneumonia.    Diagnoses and all orders for this visit:    Pneumonia of left lung due to infectious organism, unspecified part of lung  -     Comprehensive metabolic panel; Future  -     Lipid panel; Future  -     CBC and Differential; Future  -     TSH; Future  -     Hemoglobin A1c; Future    Mixed hyperlipidemia  -     Comprehensive metabolic  panel; Future  -     Lipid panel; Future  -     CBC and Differential; Future  -     TSH; Future  -     Hemoglobin A1c; Future    Essential hypertension  -     Comprehensive metabolic panel; Future  -     Lipid panel; Future  -     CBC and Differential; Future  -     TSH; Future  -     Hemoglobin A1c; Future    Chronic atrial fibrillation  -     Comprehensive metabolic panel; Future  -     Lipid panel; Future  -     CBC and Differential; Future  -     TSH; Future  -     Hemoglobin A1c; Future    Hyperglycemia  -     Comprehensive metabolic panel; Future  -     Lipid panel; Future  -     CBC and Differential; Future  -     TSH; Future  -     Hemoglobin A1c; Future    Other orders  -     Cancel: XR Chest PA & Lateral  -     atorvastatin (LIPITOR) 20 MG tablet; Take 1 tablet by mouth Daily for 30 days.  -     metoprolol tartrate (LOPRESSOR) 25 MG tablet; Take 0.5 tablets by mouth Every 12 (Twelve) Hours for 30 days.

## 2017-01-23 NOTE — PATIENT INSTRUCTIONS
Rest  Increase fluids   RTC prn or 1 week if not better   Take mucinex DM  Take Zyrtec or Claritin   OTC cough med prn      Ibuprofen for pain and fever control                                                        Tylenol(acetominophen) for pain and fever control    Saline nasal spray 2 sprays to each nostril 6 times daily  bs measure 2 times per week

## 2017-01-24 ENCOUNTER — TELEPHONE (OUTPATIENT)
Dept: FAMILY MEDICINE CLINIC | Facility: CLINIC | Age: 57
End: 2017-01-24

## 2017-01-24 RX ORDER — ATORVASTATIN CALCIUM 40 MG/1
40 TABLET, FILM COATED ORAL DAILY
Qty: 30 TABLET | Refills: 5
Start: 2017-01-24 | End: 2017-01-24 | Stop reason: SDUPTHER

## 2017-01-24 RX ORDER — ATORVASTATIN CALCIUM 20 MG/1
20 TABLET, FILM COATED ORAL DAILY
Qty: 30 TABLET | Refills: 5
Start: 2017-01-24 | End: 2017-02-23

## 2017-01-26 RX ORDER — AMOXICILLIN AND CLAVULANATE POTASSIUM 875; 125 MG/1; MG/1
TABLET, FILM COATED ORAL
Qty: 20 TABLET | OUTPATIENT
Start: 2017-01-26

## 2017-04-03 DIAGNOSIS — Z12.5 SCREENING FOR PROSTATE CANCER: Primary | ICD-10-CM

## 2017-04-23 ENCOUNTER — RESULTS ENCOUNTER (OUTPATIENT)
Dept: FAMILY MEDICINE CLINIC | Facility: CLINIC | Age: 57
End: 2017-04-23

## 2017-04-23 DIAGNOSIS — R73.9 HYPERGLYCEMIA: ICD-10-CM

## 2017-04-23 DIAGNOSIS — J18.9 PNEUMONIA OF LEFT LUNG DUE TO INFECTIOUS ORGANISM, UNSPECIFIED PART OF LUNG: ICD-10-CM

## 2017-04-23 DIAGNOSIS — I10 ESSENTIAL HYPERTENSION: ICD-10-CM

## 2017-04-23 DIAGNOSIS — Z12.5 SCREENING FOR PROSTATE CANCER: ICD-10-CM

## 2017-04-23 DIAGNOSIS — I48.20 CHRONIC ATRIAL FIBRILLATION (HCC): ICD-10-CM

## 2017-04-23 DIAGNOSIS — E78.2 MIXED HYPERLIPIDEMIA: ICD-10-CM

## 2017-04-27 LAB
ALBUMIN SERPL-MCNC: 4.2 G/DL (ref 3.5–5.2)
ALBUMIN/GLOB SERPL: 1.6 G/DL
ALP SERPL-CCNC: 72 U/L (ref 39–117)
ALT SERPL-CCNC: 19 U/L (ref 1–41)
AST SERPL-CCNC: 19 U/L (ref 1–40)
BASOPHILS # BLD AUTO: 0.02 10*3/MM3 (ref 0–0.2)
BASOPHILS NFR BLD AUTO: 0.3 % (ref 0–1.5)
BILIRUB SERPL-MCNC: 0.5 MG/DL (ref 0.1–1.2)
BUN SERPL-MCNC: 22 MG/DL (ref 6–20)
BUN/CREAT SERPL: 20.2 (ref 7–25)
CALCIUM SERPL-MCNC: 9.1 MG/DL (ref 8.6–10.5)
CHLORIDE SERPL-SCNC: 101 MMOL/L (ref 98–107)
CHOLEST SERPL-MCNC: 198 MG/DL (ref 0–200)
CO2 SERPL-SCNC: 25 MMOL/L (ref 22–29)
CREAT SERPL-MCNC: 1.09 MG/DL (ref 0.76–1.27)
EOSINOPHIL # BLD AUTO: 0.34 10*3/MM3 (ref 0–0.7)
EOSINOPHIL NFR BLD AUTO: 4.8 % (ref 0.3–6.2)
ERYTHROCYTE [DISTWIDTH] IN BLOOD BY AUTOMATED COUNT: 13 % (ref 11.5–14.5)
GLOBULIN SER CALC-MCNC: 2.6 GM/DL
GLUCOSE SERPL-MCNC: 111 MG/DL (ref 65–99)
HBA1C MFR BLD: 5.6 % (ref 4.8–5.6)
HCT VFR BLD AUTO: 47.8 % (ref 40.4–52.2)
HDLC SERPL-MCNC: 42 MG/DL (ref 40–60)
HGB BLD-MCNC: 16.1 G/DL (ref 13.7–17.6)
IMM GRANULOCYTES # BLD: 0.02 10*3/MM3 (ref 0–0.03)
IMM GRANULOCYTES NFR BLD: 0.3 % (ref 0–0.5)
LDLC SERPL CALC-MCNC: 143 MG/DL (ref 0–100)
LYMPHOCYTES # BLD AUTO: 1.46 10*3/MM3 (ref 0.9–4.8)
LYMPHOCYTES NFR BLD AUTO: 20.5 % (ref 19.6–45.3)
MCH RBC QN AUTO: 32.4 PG (ref 27–32.7)
MCHC RBC AUTO-ENTMCNC: 33.7 G/DL (ref 32.6–36.4)
MCV RBC AUTO: 96.2 FL (ref 79.8–96.2)
MONOCYTES # BLD AUTO: 0.66 10*3/MM3 (ref 0.2–1.2)
MONOCYTES NFR BLD AUTO: 9.3 % (ref 5–12)
NEUTROPHILS # BLD AUTO: 4.63 10*3/MM3 (ref 1.9–8.1)
NEUTROPHILS NFR BLD AUTO: 64.8 % (ref 42.7–76)
PLATELET # BLD AUTO: 289 10*3/MM3 (ref 140–500)
POTASSIUM SERPL-SCNC: 4.8 MMOL/L (ref 3.5–5.2)
PROT SERPL-MCNC: 6.8 G/DL (ref 6–8.5)
PSA SERPL-MCNC: 0.33 NG/ML (ref 0–4)
RBC # BLD AUTO: 4.97 10*6/MM3 (ref 4.6–6)
SODIUM SERPL-SCNC: 141 MMOL/L (ref 136–145)
TRIGL SERPL-MCNC: 64 MG/DL (ref 0–150)
TSH SERPL DL<=0.005 MIU/L-ACNC: 1.32 MIU/ML (ref 0.27–4.2)
VLDLC SERPL CALC-MCNC: 12.8 MG/DL (ref 5–40)
WBC # BLD AUTO: 7.13 10*3/MM3 (ref 4.5–10.7)

## 2017-06-30 ENCOUNTER — OFFICE VISIT (OUTPATIENT)
Dept: FAMILY MEDICINE CLINIC | Facility: CLINIC | Age: 57
End: 2017-06-30

## 2017-06-30 VITALS
TEMPERATURE: 98.4 F | HEART RATE: 83 BPM | BODY MASS INDEX: 48.14 KG/M2 | DIASTOLIC BLOOD PRESSURE: 90 MMHG | SYSTOLIC BLOOD PRESSURE: 120 MMHG | HEIGHT: 64 IN | WEIGHT: 282 LBS | OXYGEN SATURATION: 95 % | RESPIRATION RATE: 16 BRPM

## 2017-06-30 DIAGNOSIS — K21.9 GASTROESOPHAGEAL REFLUX DISEASE, ESOPHAGITIS PRESENCE NOT SPECIFIED: ICD-10-CM

## 2017-06-30 DIAGNOSIS — I10 ESSENTIAL HYPERTENSION: ICD-10-CM

## 2017-06-30 DIAGNOSIS — J40 BRONCHITIS: Primary | ICD-10-CM

## 2017-06-30 DIAGNOSIS — E78.2 MIXED HYPERLIPIDEMIA: ICD-10-CM

## 2017-06-30 PROCEDURE — 99214 OFFICE O/P EST MOD 30 MIN: CPT | Performed by: FAMILY MEDICINE

## 2017-06-30 RX ORDER — FLUTICASONE PROPIONATE 50 MCG
SPRAY, SUSPENSION (ML) NASAL
Refills: 4 | COMMUNITY
Start: 2017-06-24 | End: 2018-09-19 | Stop reason: SDUPTHER

## 2017-06-30 RX ORDER — GUAIFENESIN 600 MG/1
1200 TABLET, EXTENDED RELEASE ORAL 2 TIMES DAILY
COMMUNITY
End: 2020-04-14

## 2017-06-30 RX ORDER — BENZONATATE 200 MG/1
200 CAPSULE ORAL 3 TIMES DAILY PRN
Qty: 30 CAPSULE | Refills: 0 | Status: SHIPPED | OUTPATIENT
Start: 2017-06-30 | End: 2017-08-09

## 2017-06-30 RX ORDER — CHLORAL HYDRATE 500 MG
1000 CAPSULE ORAL
COMMUNITY

## 2017-06-30 RX ORDER — LEVOFLOXACIN 500 MG/1
500 TABLET, FILM COATED ORAL DAILY
Qty: 10 TABLET | Refills: 0 | Status: SHIPPED | OUTPATIENT
Start: 2017-06-30 | End: 2017-07-10

## 2017-06-30 NOTE — PROGRESS NOTES
"Subjective   Semaj Narvaez is a 56 y.o. male.     History of Present Illness   Chief Complaint:   Chief Complaint   Patient presents with   • Cough     For 3 weeks   • Sinusitis   • Fatigue       Semaj Narvaez 56 y.o. male who presents today for Medical Management of the below listed issues and medication refills. For uri and to go over labs,  Uri 3 weeks    Cough    he has a history of   Patient Active Problem List   Diagnosis   • Cellulitis and abscess   • GERD (gastroesophageal reflux disease)   • Hyperlipidemia   • Hypertension   • DDD (degenerative disc disease), lumbosacral   • Low back pain   • Spider bite   • Pneumonia of left lung due to infectious organism   • Noncompliance   • Afib   • CKD (chronic kidney disease) stage 3, GFR 30-59 ml/min   .  Since the last visit, he has overall felt well.  he has been compliant with   Current Outpatient Prescriptions:   •  aspirin  MG EC tablet, Take 1 tablet by mouth Daily., Disp: , Rfl: 0  •  fluticasone (FLONASE) 50 MCG/ACT nasal spray, , Disp: , Rfl: 4  •  Garlic 10 MG capsule, Take  by mouth., Disp: , Rfl:   •  guaiFENesin (MUCINEX) 600 MG 12 hr tablet, Take 1,200 mg by mouth 2 (Two) Times a Day., Disp: , Rfl:   •  metoprolol tartrate (LOPRESSOR) 25 MG tablet, Take 15 mg by mouth 2 (Two) Times a Day., Disp: , Rfl: 4  •  Omega-3 Fatty Acids (FISH OIL) 1000 MG capsule capsule, Take 1,000 mg by mouth Daily With Breakfast., Disp: , Rfl:   •  omeprazole OTC (PriLOSEC OTC) 20 MG EC tablet, Take 20 mg by mouth As Needed., Disp: , Rfl: .  he denies medication side effects.    All of the chronic condition(s) listed above are stable w/o issues.    /90 (BP Location: Left arm, Patient Position: Sitting, Cuff Size: Large Adult)  Pulse 83  Temp 98.4 °F (36.9 °C) (Oral)   Resp 16  Ht 64\" (162.6 cm)  Wt 282 lb (128 kg)  SpO2 95%  BMI 48.41 kg/m2    Results for orders placed or performed in visit on 04/23/17   Comprehensive metabolic panel   Result Value Ref Range    " Glucose 111 (H) 65 - 99 mg/dL    BUN 22 (H) 6 - 20 mg/dL    Creatinine 1.09 0.76 - 1.27 mg/dL    eGFR Non African Am 69 >60 mL/min/1.73    eGFR African Am 84 >60 mL/min/1.73    BUN/Creatinine Ratio 20.2 7.0 - 25.0    Sodium 141 136 - 145 mmol/L    Potassium 4.8 3.5 - 5.2 mmol/L    Chloride 101 98 - 107 mmol/L    Total CO2 25.0 22.0 - 29.0 mmol/L    Calcium 9.1 8.6 - 10.5 mg/dL    Total Protein 6.8 6.0 - 8.5 g/dL    Albumin 4.20 3.50 - 5.20 g/dL    Globulin 2.6 gm/dL    A/G Ratio 1.6 g/dL    Total Bilirubin 0.5 0.1 - 1.2 mg/dL    Alkaline Phosphatase 72 39 - 117 U/L    AST (SGOT) 19 1 - 40 U/L    ALT (SGPT) 19 1 - 41 U/L   Lipid panel   Result Value Ref Range    Total Cholesterol 198 0 - 200 mg/dL    Triglycerides 64 0 - 150 mg/dL    HDL Cholesterol 42 40 - 60 mg/dL    VLDL Cholesterol 12.8 5 - 40 mg/dL    LDL Cholesterol  143 (H) 0 - 100 mg/dL   TSH   Result Value Ref Range    TSH 1.320 0.270 - 4.200 mIU/mL   Hemoglobin A1c   Result Value Ref Range    Hemoglobin A1C 5.60 4.80 - 5.60 %   PSA   Result Value Ref Range    PSA 0.330 0.000 - 4.000 ng/mL   CBC and Differential   Result Value Ref Range    WBC 7.13 4.50 - 10.70 10*3/mm3    RBC 4.97 4.60 - 6.00 10*6/mm3    Hemoglobin 16.1 13.7 - 17.6 g/dL    Hematocrit 47.8 40.4 - 52.2 %    MCV 96.2 79.8 - 96.2 fL    MCH 32.4 27.0 - 32.7 pg    MCHC 33.7 32.6 - 36.4 g/dL    RDW 13.0 11.5 - 14.5 %    Platelets 289 140 - 500 10*3/mm3    Neutrophil Rel % 64.8 42.7 - 76.0 %    Lymphocyte Rel % 20.5 19.6 - 45.3 %    Monocyte Rel % 9.3 5.0 - 12.0 %    Eosinophil Rel % 4.8 0.3 - 6.2 %    Basophil Rel % 0.3 0.0 - 1.5 %    Neutrophils Absolute 4.63 1.90 - 8.10 10*3/mm3    Lymphocytes Absolute 1.46 0.90 - 4.80 10*3/mm3    Monocytes Absolute 0.66 0.20 - 1.20 10*3/mm3    Eosinophils Absolute 0.34 0.00 - 0.70 10*3/mm3    Basophils Absolute 0.02 0.00 - 0.20 10*3/mm3    Immature Granulocyte Rel % 0.3 0.0 - 0.5 %    Immature Grans Absolute 0.02 0.00 - 0.03 10*3/mm3       The following  portions of the patient's history were reviewed and updated as appropriate: allergies, current medications, past family history, past medical history, past social history, past surgical history and problem list.    Review of Systems   Constitutional: Negative for activity change, chills, fatigue and fever.   HENT: Positive for postnasal drip and sinus pressure. Negative for ear pain and sore throat.    Eyes: Negative for discharge.   Respiratory: Positive for cough and choking. Negative for chest tightness and wheezing.    Gastrointestinal: Negative for abdominal distention.   Genitourinary: Negative for difficulty urinating and frequency.   Musculoskeletal: Positive for myalgias. Negative for arthralgias.   Neurological: Negative for dizziness and numbness.   Psychiatric/Behavioral: Negative for agitation and behavioral problems.       Objective   Physical Exam   Constitutional: He is oriented to person, place, and time. He appears well-developed and well-nourished.   HENT:   Head: Normocephalic and atraumatic.   Right Ear: External ear normal.   Left Ear: External ear normal.   Eyes: EOM are normal. Pupils are equal, round, and reactive to light.   Neck: Normal range of motion. Neck supple. No thyromegaly present.   Cardiovascular: Normal rate and regular rhythm.    Pulmonary/Chest: Effort normal and breath sounds normal. He has no wheezes. He has no rales.   Abdominal: Soft. He exhibits no distension. There is no tenderness.   Musculoskeletal: Normal range of motion.   Lymphadenopathy:     He has no cervical adenopathy.   Neurological: He is alert and oriented to person, place, and time.   Skin: Skin is warm and dry.   Psychiatric: His behavior is normal.   Nursing note and vitals reviewed.      Assessment/Plan   Semaj was seen today for cough, sinusitis and fatigue.    Diagnoses and all orders for this visit:    Bronchitis    Gastroesophageal reflux disease, esophagitis presence not specified    Mixed  hyperlipidemia    Essential hypertension    Other orders  -     levoFLOXacin (LEVAQUIN) 500 MG tablet; Take 1 tablet by mouth Daily for 10 days. For Respiratory infection  -     benzonatate (TESSALON) 200 MG capsule; Take 1 capsule by mouth 3 (Three) Times a Day As Needed for Cough.

## 2017-06-30 NOTE — PATIENT INSTRUCTIONS
Patient advised to stop smoking  Rest  Increase fluids   RTC prn or 1 week if not better   Take mucinex DM  Take Zyrtec or Claritin   OTC cough med prn      Ibuprofen for pain and fever control                                                        Tylenol(acetominophen) for pain and fever control    Saline nasal spray 2 sprays to each nostril 6 times daily  See me if cough remains

## 2017-07-12 RX ORDER — LEVOFLOXACIN 500 MG/1
500 TABLET, FILM COATED ORAL DAILY
Qty: 10 TABLET | Refills: 0 | Status: SHIPPED | OUTPATIENT
Start: 2017-07-12 | End: 2017-08-09

## 2017-08-09 ENCOUNTER — OFFICE VISIT (OUTPATIENT)
Dept: FAMILY MEDICINE CLINIC | Facility: CLINIC | Age: 57
End: 2017-08-09

## 2017-08-09 VITALS
TEMPERATURE: 98 F | DIASTOLIC BLOOD PRESSURE: 84 MMHG | WEIGHT: 287 LBS | RESPIRATION RATE: 16 BRPM | HEIGHT: 64 IN | OXYGEN SATURATION: 95 % | SYSTOLIC BLOOD PRESSURE: 150 MMHG | HEART RATE: 82 BPM | BODY MASS INDEX: 49 KG/M2

## 2017-08-09 DIAGNOSIS — I10 ESSENTIAL HYPERTENSION: Primary | ICD-10-CM

## 2017-08-09 DIAGNOSIS — E66.9 OBESITY, UNSPECIFIED OBESITY SEVERITY, UNSPECIFIED OBESITY TYPE: ICD-10-CM

## 2017-08-09 DIAGNOSIS — I48.20 CHRONIC ATRIAL FIBRILLATION (HCC): ICD-10-CM

## 2017-08-09 PROCEDURE — 99214 OFFICE O/P EST MOD 30 MIN: CPT | Performed by: FAMILY MEDICINE

## 2017-08-09 NOTE — PATIENT INSTRUCTIONS
Exercise 30 minutes most days of the week  Sleep 6-8 hours each night if possible  Low fat, low cholesterol diet   we discussed prescribed medications and how to take them   make sure you get results of any labs/studies ordered today  Low glycemic index diet     Try to loose weight

## 2017-08-09 NOTE — PROGRESS NOTES
"Subjective   Semaj Narvaez is a 56 y.o. male.     History of Present Illness   Chief Complaint:   Chief Complaint   Patient presents with   • Hypertension   • Atrial Fibrillation       Semaj Narvaez 56 y.o. male who presents today for Medical Management of the below listed issues and medication refills. Reviewed meds and labs and records. He had a fib and was seen by cardiologist and refused workup. he has a history of   Patient Active Problem List   Diagnosis   • Cellulitis and abscess   • GERD (gastroesophageal reflux disease)   • Hyperlipidemia   • Hypertension   • DDD (degenerative disc disease), lumbosacral   • Low back pain   • Spider bite   • Pneumonia of left lung due to infectious organism   • Noncompliance   • Afib   • CKD (chronic kidney disease) stage 3, GFR 30-59 ml/min   .  Since the last visit, he has overall felt well.  he has been compliant with   Current Outpatient Prescriptions:   •  aspirin  MG EC tablet, Take 1 tablet by mouth Daily., Disp: , Rfl: 0  •  fluticasone (FLONASE) 50 MCG/ACT nasal spray, , Disp: , Rfl: 4  •  Garlic 10 MG capsule, Take  by mouth., Disp: , Rfl:   •  guaiFENesin (MUCINEX) 600 MG 12 hr tablet, Take 1,200 mg by mouth 2 (Two) Times a Day., Disp: , Rfl:   •  metoprolol tartrate (LOPRESSOR) 25 MG tablet, Take 0.5 tablets by mouth 2 (Two) Times a Day., Disp: 30 tablet, Rfl: 0  •  Omega-3 Fatty Acids (FISH OIL) 1000 MG capsule capsule, Take 1,000 mg by mouth Daily With Breakfast., Disp: , Rfl:   •  omeprazole OTC (PriLOSEC OTC) 20 MG EC tablet, Take 20 mg by mouth As Needed., Disp: , Rfl: .  he denies medication side effects.    All of the chronic condition(s) listed above are stable w/o issues.    /92 HR 82 Resp 16  Ht 64\" (162.6 cm)    The following portions of the patient's history were reviewed and updated as appropriate: allergies, current medications, past family history, past medical history, past social history, past surgical history and problem list.    Review " of Systems   Constitutional: Negative for activity change, appetite change and unexpected weight change.   Eyes: Negative for visual disturbance.   Respiratory: Negative for chest tightness and shortness of breath.    Cardiovascular: Negative for chest pain and palpitations.   Skin: Negative for color change.   Neurological: Negative for syncope and speech difficulty.   Psychiatric/Behavioral: Negative for confusion and decreased concentration.       Objective   Physical Exam   Constitutional: He is oriented to person, place, and time. He appears well-developed and well-nourished.   HENT:   Head: Normocephalic.   Mouth/Throat: Oropharynx is clear and moist.   Eyes: Pupils are equal, round, and reactive to light.   Neck: Normal range of motion. Neck supple.   Cardiovascular: Normal rate and regular rhythm.    Pulmonary/Chest: Effort normal and breath sounds normal. No respiratory distress. He has no wheezes. He has no rales.   Abdominal: Soft. Bowel sounds are normal.   Musculoskeletal: Normal range of motion.   Lymphadenopathy:     He has no cervical adenopathy.   Neurological: He is alert and oriented to person, place, and time.   Skin: Skin is warm and dry.   Psychiatric: He has a normal mood and affect. His behavior is normal.   Nursing note and vitals reviewed.      Assessment/Plan   Semaj was seen today for hypertension and atrial fibrillation.    Diagnoses and all orders for this visit:    Essential hypertension  -     metoprolol tartrate (LOPRESSOR) 25 MG tablet; Take 0.5 tablets by mouth 2 (Two) Times a Day.    Chronic atrial fibrillation    Obesity, unspecified obesity severity, unspecified obesity type

## 2018-03-09 ENCOUNTER — OFFICE VISIT (OUTPATIENT)
Dept: FAMILY MEDICINE CLINIC | Facility: CLINIC | Age: 58
End: 2018-03-09

## 2018-03-09 VITALS
SYSTOLIC BLOOD PRESSURE: 142 MMHG | BODY MASS INDEX: 50.53 KG/M2 | DIASTOLIC BLOOD PRESSURE: 90 MMHG | OXYGEN SATURATION: 95 % | TEMPERATURE: 97.6 F | HEIGHT: 64 IN | WEIGHT: 296 LBS | RESPIRATION RATE: 16 BRPM | HEART RATE: 84 BPM

## 2018-03-09 DIAGNOSIS — I10 ESSENTIAL HYPERTENSION: ICD-10-CM

## 2018-03-09 DIAGNOSIS — IMO0001 CLASS 3 OBESITY WITH SERIOUS COMORBIDITY AND BODY MASS INDEX (BMI) OF 50.0 TO 59.9 IN ADULT, UNSPECIFIED OBESITY TYPE: Primary | ICD-10-CM

## 2018-03-09 PROCEDURE — 99214 OFFICE O/P EST MOD 30 MIN: CPT | Performed by: FAMILY MEDICINE

## 2018-03-09 NOTE — PROGRESS NOTES
"Subjective   Semaj Narvaez is a 57 y.o. male.     History of Present Illness   Chief Complaint:   Chief Complaint   Patient presents with   • Hypertension   • Obesity       Semaj Narvaez 57 y.o. male who presents today for Medical Management of the below listed issues.  he has a problem list of   Patient Active Problem List   Diagnosis   • Cellulitis and abscess   • GERD (gastroesophageal reflux disease)   • Hyperlipidemia   • Hypertension   • DDD (degenerative disc disease), lumbosacral   • Low back pain   • Spider bite   • Pneumonia of left lung due to infectious organism   • Noncompliance   • Afib   • CKD (chronic kidney disease) stage 3, GFR 30-59 ml/min   .  Since the last visit, he has overall felt well.  he has been compliant with   Current Outpatient Prescriptions:   •  aspirin  MG EC tablet, Take 1 tablet by mouth Daily., Disp: , Rfl: 0  •  fluticasone (FLONASE) 50 MCG/ACT nasal spray, , Disp: , Rfl: 4  •  Garlic 10 MG capsule, Take  by mouth., Disp: , Rfl:   •  guaiFENesin (MUCINEX) 600 MG 12 hr tablet, Take 1,200 mg by mouth 2 (Two) Times a Day., Disp: , Rfl:   •  metoprolol tartrate (LOPRESSOR) 25 MG tablet, Take 0.5 tablets by mouth 2 (Two) Times a Day., Disp: 90 tablet, Rfl: 1  •  Omega-3 Fatty Acids (FISH OIL) 1000 MG capsule capsule, Take 1,000 mg by mouth Daily With Breakfast., Disp: , Rfl:   •  omeprazole OTC (PriLOSEC OTC) 20 MG EC tablet, Take 20 mg by mouth As Needed., Disp: , Rfl: .  he denies medication side effects.    All of the chronic condition(s) listed above are stable w/o issues.    /93  Pulse 84  Temp 97.6 °F (36.4 °C) (Oral)   Resp 16  Ht 162.6 cm (64\")  Wt 134 kg (296 lb)  SpO2 95%  BMI 50.81 kg/m2      The following portions of the patient's history were reviewed and updated as appropriate: allergies, current medications, past family history, past medical history, past social history, past surgical history and problem list.    Review of Systems   Constitutional: " Negative for activity change, appetite change, chills, fatigue, fever and unexpected weight change.   HENT: Negative for congestion, ear pain, hearing loss, mouth sores, nosebleeds, rhinorrhea and sore throat.    Eyes: Negative for pain and visual disturbance.   Respiratory: Negative for cough, shortness of breath and wheezing.    Cardiovascular: Negative for chest pain, palpitations and leg swelling.   Gastrointestinal: Negative for abdominal distention, abdominal pain, blood in stool, constipation, diarrhea, nausea and vomiting.   Endocrine: Negative for cold intolerance and heat intolerance.   Genitourinary: Negative for difficulty urinating, discharge, dysuria, frequency, hematuria and urgency.   Musculoskeletal: Negative for back pain and joint swelling.   Skin: Negative for rash and wound.   Neurological: Negative for dizziness, weakness, numbness and headaches.   Hematological: Does not bruise/bleed easily.   Psychiatric/Behavioral: Negative for confusion, dysphoric mood, sleep disturbance and suicidal ideas. The patient is not nervous/anxious.        Objective   Physical Exam   Constitutional: He is oriented to person, place, and time. He appears well-developed and well-nourished.   HENT:   Head: Normocephalic.   Mouth/Throat: Oropharynx is clear and moist.   Eyes: Pupils are equal, round, and reactive to light.   Neck: Normal range of motion. Neck supple.   Cardiovascular: Normal rate and regular rhythm.    Pulmonary/Chest: Effort normal and breath sounds normal.   Abdominal: Soft. Bowel sounds are normal.   Musculoskeletal: Normal range of motion.   Lymphadenopathy:     He has no cervical adenopathy.   Neurological: He is alert and oriented to person, place, and time.   Skin: Skin is dry.   Psychiatric: He has a normal mood and affect. His behavior is normal.   Nursing note and vitals reviewed.      Assessment/Plan   Semaj was seen today for hypertension and obesity.    Diagnoses and all orders for this  visit:    Class 3 obesity with serious comorbidity and body mass index (BMI) of 50.0 to 59.9 in adult, unspecified obesity type  -     Comprehensive metabolic panel  -     Lipid panel  -     CBC and Differential  -     TSH  -     PSA  -     XR Chest PA & Lateral    Essential hypertension  -     Comprehensive metabolic panel  -     Lipid panel  -     CBC and Differential  -     TSH  -     PSA  -     XR Chest PA & Lateral  -     metoprolol tartrate (LOPRESSOR) 25 MG tablet; Take 0.5 tablets by mouth Every 12 (Twelve) Hours.

## 2018-03-10 ENCOUNTER — HOSPITAL ENCOUNTER (OUTPATIENT)
Dept: GENERAL RADIOLOGY | Facility: HOSPITAL | Age: 58
Discharge: HOME | End: 2018-03-10
Admitting: FAMILY MEDICINE

## 2018-03-10 PROCEDURE — 71046 X-RAY EXAM CHEST 2 VIEWS: CPT

## 2018-03-11 LAB
ALBUMIN SERPL-MCNC: 4.2 G/DL (ref 3.5–5.5)
ALBUMIN/GLOB SERPL: 1.8 {RATIO} (ref 1.2–2.2)
ALP SERPL-CCNC: 72 IU/L (ref 39–117)
ALT SERPL-CCNC: 17 IU/L (ref 0–44)
AST SERPL-CCNC: 19 IU/L (ref 0–40)
BASOPHILS # BLD AUTO: 0 X10E3/UL (ref 0–0.2)
BASOPHILS NFR BLD AUTO: 0 %
BILIRUB SERPL-MCNC: 0.3 MG/DL (ref 0–1.2)
BUN SERPL-MCNC: 18 MG/DL (ref 6–24)
BUN/CREAT SERPL: 15 (ref 9–20)
CALCIUM SERPL-MCNC: 9.1 MG/DL (ref 8.7–10.2)
CHLORIDE SERPL-SCNC: 101 MMOL/L (ref 96–106)
CHOLEST SERPL-MCNC: 197 MG/DL (ref 100–199)
CO2 SERPL-SCNC: 26 MMOL/L (ref 18–29)
CREAT SERPL-MCNC: 1.2 MG/DL (ref 0.76–1.27)
EOSINOPHIL # BLD AUTO: 0.3 X10E3/UL (ref 0–0.4)
EOSINOPHIL NFR BLD AUTO: 3 %
ERYTHROCYTE [DISTWIDTH] IN BLOOD BY AUTOMATED COUNT: 13.8 % (ref 12.3–15.4)
GFR SERPLBLD CREATININE-BSD FMLA CKD-EPI: 66 ML/MIN/1.73
GFR SERPLBLD CREATININE-BSD FMLA CKD-EPI: 76 ML/MIN/1.73
GLOBULIN SER CALC-MCNC: 2.4 G/DL (ref 1.5–4.5)
GLUCOSE SERPL-MCNC: 112 MG/DL (ref 65–99)
HCT VFR BLD AUTO: 47.2 % (ref 37.5–51)
HDLC SERPL-MCNC: 41 MG/DL
HGB BLD-MCNC: 16.3 G/DL (ref 13–17.7)
IMM GRANULOCYTES # BLD: 0 X10E3/UL (ref 0–0.1)
IMM GRANULOCYTES NFR BLD: 0 %
LDLC SERPL CALC-MCNC: 130 MG/DL (ref 0–99)
LYMPHOCYTES # BLD AUTO: 2 X10E3/UL (ref 0.7–3.1)
LYMPHOCYTES NFR BLD AUTO: 24 %
MCH RBC QN AUTO: 32 PG (ref 26.6–33)
MCHC RBC AUTO-ENTMCNC: 34.5 G/DL (ref 31.5–35.7)
MCV RBC AUTO: 93 FL (ref 79–97)
MONOCYTES # BLD AUTO: 0.8 X10E3/UL (ref 0.1–0.9)
MONOCYTES NFR BLD AUTO: 9 %
NEUTROPHILS # BLD AUTO: 5.3 X10E3/UL (ref 1.4–7)
NEUTROPHILS NFR BLD AUTO: 64 %
PLATELET # BLD AUTO: 304 X10E3/UL (ref 150–379)
POTASSIUM SERPL-SCNC: 5.1 MMOL/L (ref 3.5–5.2)
PROT SERPL-MCNC: 6.6 G/DL (ref 6–8.5)
PSA SERPL-MCNC: 0.4 NG/ML (ref 0–4)
RBC # BLD AUTO: 5.1 X10E6/UL (ref 4.14–5.8)
SODIUM SERPL-SCNC: 143 MMOL/L (ref 134–144)
TRIGL SERPL-MCNC: 128 MG/DL (ref 0–149)
TSH SERPL DL<=0.005 MIU/L-ACNC: 1.36 UIU/ML (ref 0.45–4.5)
VLDLC SERPL CALC-MCNC: 26 MG/DL (ref 5–40)
WBC # BLD AUTO: 8.3 X10E3/UL (ref 3.4–10.8)

## 2018-03-23 ENCOUNTER — OFFICE VISIT (OUTPATIENT)
Dept: FAMILY MEDICINE CLINIC | Facility: CLINIC | Age: 58
End: 2018-03-23

## 2018-03-23 VITALS
TEMPERATURE: 98.3 F | OXYGEN SATURATION: 98 % | SYSTOLIC BLOOD PRESSURE: 146 MMHG | DIASTOLIC BLOOD PRESSURE: 86 MMHG | RESPIRATION RATE: 16 BRPM | WEIGHT: 297 LBS | BODY MASS INDEX: 50.71 KG/M2 | HEIGHT: 64 IN | HEART RATE: 77 BPM

## 2018-03-23 DIAGNOSIS — E78.2 MIXED HYPERLIPIDEMIA: ICD-10-CM

## 2018-03-23 DIAGNOSIS — I48.20 CHRONIC ATRIAL FIBRILLATION (HCC): ICD-10-CM

## 2018-03-23 DIAGNOSIS — I10 ESSENTIAL HYPERTENSION: ICD-10-CM

## 2018-03-23 DIAGNOSIS — Z00.00 ANNUAL PHYSICAL EXAM: ICD-10-CM

## 2018-03-23 DIAGNOSIS — R73.9 HYPERGLYCEMIA: ICD-10-CM

## 2018-03-23 DIAGNOSIS — IMO0001 CLASS 3 OBESITY DUE TO EXCESS CALORIES WITH BODY MASS INDEX (BMI) OF 50.0 TO 59.9 IN ADULT, UNSPECIFIED WHETHER SERIOUS COMORBIDITY PRESENT: ICD-10-CM

## 2018-03-23 DIAGNOSIS — I48.91 LONE ATRIAL FIBRILLATION (HCC): Primary | ICD-10-CM

## 2018-03-23 PROCEDURE — 99396 PREV VISIT EST AGE 40-64: CPT | Performed by: FAMILY MEDICINE

## 2018-03-23 NOTE — PROGRESS NOTES
"Subjective   Semaj Narvaez is a 57 y.o. male.     History of Present Illness   Chief Complaint:   Chief Complaint   Patient presents with   • Annual Exam       Semaj Narvaez 57 y.o. male who presents today for his annual physical exam with Medical Management of the below listed issues. I reviewed his lab results. He had an EKG in the office today. He had a CXR on 03/09/2018.  he has a problem list of  ekg showed a fib  Same as last year  But next ekg nsr..cxr normal   Refer to dr leon for a fib  Patient Active Problem List   Diagnosis   • Cellulitis and abscess   • GERD (gastroesophageal reflux disease)   • Hyperlipidemia   • Hypertension   • DDD (degenerative disc disease), lumbosacral   • Low back pain   • Spider bite   • Pneumonia of left lung due to infectious organism   • Noncompliance   • Afib   • CKD (chronic kidney disease) stage 3, GFR 30-59 ml/min   .  Since the last visit, he has overall felt well.  he has been compliant with   Current Outpatient Prescriptions:   •  aspirin  MG EC tablet, Take 1 tablet by mouth Daily., Disp: , Rfl: 0  •  fluticasone (FLONASE) 50 MCG/ACT nasal spray, , Disp: , Rfl: 4  •  Garlic 10 MG capsule, Take  by mouth., Disp: , Rfl:   •  guaiFENesin (MUCINEX) 600 MG 12 hr tablet, Take 1,200 mg by mouth 2 (Two) Times a Day., Disp: , Rfl:   •  metoprolol tartrate (LOPRESSOR) 25 MG tablet, Take 0.5 tablets by mouth Every 12 (Twelve) Hours., Disp: 90 tablet, Rfl: 1  •  Omega-3 Fatty Acids (FISH OIL) 1000 MG capsule capsule, Take 1,000 mg by mouth Daily With Breakfast., Disp: , Rfl:   •  omeprazole OTC (PriLOSEC OTC) 20 MG EC tablet, Take 20 mg by mouth As Needed., Disp: , Rfl: .  he denies medication side effects.    All of the chronic condition(s) listed above are stable w/o issues.    /86   Pulse 77   Temp 98.3 °F (36.8 °C) (Oral)   Resp 16   Ht 162.6 cm (64\")   Wt 135 kg (297 lb)   SpO2 98%   BMI 50.98 kg/m²     Results for orders placed or performed in visit on " 03/09/18   Comprehensive metabolic panel   Result Value Ref Range    Glucose 112 (H) 65 - 99 mg/dL    BUN 18 6 - 24 mg/dL    Creatinine 1.20 0.76 - 1.27 mg/dL    eGFR Non African Am 66 >59 mL/min/1.73    eGFR African Am 76 >59 mL/min/1.73    BUN/Creatinine Ratio 15 9 - 20    Sodium 143 134 - 144 mmol/L    Potassium 5.1 3.5 - 5.2 mmol/L    Chloride 101 96 - 106 mmol/L    Total CO2 26 18 - 29 mmol/L    Calcium 9.1 8.7 - 10.2 mg/dL    Total Protein 6.6 6.0 - 8.5 g/dL    Albumin 4.2 3.5 - 5.5 g/dL    Globulin 2.4 1.5 - 4.5 g/dL    A/G Ratio 1.8 1.2 - 2.2    Total Bilirubin 0.3 0.0 - 1.2 mg/dL    Alkaline Phosphatase 72 39 - 117 IU/L    AST (SGOT) 19 0 - 40 IU/L    ALT (SGPT) 17 0 - 44 IU/L   Lipid panel   Result Value Ref Range    Total Cholesterol 197 100 - 199 mg/dL    Triglycerides 128 0 - 149 mg/dL    HDL Cholesterol 41 >39 mg/dL    VLDL Cholesterol 26 5 - 40 mg/dL    LDL Cholesterol  130 (H) 0 - 99 mg/dL   TSH   Result Value Ref Range    TSH 1.360 0.450 - 4.500 uIU/mL   PSA   Result Value Ref Range    PSA 0.4 0.0 - 4.0 ng/mL   CBC and Differential   Result Value Ref Range    WBC 8.3 3.4 - 10.8 x10E3/uL    RBC 5.10 4.14 - 5.80 x10E6/uL    Hemoglobin 16.3 13.0 - 17.7 g/dL    Hematocrit 47.2 37.5 - 51.0 %    MCV 93 79 - 97 fL    MCH 32.0 26.6 - 33.0 pg    MCHC 34.5 31.5 - 35.7 g/dL    RDW 13.8 12.3 - 15.4 %    Platelets 304 150 - 379 x10E3/uL    Neutrophil Rel % 64 Not Estab. %    Lymphocyte Rel % 24 Not Estab. %    Monocyte Rel % 9 Not Estab. %    Eosinophil Rel % 3 Not Estab. %    Basophil Rel % 0 Not Estab. %    Neutrophils Absolute 5.3 1.4 - 7.0 x10E3/uL    Lymphocytes Absolute 2.0 0.7 - 3.1 x10E3/uL    Monocytes Absolute 0.8 0.1 - 0.9 x10E3/uL    Eosinophils Absolute 0.3 0.0 - 0.4 x10E3/uL    Basophils Absolute 0.0 0.0 - 0.2 x10E3/uL    Immature Granulocyte Rel % 0 Not Estab. %    Immature Grans Absolute 0.0 0.0 - 0.1 x10E3/uL           The following portions of the patient's history were reviewed and updated as  appropriate: allergies, current medications, past family history, past medical history, past social history, past surgical history and problem list.    Review of Systems   Constitutional: Negative for activity change, appetite change and unexpected weight change.   Eyes: Negative for visual disturbance.   Respiratory: Negative for chest tightness and shortness of breath.    Cardiovascular: Negative for chest pain and palpitations.   Skin: Negative for color change.   Neurological: Negative for tremors, syncope and speech difficulty.   Psychiatric/Behavioral: Negative for behavioral problems, confusion and decreased concentration.       Objective   Physical Exam   Constitutional: He is oriented to person, place, and time. He appears well-developed and well-nourished.   HENT:   Head: Normocephalic.   Mouth/Throat: Oropharynx is clear and moist. No oropharyngeal exudate.   Eyes: EOM are normal. Pupils are equal, round, and reactive to light.   Neck: Normal range of motion. Neck supple.   Cardiovascular: Normal rate, regular rhythm and normal heart sounds.    No murmur heard.  Pulmonary/Chest: Effort normal and breath sounds normal. He has no wheezes. He has no rales.   Abdominal: Soft. Bowel sounds are normal. He exhibits no distension. There is no tenderness.   Genitourinary:   Genitourinary Comments: Deferred ` prostate exam  He declined   Musculoskeletal: Normal range of motion. He exhibits no tenderness.   Neurological: He is alert and oriented to person, place, and time. He has normal reflexes.   Skin: Skin is warm and dry.   Nursing note and vitals reviewed.      Assessment/Plan   Semaj was seen today for annual exam.    Diagnoses and all orders for this visit:    Lone atrial fibrillation  -     Cancel: Ambulatory Referral to Cardiology  -     Ambulatory Referral to Cardiology    Annual physical exam  -     ECG 12 Lead  -     Cancel: Ambulatory Referral to Cardiology    Mixed hyperlipidemia    Essential  hypertension    Chronic atrial fibrillation    Hyperglycemia    Class 3 obesity due to excess calories with body mass index (BMI) of 50.0 to 59.9 in adult, unspecified whether serious comorbidity present    Other orders  -     Cancel: ECG 12 Lead

## 2018-03-23 NOTE — PROGRESS NOTES
Procedure     ECG 12 Lead  Date/Time: 3/23/2018 3:48 PM  Performed by: NIURKA GUEVARA  Authorized by: NIURKA GUEVARA   Comparison: compared with previous ECG from 1/11/2017  Comparison to previous ECG: A fib      On 1/12/2017    nsr  Rhythm: atrial fibrillation  Rate: normal  BPM: 77  Clinical impression: abnormal ECG  Comments: A fib with ns stt abn     He had a fib in past and was nsr   Refused xarelto  Referred to dr Raymond

## 2018-09-11 ENCOUNTER — TELEPHONE (OUTPATIENT)
Dept: FAMILY MEDICINE CLINIC | Facility: CLINIC | Age: 58
End: 2018-09-11

## 2018-09-11 DIAGNOSIS — I10 ESSENTIAL HYPERTENSION: ICD-10-CM

## 2018-09-19 ENCOUNTER — OFFICE VISIT (OUTPATIENT)
Dept: FAMILY MEDICINE CLINIC | Facility: CLINIC | Age: 58
End: 2018-09-19

## 2018-09-19 VITALS
OXYGEN SATURATION: 96 % | WEIGHT: 301 LBS | HEART RATE: 70 BPM | SYSTOLIC BLOOD PRESSURE: 169 MMHG | RESPIRATION RATE: 16 BRPM | BODY MASS INDEX: 51.39 KG/M2 | HEIGHT: 64 IN | TEMPERATURE: 98.6 F | DIASTOLIC BLOOD PRESSURE: 101 MMHG

## 2018-09-19 DIAGNOSIS — I10 ESSENTIAL HYPERTENSION: ICD-10-CM

## 2018-09-19 DIAGNOSIS — N18.30 CKD (CHRONIC KIDNEY DISEASE) STAGE 3, GFR 30-59 ML/MIN (HCC): ICD-10-CM

## 2018-09-19 DIAGNOSIS — IMO0001 CLASS 3 OBESITY DUE TO EXCESS CALORIES WITH SERIOUS COMORBIDITY AND BODY MASS INDEX (BMI) OF 50.0 TO 59.9 IN ADULT: ICD-10-CM

## 2018-09-19 DIAGNOSIS — Z86.14 HISTORY OF MRSA INFECTION: ICD-10-CM

## 2018-09-19 DIAGNOSIS — I48.20 CHRONIC ATRIAL FIBRILLATION (HCC): Primary | ICD-10-CM

## 2018-09-19 PROCEDURE — 99214 OFFICE O/P EST MOD 30 MIN: CPT | Performed by: FAMILY MEDICINE

## 2018-09-19 RX ORDER — SULFAMETHOXAZOLE AND TRIMETHOPRIM 800; 160 MG/1; MG/1
1 TABLET ORAL 2 TIMES DAILY
Qty: 20 TABLET | Refills: 0 | Status: SHIPPED | OUTPATIENT
Start: 2018-09-19 | End: 2018-09-20 | Stop reason: SDUPTHER

## 2018-09-19 RX ORDER — FLUTICASONE PROPIONATE 50 MCG
2 SPRAY, SUSPENSION (ML) NASAL DAILY
Qty: 1 G | Refills: 4 | Status: SHIPPED | OUTPATIENT
Start: 2018-09-19 | End: 2022-09-13

## 2018-09-19 NOTE — PROGRESS NOTES
Subjective   Chief Complaint:   Chief Complaint   Patient presents with   • Hypertension         History of Present Illness seen today for hypertension.  Pressure by the nurse was 169/101.  Patient is in atrial fib.  I had seen him in the past and he was to see Dr. MATTA  but he says he never saw Dr. MATTA.  Point him the possibility of stroke and he understood the bad side effects of atrial fib and chose not to see the cardiologist at present time but will later.  I have had this discussion with him before.  I got his blood pressure 148/86.  He is overweight.  Weight in the office was 301 pounds.  Her a BMI of 51.6.  He claims he is working so many hours that he cannot exercise.  He has no chest pain.  Medicines for the patient, aspirin 325 mg Flonase nasal spray garlic Mucinex, metoprolol tartrate or Lopressor 25 mg tablets one half twice a day.  Fish oil omeprazole over-the-counter and Bactrim when necessary outbreak of MRSA.  He definitely needs to see a dietitian and started on a weight reducing diet.  He takes an aspirin 325 mg daily and Bactrim double strength when necessary outbreaks of MRSA and metoprolol tartrate 25 mg a half a tablet by mouth twice a day .  Oral medications.  Pressure taken again was 148/84.  He was told to see the cardiologist about atrial fib.  Job is fairly hard and very strenuous.  He has no chest pain or shortness of breath.            Semaj Narvaez 57 y.o. male who presents today for Medical Management of the below listed issues and medication refills.    ICD-10-CM ICD-9-CM   1. Chronic atrial fibrillation (CMS/[facility]) I48.2 427.31   2. Essential hypertension I10 401.9   3. History of MRSA infection Z86.14 V12.04   4. CKD (chronic kidney disease) stage 3, GFR 30-59 ml/min N18.3 585.3   5. Class 3 obesity due to excess calories with serious comorbidity and body mass index (BMI) of 50.0 to 59.9 in adult (CMS/[facility]) E66.09 278.00    Z68.43 V85.43        he has a problem list of   Patient  "Active Problem List   Diagnosis   • Cellulitis and abscess   • GERD (gastroesophageal reflux disease)   • Hyperlipidemia   • Hypertension   • DDD (degenerative disc disease), lumbosacral   • Low back pain   • Spider bite   • Pneumonia of left lung due to infectious organism   • Noncompliance   • Afib (CMS/HCC)   • CKD (chronic kidney disease) stage 3, GFR 30-59 ml/min   .  Since the last visit, he has overall felt well.  he has been compliant with   Current Outpatient Prescriptions:   •  aspirin  MG EC tablet, Take 1 tablet by mouth Daily., Disp: , Rfl: 0  •  fluticasone (FLONASE) 50 MCG/ACT nasal spray, 2 sprays into the nostril(s) as directed by provider Daily., Disp: 1 g, Rfl: 4  •  Garlic 10 MG capsule, Take  by mouth., Disp: , Rfl:   •  guaiFENesin (MUCINEX) 600 MG 12 hr tablet, Take 1,200 mg by mouth 2 (Two) Times a Day., Disp: , Rfl:   •  metoprolol tartrate (LOPRESSOR) 25 MG tablet, Take 0.5 tablets by mouth Every 12 (Twelve) Hours., Disp: 90 tablet, Rfl: 1  •  Omega-3 Fatty Acids (FISH OIL) 1000 MG capsule capsule, Take 1,000 mg by mouth Daily With Breakfast., Disp: , Rfl:   •  omeprazole OTC (PriLOSEC OTC) 20 MG EC tablet, Take 20 mg by mouth As Needed., Disp: , Rfl:   •  sulfamethoxazole-trimethoprim (BACTRIM DS) 800-160 MG per tablet, Take 1 tablet by mouth 2 (Two) Times a Day., Disp: 20 tablet, Rfl: 0.  he denies medication side effects.    All of the chronic condition(s) listed above are stable w/o issues.    BP (!) 169/101   Pulse 70   Temp 98.6 °F (37 °C)   Resp 16   Ht 162.6 cm (64\")   Wt (!) 137 kg (301 lb)   SpO2 96%   BMI 51.67 kg/m²     Results for orders placed or performed in visit on 03/09/18   Comprehensive metabolic panel   Result Value Ref Range    Glucose 112 (H) 65 - 99 mg/dL    BUN 18 6 - 24 mg/dL    Creatinine 1.20 0.76 - 1.27 mg/dL    eGFR Non African Am 66 >59 mL/min/1.73    eGFR African Am 76 >59 mL/min/1.73    BUN/Creatinine Ratio 15 9 - 20    Sodium 143 134 - 144 " mmol/L    Potassium 5.1 3.5 - 5.2 mmol/L    Chloride 101 96 - 106 mmol/L    Total CO2 26 18 - 29 mmol/L    Calcium 9.1 8.7 - 10.2 mg/dL    Total Protein 6.6 6.0 - 8.5 g/dL    Albumin 4.2 3.5 - 5.5 g/dL    Globulin 2.4 1.5 - 4.5 g/dL    A/G Ratio 1.8 1.2 - 2.2    Total Bilirubin 0.3 0.0 - 1.2 mg/dL    Alkaline Phosphatase 72 39 - 117 IU/L    AST (SGOT) 19 0 - 40 IU/L    ALT (SGPT) 17 0 - 44 IU/L   Lipid panel   Result Value Ref Range    Total Cholesterol 197 100 - 199 mg/dL    Triglycerides 128 0 - 149 mg/dL    HDL Cholesterol 41 >39 mg/dL    VLDL Cholesterol 26 5 - 40 mg/dL    LDL Cholesterol  130 (H) 0 - 99 mg/dL   TSH   Result Value Ref Range    TSH 1.360 0.450 - 4.500 uIU/mL   PSA   Result Value Ref Range    PSA 0.4 0.0 - 4.0 ng/mL   CBC and Differential   Result Value Ref Range    WBC 8.3 3.4 - 10.8 x10E3/uL    RBC 5.10 4.14 - 5.80 x10E6/uL    Hemoglobin 16.3 13.0 - 17.7 g/dL    Hematocrit 47.2 37.5 - 51.0 %    MCV 93 79 - 97 fL    MCH 32.0 26.6 - 33.0 pg    MCHC 34.5 31.5 - 35.7 g/dL    RDW 13.8 12.3 - 15.4 %    Platelets 304 150 - 379 x10E3/uL    Neutrophil Rel % 64 Not Estab. %    Lymphocyte Rel % 24 Not Estab. %    Monocyte Rel % 9 Not Estab. %    Eosinophil Rel % 3 Not Estab. %    Basophil Rel % 0 Not Estab. %    Neutrophils Absolute 5.3 1.4 - 7.0 x10E3/uL    Lymphocytes Absolute 2.0 0.7 - 3.1 x10E3/uL    Monocytes Absolute 0.8 0.1 - 0.9 x10E3/uL    Eosinophils Absolute 0.3 0.0 - 0.4 x10E3/uL    Basophils Absolute 0.0 0.0 - 0.2 x10E3/uL    Immature Granulocyte Rel % 0 Not Estab. %    Immature Grans Absolute 0.0 0.0 - 0.1 x10E3/uL             The following portions of the patient's history were reviewed and updated as appropriate: allergies, current medications, past family history, past medical history, past social history, past surgical history and problem list.    Review of Systems   Constitutional: Negative for activity change, appetite change and unexpected weight change.   HENT: Negative for  congestion, facial swelling, nosebleeds and sinus pressure.    Eyes: Negative for visual disturbance.   Respiratory: Negative for chest tightness and shortness of breath.    Cardiovascular: Negative for chest pain and palpitations.   Gastrointestinal: Negative for abdominal pain, blood in stool and nausea.   Endocrine: Negative for polyuria.   Genitourinary: Negative for dysuria, flank pain and frequency.   Musculoskeletal: Negative for arthralgias, back pain and neck pain.   Skin: Negative for color change and rash.   Neurological: Negative for syncope and speech difficulty.   Psychiatric/Behavioral: Negative for confusion and decreased concentration.       Objective   Physical Exam   Constitutional: He is oriented to person, place, and time. He appears well-developed and well-nourished.   HENT:   Head: Atraumatic.   Mouth/Throat: Oropharynx is clear and moist. No oropharyngeal exudate.   Eyes: Pupils are equal, round, and reactive to light. EOM are normal.   Neck: Normal range of motion. Neck supple. No thyromegaly present.   Cardiovascular:   No murmur heard.  A FIB   Pulmonary/Chest: Effort normal and breath sounds normal. He has no rales.   Abdominal: Soft. There is no tenderness.   Musculoskeletal: Normal range of motion. He exhibits no edema.   Lymphadenopathy:     He has no cervical adenopathy.   Neurological: He is alert and oriented to person, place, and time.   Skin: Skin is warm and dry.   Psychiatric: He has a normal mood and affect. His behavior is normal.   Nursing note and vitals reviewed.      Assessment/Plan   Semaj was seen today for hypertension.    Diagnoses and all orders for this visit:    Chronic atrial fibrillation (CMS/[facility])    Essential hypertension  -     metoprolol tartrate (LOPRESSOR) 25 MG tablet; Take 0.5 tablets by mouth Every 12 (Twelve) Hours.    History of MRSA infection    CKD (chronic kidney disease) stage 3, GFR 30-59 ml/min    Class 3 obesity due to excess calories with serious  comorbidity and body mass index (BMI) of 50.0 to 59.9 in adult (CMS/[facility])    Other orders  -     fluticasone (FLONASE) 50 MCG/ACT nasal spray; 2 sprays into the nostril(s) as directed by provider Daily.  -     sulfamethoxazole-trimethoprim (BACTRIM DS) 800-160 MG per tablet; Take 1 tablet by mouth 2 (Two) Times a Day.

## 2018-09-20 RX ORDER — SULFAMETHOXAZOLE AND TRIMETHOPRIM 800; 160 MG/1; MG/1
1 TABLET ORAL 2 TIMES DAILY
Qty: 20 TABLET | Refills: 0 | Status: SHIPPED | OUTPATIENT
Start: 2018-09-20 | End: 2019-06-04

## 2019-05-06 ENCOUNTER — OFFICE VISIT (OUTPATIENT)
Dept: FAMILY MEDICINE CLINIC | Facility: CLINIC | Age: 59
End: 2019-05-06

## 2019-05-06 VITALS
HEART RATE: 59 BPM | SYSTOLIC BLOOD PRESSURE: 144 MMHG | OXYGEN SATURATION: 96 % | DIASTOLIC BLOOD PRESSURE: 92 MMHG | RESPIRATION RATE: 16 BRPM | WEIGHT: 275 LBS | HEIGHT: 64 IN | BODY MASS INDEX: 46.95 KG/M2 | TEMPERATURE: 97.7 F

## 2019-05-06 DIAGNOSIS — N18.30 CKD (CHRONIC KIDNEY DISEASE) STAGE 3, GFR 30-59 ML/MIN (HCC): ICD-10-CM

## 2019-05-06 DIAGNOSIS — I15.9 SECONDARY HYPERTENSION: ICD-10-CM

## 2019-05-06 DIAGNOSIS — K21.9 GASTROESOPHAGEAL REFLUX DISEASE, ESOPHAGITIS PRESENCE NOT SPECIFIED: ICD-10-CM

## 2019-05-06 DIAGNOSIS — G89.29 CHRONIC LOW BACK PAIN WITHOUT SCIATICA, UNSPECIFIED BACK PAIN LATERALITY: ICD-10-CM

## 2019-05-06 DIAGNOSIS — I48.91 NEW ONSET A-FIB (HCC): ICD-10-CM

## 2019-05-06 DIAGNOSIS — E78.2 MIXED HYPERLIPIDEMIA: Primary | ICD-10-CM

## 2019-05-06 DIAGNOSIS — M54.50 CHRONIC LOW BACK PAIN WITHOUT SCIATICA, UNSPECIFIED BACK PAIN LATERALITY: ICD-10-CM

## 2019-05-06 DIAGNOSIS — M51.37 DDD (DEGENERATIVE DISC DISEASE), LUMBOSACRAL: ICD-10-CM

## 2019-05-06 PROCEDURE — 99214 OFFICE O/P EST MOD 30 MIN: CPT | Performed by: FAMILY MEDICINE

## 2019-06-04 ENCOUNTER — OFFICE VISIT (OUTPATIENT)
Dept: FAMILY MEDICINE CLINIC | Facility: CLINIC | Age: 59
End: 2019-06-04

## 2019-06-04 VITALS
DIASTOLIC BLOOD PRESSURE: 102 MMHG | RESPIRATION RATE: 16 BRPM | OXYGEN SATURATION: 98 % | TEMPERATURE: 98.9 F | BODY MASS INDEX: 47.46 KG/M2 | SYSTOLIC BLOOD PRESSURE: 161 MMHG | HEART RATE: 59 BPM | HEIGHT: 64 IN | WEIGHT: 278 LBS

## 2019-06-04 DIAGNOSIS — I10 ESSENTIAL HYPERTENSION: ICD-10-CM

## 2019-06-04 DIAGNOSIS — J01.00 SUBACUTE MAXILLARY SINUSITIS: Primary | ICD-10-CM

## 2019-06-04 DIAGNOSIS — I10 HYPERTENSION, UNSPECIFIED TYPE: ICD-10-CM

## 2019-06-04 DIAGNOSIS — J01.00 SUBACUTE MAXILLARY SINUSITIS: ICD-10-CM

## 2019-06-04 DIAGNOSIS — J40 BRONCHITIS: Primary | ICD-10-CM

## 2019-06-04 PROCEDURE — 99214 OFFICE O/P EST MOD 30 MIN: CPT | Performed by: FAMILY MEDICINE

## 2019-06-04 RX ORDER — HYDROCHLOROTHIAZIDE 12.5 MG/1
12.5 TABLET ORAL DAILY
Qty: 30 TABLET | Refills: 0 | Status: SHIPPED | OUTPATIENT
Start: 2019-06-04 | End: 2019-10-02 | Stop reason: SDUPTHER

## 2019-06-04 RX ORDER — CEFDINIR 300 MG/1
300 CAPSULE ORAL 2 TIMES DAILY
Qty: 20 CAPSULE | Refills: 0 | Status: SHIPPED | OUTPATIENT
Start: 2019-06-04 | End: 2019-10-02

## 2019-06-17 ENCOUNTER — OFFICE VISIT (OUTPATIENT)
Dept: FAMILY MEDICINE CLINIC | Facility: CLINIC | Age: 59
End: 2019-06-17

## 2019-06-17 VITALS
TEMPERATURE: 98.1 F | HEART RATE: 77 BPM | HEIGHT: 64 IN | RESPIRATION RATE: 16 BRPM | OXYGEN SATURATION: 96 % | WEIGHT: 277 LBS | SYSTOLIC BLOOD PRESSURE: 140 MMHG | DIASTOLIC BLOOD PRESSURE: 73 MMHG | BODY MASS INDEX: 47.29 KG/M2

## 2019-06-17 DIAGNOSIS — J40 BRONCHITIS: ICD-10-CM

## 2019-06-17 DIAGNOSIS — R05.9 COUGH: Primary | ICD-10-CM

## 2019-06-17 DIAGNOSIS — I10 ESSENTIAL HYPERTENSION: ICD-10-CM

## 2019-06-17 PROCEDURE — 99213 OFFICE O/P EST LOW 20 MIN: CPT | Performed by: FAMILY MEDICINE

## 2019-06-17 PROCEDURE — 71046 X-RAY EXAM CHEST 2 VIEWS: CPT | Performed by: FAMILY MEDICINE

## 2019-10-02 ENCOUNTER — OFFICE VISIT (OUTPATIENT)
Dept: FAMILY MEDICINE CLINIC | Facility: CLINIC | Age: 59
End: 2019-10-02

## 2019-10-02 VITALS
HEIGHT: 64 IN | TEMPERATURE: 97.9 F | DIASTOLIC BLOOD PRESSURE: 100 MMHG | RESPIRATION RATE: 16 BRPM | SYSTOLIC BLOOD PRESSURE: 156 MMHG | WEIGHT: 286 LBS | BODY MASS INDEX: 48.83 KG/M2 | HEART RATE: 55 BPM | OXYGEN SATURATION: 97 %

## 2019-10-02 DIAGNOSIS — K21.9 GASTROESOPHAGEAL REFLUX DISEASE, ESOPHAGITIS PRESENCE NOT SPECIFIED: Primary | ICD-10-CM

## 2019-10-02 DIAGNOSIS — I10 ESSENTIAL HYPERTENSION: ICD-10-CM

## 2019-10-02 DIAGNOSIS — Z12.11 ENCOUNTER FOR SCREENING COLONOSCOPY: ICD-10-CM

## 2019-10-02 DIAGNOSIS — G89.29 CHRONIC LOW BACK PAIN WITHOUT SCIATICA, UNSPECIFIED BACK PAIN LATERALITY: ICD-10-CM

## 2019-10-02 DIAGNOSIS — Z12.5 SCREENING FOR PROSTATE CANCER: ICD-10-CM

## 2019-10-02 DIAGNOSIS — I10 HYPERTENSION, UNSPECIFIED TYPE: ICD-10-CM

## 2019-10-02 DIAGNOSIS — M54.50 CHRONIC LOW BACK PAIN WITHOUT SCIATICA, UNSPECIFIED BACK PAIN LATERALITY: ICD-10-CM

## 2019-10-02 DIAGNOSIS — M51.37 DDD (DEGENERATIVE DISC DISEASE), LUMBOSACRAL: ICD-10-CM

## 2019-10-02 PROCEDURE — 99214 OFFICE O/P EST MOD 30 MIN: CPT | Performed by: FAMILY MEDICINE

## 2019-10-02 RX ORDER — CEFDINIR 300 MG/1
300 CAPSULE ORAL 2 TIMES DAILY
Qty: 20 CAPSULE | Refills: 0 | Status: SHIPPED | OUTPATIENT
Start: 2019-10-02 | End: 2020-04-14

## 2019-10-02 RX ORDER — HYDROCHLOROTHIAZIDE 12.5 MG/1
12.5 TABLET ORAL DAILY
Qty: 90 TABLET | Refills: 1 | Status: SHIPPED | OUTPATIENT
Start: 2019-10-02 | End: 2020-10-09 | Stop reason: SDUPTHER

## 2019-10-02 NOTE — PROGRESS NOTES
Subjective   Chief Complaint:   Chief Complaint   Patient presents with   • Otitis Media   • Prostate Issues         History of Present Illness in the office to ask questions about his prostate.  His last 3 PSAs are in the range of 0.3 he has no progression of his PSA.  I will do a prostate exam today and then I will order labs on him.  His blood pressure was by me about 150/100 he is on Metoprolol tartrate R Lopressor 25 mg tablets p.o. twice daily and hydrochlorothiazide 12.5 mg daily he has some left ear pain and he is got a little redness in his left ear some low-grade put him on Omnicef 300 mg twice daily for 10 days and I am giving him some Zyrtec and some Mucinex DM over-the-counter.  That should clear that up            Semaj Narvaez 58 y.o. male who presents today for Medical Management of the below listed issues and medication refills.    ICD-10-CM ICD-9-CM   1. Gastroesophageal reflux disease, esophagitis presence not specified K21.9 530.81   2. Hypertension, unspecified type I10 401.9   3. Essential hypertension I10 401.9   4. DDD (degenerative disc disease), lumbosacral M51.37 722.52   5. Chronic low back pain without sciatica, unspecified back pain laterality M54.5 724.2    G89.29 338.29   6. Encounter for screening colonoscopy Z12.11 V76.51   7. Screening for prostate cancer Z12.5 V76.44        he has a problem list of   Patient Active Problem List   Diagnosis   • Cellulitis and abscess   • GERD (gastroesophageal reflux disease)   • Hyperlipidemia   • Hypertension   • DDD (degenerative disc disease), lumbosacral   • Low back pain   • Spider bite   • Pneumonia of left lung due to infectious organism   • Noncompliance   • Afib (CMS/HCC)   • CKD (chronic kidney disease) stage 3, GFR 30-59 ml/min (CMS/HCC)   .    he has been compliant with   Current Outpatient Medications:   •  aspirin  MG EC tablet, Take 1 tablet by mouth Daily., Disp: , Rfl: 0  •  fluticasone (FLONASE) 50 MCG/ACT nasal spray, 2  "sprays into the nostril(s) as directed by provider Daily., Disp: 1 g, Rfl: 4  •  Garlic 10 MG capsule, Take  by mouth., Disp: , Rfl:   •  guaiFENesin (MUCINEX) 600 MG 12 hr tablet, Take 1,200 mg by mouth 2 (Two) Times a Day., Disp: , Rfl:   •  hydroCHLOROthiazide (HYDRODIURIL) 12.5 MG tablet, Take 1 tablet by mouth Daily., Disp: 90 tablet, Rfl: 1  •  metoprolol tartrate (LOPRESSOR) 25 MG tablet, Take 0.5 tablets by mouth Every 12 (Twelve) Hours., Disp: 90 tablet, Rfl: 1  •  Omega-3 Fatty Acids (FISH OIL) 1000 MG capsule capsule, Take 1,000 mg by mouth Daily With Breakfast., Disp: , Rfl:   •  omeprazole OTC (PriLOSEC OTC) 20 MG EC tablet, Take 20 mg by mouth As Needed., Disp: , Rfl:   •  cefdinir (OMNICEF) 300 MG capsule, Take 1 capsule by mouth 2 (Two) Times a Day., Disp: 20 capsule, Rfl: 0.  he denies medication side effects.        /100   Pulse 55   Temp 97.9 °F (36.6 °C)   Resp 16   Ht 162.6 cm (64.02\")   Wt 130 kg (286 lb)   SpO2 97%   BMI 49.06 kg/m²     Results for orders placed or performed in visit on 10/02/19   Comprehensive metabolic panel   Result Value Ref Range    Glucose 103 (H) 65 - 99 mg/dL    BUN 20 8 - 23 mg/dL    Creatinine 1.22 0.76 - 1.27 mg/dL    eGFR Non African Am 61 >60 mL/min/1.73    eGFR African Am 73 >60 mL/min/1.73    BUN/Creatinine Ratio 16.4 7.0 - 25.0    Sodium 141 136 - 145 mmol/L    Potassium 5.1 3.5 - 5.2 mmol/L    Chloride 101 98 - 107 mmol/L    Total CO2 28.1 22.0 - 29.0 mmol/L    Calcium 9.2 8.6 - 10.5 mg/dL    Total Protein 6.6 6.0 - 8.5 g/dL    Albumin 4.40 3.50 - 5.20 g/dL    Globulin 2.2 gm/dL    A/G Ratio 2.0 g/dL    Total Bilirubin 0.3 0.2 - 1.2 mg/dL    Alkaline Phosphatase 84 39 - 117 U/L    AST (SGOT) 17 1 - 40 U/L    ALT (SGPT) 17 1 - 41 U/L   PSA SCREENING   Result Value Ref Range    PSA 0.489 0.000 - 4.000 ng/mL             The following portions of the patient's history were reviewed and updated as appropriate: allergies, current medications, past " family history, past medical history, past social history, past surgical history and problem list.    Review of Systems   Constitutional: Negative for activity change, appetite change and unexpected weight change.   Eyes: Negative for visual disturbance.   Respiratory: Negative for chest tightness and shortness of breath.    Cardiovascular: Negative for chest pain and palpitations.   Skin: Negative for color change.   Neurological: Negative for syncope and speech difficulty.   Psychiatric/Behavioral: Negative for confusion and decreased concentration.       Objective   Physical Exam   Constitutional: He is oriented to person, place, and time. He appears well-developed and well-nourished.   HENT:   Head: Atraumatic.   Mouth/Throat: Oropharynx is clear and moist.   Eyes: EOM are normal. Pupils are equal, round, and reactive to light.   Neck: Normal range of motion. Neck supple. No thyromegaly present.   Cardiovascular: Normal rate and regular rhythm.   Pulmonary/Chest: Effort normal and breath sounds normal.   Abdominal: Soft.   Musculoskeletal: Normal range of motion.   Neurological: He is alert and oriented to person, place, and time.   Skin: Skin is warm and dry.   Psychiatric: He has a normal mood and affect. His behavior is normal.   Nursing note and vitals reviewed.      Assessment/Plan   Semaj was seen today for otitis media and prostate issues.    Diagnoses and all orders for this visit:    Gastroesophageal reflux disease, esophagitis presence not specified    Hypertension, unspecified type  -     hydroCHLOROthiazide (HYDRODIURIL) 12.5 MG tablet; Take 1 tablet by mouth Daily.    Essential hypertension  -     metoprolol tartrate (LOPRESSOR) 25 MG tablet; Take 0.5 tablets by mouth Every 12 (Twelve) Hours.  -     Comprehensive metabolic panel    DDD (degenerative disc disease), lumbosacral    Chronic low back pain without sciatica, unspecified back pain laterality    Encounter for screening colonoscopy  -      Ambulatory Referral to Gastroenterology    Screening for prostate cancer  -     PSA SCREENING    Other orders  -     cefdinir (OMNICEF) 300 MG capsule; Take 1 capsule by mouth 2 (Two) Times a Day.                 -Labs results discussed in detail with the patient, if no recent labs were done, order placed today.  Plan to update vaccines if needed today.  I  reviewed health maintenance with the patient as part of my preventative care plan. I discussed preventative counseling with the patient in detail.

## 2019-10-14 LAB
ALBUMIN SERPL-MCNC: 4.4 G/DL (ref 3.5–5.2)
ALBUMIN/GLOB SERPL: 2 G/DL
ALP SERPL-CCNC: 84 U/L (ref 39–117)
ALT SERPL-CCNC: 17 U/L (ref 1–41)
AST SERPL-CCNC: 17 U/L (ref 1–40)
BILIRUB SERPL-MCNC: 0.3 MG/DL (ref 0.2–1.2)
BUN SERPL-MCNC: 20 MG/DL (ref 8–23)
BUN/CREAT SERPL: 16.4 (ref 7–25)
CALCIUM SERPL-MCNC: 9.2 MG/DL (ref 8.6–10.5)
CHLORIDE SERPL-SCNC: 101 MMOL/L (ref 98–107)
CO2 SERPL-SCNC: 28.1 MMOL/L (ref 22–29)
CREAT SERPL-MCNC: 1.22 MG/DL (ref 0.76–1.27)
GLOBULIN SER CALC-MCNC: 2.2 GM/DL
GLUCOSE SERPL-MCNC: 103 MG/DL (ref 65–99)
POTASSIUM SERPL-SCNC: 5.1 MMOL/L (ref 3.5–5.2)
PROT SERPL-MCNC: 6.6 G/DL (ref 6–8.5)
PSA SERPL-MCNC: 0.49 NG/ML (ref 0–4)
SODIUM SERPL-SCNC: 141 MMOL/L (ref 136–145)

## 2019-11-27 ENCOUNTER — OFFICE VISIT (OUTPATIENT)
Dept: FAMILY MEDICINE CLINIC | Facility: CLINIC | Age: 59
End: 2019-11-27

## 2019-11-27 VITALS
WEIGHT: 295 LBS | TEMPERATURE: 98 F | HEIGHT: 64 IN | DIASTOLIC BLOOD PRESSURE: 84 MMHG | RESPIRATION RATE: 16 BRPM | OXYGEN SATURATION: 97 % | HEART RATE: 71 BPM | BODY MASS INDEX: 50.36 KG/M2 | SYSTOLIC BLOOD PRESSURE: 152 MMHG

## 2019-11-27 DIAGNOSIS — R42 DIZZY SPELLS: Primary | ICD-10-CM

## 2019-11-27 PROCEDURE — 93000 ELECTROCARDIOGRAM COMPLETE: CPT | Performed by: FAMILY MEDICINE

## 2019-11-27 PROCEDURE — 99213 OFFICE O/P EST LOW 20 MIN: CPT | Performed by: FAMILY MEDICINE

## 2020-01-02 ENCOUNTER — TELEPHONE (OUTPATIENT)
Dept: FAMILY MEDICINE CLINIC | Facility: CLINIC | Age: 60
End: 2020-01-02

## 2020-01-02 NOTE — TELEPHONE ENCOUNTER
Pt called c/o of BP being high.  Pt states that he had some SOA a few days ago, but is better.  Pt also states that he has some dizziness, but this is something that he has had for a long time.  Advised pt to go to ER. Pt state that he doesn't want to go - he wants to wait and see Dr. Mckoy.  Explained to pt that if he has sx discussed he needs to go to ER.

## 2020-04-11 DIAGNOSIS — I10 ESSENTIAL HYPERTENSION: ICD-10-CM

## 2020-04-14 ENCOUNTER — OFFICE VISIT (OUTPATIENT)
Dept: FAMILY MEDICINE CLINIC | Facility: CLINIC | Age: 60
End: 2020-04-14

## 2020-04-14 DIAGNOSIS — I10 ESSENTIAL HYPERTENSION: Primary | ICD-10-CM

## 2020-04-14 DIAGNOSIS — I10 ESSENTIAL HYPERTENSION: ICD-10-CM

## 2020-04-14 PROCEDURE — 99442 PR PHYS/QHP TELEPHONE EVALUATION 11-20 MIN: CPT | Performed by: FAMILY MEDICINE

## 2020-04-17 ENCOUNTER — TELEPHONE (OUTPATIENT)
Dept: FAMILY MEDICINE CLINIC | Facility: CLINIC | Age: 60
End: 2020-04-17

## 2020-10-09 ENCOUNTER — OFFICE VISIT (OUTPATIENT)
Dept: FAMILY MEDICINE CLINIC | Facility: CLINIC | Age: 60
End: 2020-10-09

## 2020-10-09 VITALS
RESPIRATION RATE: 16 BRPM | SYSTOLIC BLOOD PRESSURE: 146 MMHG | HEIGHT: 64 IN | BODY MASS INDEX: 51.56 KG/M2 | OXYGEN SATURATION: 98 % | HEART RATE: 56 BPM | WEIGHT: 302 LBS | TEMPERATURE: 97.1 F | DIASTOLIC BLOOD PRESSURE: 96 MMHG

## 2020-10-09 DIAGNOSIS — I10 HYPERTENSION, UNSPECIFIED TYPE: ICD-10-CM

## 2020-10-09 DIAGNOSIS — M79.89 SOFT TISSUE MASS: Primary | ICD-10-CM

## 2020-10-09 DIAGNOSIS — I10 ESSENTIAL HYPERTENSION: ICD-10-CM

## 2020-10-09 PROCEDURE — 99214 OFFICE O/P EST MOD 30 MIN: CPT | Performed by: FAMILY MEDICINE

## 2020-10-09 RX ORDER — OMEPRAZOLE 20 MG/1
20 TABLET, DELAYED RELEASE ORAL DAILY
Qty: 90 TABLET | Refills: 1 | Status: SHIPPED | OUTPATIENT
Start: 2020-10-09 | End: 2020-10-09 | Stop reason: SDUPTHER

## 2020-10-09 RX ORDER — HYDROCHLOROTHIAZIDE 12.5 MG/1
12.5 TABLET ORAL DAILY
Qty: 90 TABLET | Refills: 1 | Status: CANCELLED | OUTPATIENT
Start: 2020-10-09

## 2020-10-09 NOTE — PROGRESS NOTES
Subjective   Chief Complaint:       History of Present Illness to do labs.  Also I went over his medicines and I am going to refill prilosec  20 mg 1 a day and metoprolol tartrate 25 mg half tablet p.o. twice daily.  I looked it his chart and he does need labs to do somata put labs in for him to do this next week.  His other medicines he is on aspirin 325 1 a day he is on Flonase nasal spray concerned again about object or mass that he feels in his left lateral lower chest but he thinks he feels the same thing on the right side.  Is been there for at least 2 years.  The area is again his lateral left lower ribs and he feels a small mass and it is tender and that could be just fatty tissue to bone get an exam and that.  We refilled his meds.  When I examined his lateral abdomen if he sits or stands I feel the same area of fatty tissue on both sides below his last lateral ribs on both sides and it is equal but slightly tender but it feels the same mass same area same density on both sides.  Is all just fatty tissue but he is concerned and I am going to order a CT Abdomen/Pelvis.  Labs are over there for him to do so he is going to get the labs and then recheck back with me in a couple weeks with the labs        Past Medical History:   Diagnosis Date   • Cellulitis and abscess 06/13/2013   • DDD (degenerative disc disease), lumbosacral 07/06/2012   • Encounter for eye exam 2011    with dilation   • Erectile dysfunction    • GERD (gastroesophageal reflux disease) 05/31/2013   • Hydradenitis 12/22/2008    suppurativa   • Hyperlipidemia 05/31/2013   • Hypertension     benign essential   • Low back pain 05/31/2013   • Spider bite 06/26/2012    with pustular rash surrounding this bite heart deep abrasion        Semaj Narvaez 59 y.o. male who presents today for Medical Management of the below listed issues and medication refills.    ICD-10-CM ICD-9-CM   1. Soft tissue mass  M79.89 729.99   2. Essential hypertension  I10 401.9  "  3. Hypertension, unspecified type  I10 401.9        he has a problem list of   Patient Active Problem List   Diagnosis   • Cellulitis and abscess   • GERD (gastroesophageal reflux disease)   • Hyperlipidemia   • Hypertension   • DDD (degenerative disc disease), lumbosacral   • Low back pain   • Spider bite   • Pneumonia of left lung due to infectious organism   • Noncompliance   • Afib (CMS/[facility])   • CKD (chronic kidney disease) stage 3, GFR 30-59 ml/min   .    he has been compliant with   Current Outpatient Medications:   •  aspirin  MG EC tablet, Take 1 tablet by mouth Daily., Disp: , Rfl: 0  •  fluticasone (FLONASE) 50 MCG/ACT nasal spray, 2 sprays into the nostril(s) as directed by provider Daily., Disp: 1 g, Rfl: 4  •  Garlic 10 MG capsule, Take  by mouth., Disp: , Rfl:   •  hydroCHLOROthiazide (HYDRODIURIL) 12.5 MG tablet, Take 1 tablet by mouth Daily., Disp: 90 tablet, Rfl: 1  •  metoprolol tartrate (LOPRESSOR) 25 MG tablet, Take 0.5 tablets by mouth Every 12 (Twelve) Hours., Disp: 90 tablet, Rfl: 1  •  Omega-3 Fatty Acids (FISH OIL) 1000 MG capsule capsule, Take 1,000 mg by mouth Daily With Breakfast., Disp: , Rfl:   •  omeprazole OTC (PriLOSEC OTC) 20 MG EC tablet, Take 1 tablet by mouth Daily., Disp: 90 tablet, Rfl: 1.  he denies medication side effects.        /96   Pulse 56   Temp 97.1 °F (36.2 °C)   Resp 16   Ht 162.6 cm (64.02\")   Wt (!) 137 kg (302 lb)   SpO2 98%   BMI 51.81 kg/m²     Results for orders placed or performed in visit on 10/02/19   Comprehensive metabolic panel    Specimen: Blood   Result Value Ref Range    Glucose 103 (H) 65 - 99 mg/dL    BUN 20 8 - 23 mg/dL    Creatinine 1.22 0.76 - 1.27 mg/dL    eGFR Non African Am 61 >60 mL/min/1.73    eGFR African Am 73 >60 mL/min/1.73    BUN/Creatinine Ratio 16.4 7.0 - 25.0    Sodium 141 136 - 145 mmol/L    Potassium 5.1 3.5 - 5.2 mmol/L    Chloride 101 98 - 107 mmol/L    Total CO2 28.1 22.0 - 29.0 mmol/L    Calcium 9.2 8.6 - " 10.5 mg/dL    Total Protein 6.6 6.0 - 8.5 g/dL    Albumin 4.40 3.50 - 5.20 g/dL    Globulin 2.2 gm/dL    A/G Ratio 2.0 g/dL    Total Bilirubin 0.3 0.2 - 1.2 mg/dL    Alkaline Phosphatase 84 39 - 117 U/L    AST (SGOT) 17 1 - 40 U/L    ALT (SGPT) 17 1 - 41 U/L   PSA SCREENING    Specimen: Blood   Result Value Ref Range    PSA 0.489 0.000 - 4.000 ng/mL       The following portions of the patient's history were reviewed and updated as appropriate: allergies, current medications, past family history, past medical history, past social history, past surgical history and problem list.      he has a history of   Patient Active Problem List   Diagnosis   • Cellulitis and abscess   • GERD (gastroesophageal reflux disease)   • Hyperlipidemia   • Hypertension   • DDD (degenerative disc disease), lumbosacral   • Low back pain   • Spider bite   • Pneumonia of left lung due to infectious organism   • Noncompliance   • Afib (CMS/[facility])   • CKD (chronic kidney disease) stage 3, GFR 30-59 ml/min       Review of Systems   Constitutional: Negative for activity change and fatigue.   HENT: Negative for ear discharge and sinus pressure.    Eyes: Negative for photophobia.   Respiratory: Negative for apnea and shortness of breath.    Cardiovascular: Negative for chest pain.   Gastrointestinal: Negative for abdominal distention, abdominal pain and vomiting.   Endocrine: Negative for polyphagia.   Genitourinary: Negative for frequency.   Musculoskeletal: Negative for back pain and neck pain.   Neurological: Negative for syncope and facial asymmetry.   Psychiatric/Behavioral: Negative for agitation and behavioral problems.       Objective   Physical Exam  Vitals signs reviewed.   Constitutional:       Appearance: Normal appearance.   HENT:      Right Ear: Tympanic membrane normal.      Left Ear: Tympanic membrane normal.   Eyes:      Extraocular Movements: Extraocular movements intact.      Pupils: Pupils are equal, round, and reactive to light.    Neck:      Musculoskeletal: No neck rigidity.   Cardiovascular:      Rate and Rhythm: Normal rate and regular rhythm.      Pulses: Normal pulses.   Pulmonary:      Effort: Pulmonary effort is normal.   Abdominal:      General: Abdomen is flat. There is no distension.      Palpations: Abdomen is soft. There is no mass.      Tenderness: There is abdominal tenderness.      Comments: Exam I feel the soft tissue prominence in the lateral part of both right and left abdomen and I think is from obesity he says the left is a little more tender but it is the same on and equal on both sides on the lateral abdomen.  no change in 2 years   Skin:     General: Skin is warm and dry.   Neurological:      General: No focal deficit present.      Mental Status: He is alert.   Psychiatric:         Mood and Affect: Mood normal.         Behavior: Behavior normal.         Assessment/Plan   Semaj was seen today for hypertension and heartburn.    Diagnoses and all orders for this visit:    Soft tissue mass  -     CT Abdomen Pelvis With & Without Contrast  -     Comprehensive metabolic panel  -     Lipid panel  -     CBC and Differential  -     TSH  -     PSA    Essential hypertension  -     Discontinue: metoprolol tartrate (LOPRESSOR) 25 MG tablet; Take 0.5 tablets by mouth Every 12 (Twelve) Hours.  -     metoprolol tartrate (LOPRESSOR) 25 MG tablet; Take 0.5 tablets by mouth Every 12 (Twelve) Hours.  -     Comprehensive metabolic panel  -     Lipid panel  -     CBC and Differential  -     TSH  -     PSA    Hypertension, unspecified type  -     hydroCHLOROthiazide (HYDRODIURIL) 12.5 MG tablet; Take 1 tablet by mouth Daily.  -     Comprehensive metabolic panel  -     Lipid panel  -     CBC and Differential  -     TSH  -     PSA    Other orders  -     Discontinue: omeprazole OTC (PriLOSEC OTC) 20 MG EC tablet; Take 1 tablet by mouth Daily.  -     omeprazole OTC (PriLOSEC OTC) 20 MG EC tablet; Take 1 tablet by mouth Daily.      Labs results  discussed in detail with the patient, if no recent labs were done, order placed today.  Plan to update vaccines if needed today.  I  reviewed health maintenance with the patient as part of my preventative care plan. I discussed preventative counseling with the patient in detail.

## 2020-10-10 RX ORDER — HYDROCHLOROTHIAZIDE 12.5 MG/1
12.5 TABLET ORAL DAILY
Qty: 90 TABLET | Refills: 1 | Status: SHIPPED | OUTPATIENT
Start: 2020-10-10 | End: 2022-01-19 | Stop reason: SDUPTHER

## 2020-10-10 RX ORDER — OMEPRAZOLE 20 MG/1
20 TABLET, DELAYED RELEASE ORAL DAILY
Qty: 90 TABLET | Refills: 1 | Status: SHIPPED | OUTPATIENT
Start: 2020-10-10 | End: 2020-10-16 | Stop reason: SDUPTHER

## 2020-10-15 LAB
ALBUMIN SERPL-MCNC: 4.4 G/DL (ref 3.5–5.2)
ALBUMIN/GLOB SERPL: 2.3 G/DL
ALP SERPL-CCNC: 79 U/L (ref 39–117)
ALT SERPL-CCNC: 17 U/L (ref 1–41)
AST SERPL-CCNC: 15 U/L (ref 1–40)
BASOPHILS # BLD AUTO: 0.05 10*3/MM3 (ref 0–0.2)
BASOPHILS NFR BLD AUTO: 0.7 % (ref 0–1.5)
BILIRUB SERPL-MCNC: 0.5 MG/DL (ref 0–1.2)
BUN SERPL-MCNC: 20 MG/DL (ref 8–23)
BUN/CREAT SERPL: 16.8 (ref 7–25)
CALCIUM SERPL-MCNC: 9 MG/DL (ref 8.6–10.5)
CHLORIDE SERPL-SCNC: 103 MMOL/L (ref 98–107)
CHOLEST SERPL-MCNC: 229 MG/DL (ref 0–200)
CO2 SERPL-SCNC: 31.3 MMOL/L (ref 22–29)
CREAT SERPL-MCNC: 1.19 MG/DL (ref 0.76–1.27)
EOSINOPHIL # BLD AUTO: 0.22 10*3/MM3 (ref 0–0.4)
EOSINOPHIL NFR BLD AUTO: 3 % (ref 0.3–6.2)
ERYTHROCYTE [DISTWIDTH] IN BLOOD BY AUTOMATED COUNT: 12.4 % (ref 12.3–15.4)
GLOBULIN SER CALC-MCNC: 1.9 GM/DL
GLUCOSE SERPL-MCNC: 109 MG/DL (ref 65–99)
HCT VFR BLD AUTO: 47.2 % (ref 37.5–51)
HDLC SERPL-MCNC: 42 MG/DL (ref 40–60)
HGB BLD-MCNC: 16.5 G/DL (ref 13–17.7)
IMM GRANULOCYTES # BLD AUTO: 0.03 10*3/MM3 (ref 0–0.05)
IMM GRANULOCYTES NFR BLD AUTO: 0.4 % (ref 0–0.5)
LDLC SERPL CALC-MCNC: 165 MG/DL (ref 0–100)
LYMPHOCYTES # BLD AUTO: 1.52 10*3/MM3 (ref 0.7–3.1)
LYMPHOCYTES NFR BLD AUTO: 20.9 % (ref 19.6–45.3)
MCH RBC QN AUTO: 32 PG (ref 26.6–33)
MCHC RBC AUTO-ENTMCNC: 35 G/DL (ref 31.5–35.7)
MCV RBC AUTO: 91.7 FL (ref 79–97)
MONOCYTES # BLD AUTO: 0.69 10*3/MM3 (ref 0.1–0.9)
MONOCYTES NFR BLD AUTO: 9.5 % (ref 5–12)
NEUTROPHILS # BLD AUTO: 4.75 10*3/MM3 (ref 1.7–7)
NEUTROPHILS NFR BLD AUTO: 65.5 % (ref 42.7–76)
NRBC BLD AUTO-RTO: 0 /100 WBC (ref 0–0.2)
PLATELET # BLD AUTO: 284 10*3/MM3 (ref 140–450)
POTASSIUM SERPL-SCNC: 5.4 MMOL/L (ref 3.5–5.2)
PROT SERPL-MCNC: 6.3 G/DL (ref 6–8.5)
PSA SERPL-MCNC: 0.4 NG/ML (ref 0–4)
RBC # BLD AUTO: 5.15 10*6/MM3 (ref 4.14–5.8)
SODIUM SERPL-SCNC: 140 MMOL/L (ref 136–145)
TRIGL SERPL-MCNC: 122 MG/DL (ref 0–150)
TSH SERPL DL<=0.005 MIU/L-ACNC: 1.28 UIU/ML (ref 0.27–4.2)
VLDLC SERPL CALC-MCNC: 22 MG/DL (ref 5–40)
WBC # BLD AUTO: 7.26 10*3/MM3 (ref 3.4–10.8)

## 2020-10-16 NOTE — TELEPHONE ENCOUNTER
You sent wrong RX to pharmacy at his appointment; he is needing the omeprazole filled; please send over

## 2020-10-17 RX ORDER — OMEPRAZOLE 20 MG/1
20 TABLET, DELAYED RELEASE ORAL DAILY
Qty: 90 TABLET | Refills: 1 | Status: SHIPPED | OUTPATIENT
Start: 2020-10-17

## 2020-10-28 ENCOUNTER — HOSPITAL ENCOUNTER (OUTPATIENT)
Dept: CT IMAGING | Facility: HOSPITAL | Age: 60
Discharge: HOME | End: 2020-10-28
Admitting: FAMILY MEDICINE

## 2020-10-28 LAB — CREAT BLDA-MCNC: 1.6 MG/DL (ref 0.6–1.3)

## 2020-10-28 PROCEDURE — 25010000002 IOPAMIDOL 61 % SOLUTION: Performed by: FAMILY MEDICINE

## 2020-10-28 PROCEDURE — 82565 ASSAY OF CREATININE: CPT

## 2020-10-28 PROCEDURE — 0 DIATRIZOATE MEGLUMINE & SODIUM PER 1 ML: Performed by: FAMILY MEDICINE

## 2020-10-28 PROCEDURE — 74177 CT ABD & PELVIS W/CONTRAST: CPT

## 2020-10-28 RX ADMIN — DIATRIZOATE MEGLUMINE AND DIATRIZOATE SODIUM 30 ML: 600; 100 SOLUTION ORAL; RECTAL at 14:15

## 2020-10-28 RX ADMIN — IOPAMIDOL 95 ML: 612 INJECTION, SOLUTION INTRAVENOUS at 15:10

## 2020-10-30 ENCOUNTER — OFFICE VISIT (OUTPATIENT)
Dept: FAMILY MEDICINE CLINIC | Facility: CLINIC | Age: 60
End: 2020-10-30

## 2020-10-30 VITALS
WEIGHT: 286 LBS | HEART RATE: 65 BPM | OXYGEN SATURATION: 96 % | HEIGHT: 64 IN | RESPIRATION RATE: 18 BRPM | SYSTOLIC BLOOD PRESSURE: 133 MMHG | TEMPERATURE: 96.8 F | BODY MASS INDEX: 48.83 KG/M2 | DIASTOLIC BLOOD PRESSURE: 95 MMHG

## 2020-10-30 DIAGNOSIS — N20.0 KIDNEY STONE: Primary | ICD-10-CM

## 2020-10-30 PROCEDURE — 99215 OFFICE O/P EST HI 40 MIN: CPT | Performed by: FAMILY MEDICINE

## 2020-10-30 NOTE — PROGRESS NOTES
Subjective   Chief Complaint:   Chief Complaint   Patient presents with   • Imaging Only         History of Present Illness went over his labs and his meds.  Tenderness and slight pain and tenderness 1 small spot in his left lateral abdomen.  Do not really feel a mass he has probably some fatty tissue prominence in the left and the right side but it feels pretty much equal but he does have obesity and it is tough to feel a mass.  But I reviewed a CAT scan of his abdomen and pelvis from 1 year ago and it did show questionable small area like a stone in his left kidney but it was very small somata definitely get a urine with a micro on him today.  We went over his meds he is on Prilosec 20 mg metoprolol tartrate tartrate 25 mg a half p.o. twice daily and he did have the small masses palpated in left lateral abdomen but I think I can feel that on the right side to and is just fatty tissue.  He says been there for 2 years patient claims he is lost 17 pounds in the last 8 days trying to exercise and watch his diet been fasting.  Is got a mass in the left lateral abdomen but it feels more like a soft tissue fatty tissue tumor and he did have a kidney stone on the left kidney on a current CT scan.  Do want a get a urinalysis with a micro before he goes and may be checking back in a week or 2.  He can use omeprazole or Prilosec over-the-counter I would hold off on him seeing a surgeon until or a urologist until I get the urinalysis back.  Plan is to get a urine with a micro I will call him back the results and then will decide whether sent to urologist or general surgeon.          Past Medical History:   Diagnosis Date   • Cellulitis and abscess 06/13/2013   • DDD (degenerative disc disease), lumbosacral 07/06/2012   • Encounter for eye exam 2011    with dilation   • Erectile dysfunction    • GERD (gastroesophageal reflux disease) 05/31/2013   • Hydradenitis 12/22/2008    suppurativa   • Hyperlipidemia 05/31/2013   •  Hypertension     benign essential   • Low back pain 05/31/2013   • Spider bite 06/26/2012    with pustular rash surrounding this bite heart deep abrasion        Semaj Narvaez 59 y.o. male who presents today for xray   No diagnosis found.     he has a problem list of   Patient Active Problem List   Diagnosis   • Cellulitis and abscess   • GERD (gastroesophageal reflux disease)   • Hyperlipidemia   • Hypertension   • DDD (degenerative disc disease), lumbosacral   • Low back pain   • Spider bite   • Pneumonia of left lung due to infectious organism   • Noncompliance   • Afib (CMS/[facility])   • CKD (chronic kidney disease) stage 3, GFR 30-59 ml/min   .    he has been compliant with   Current Outpatient Medications:   •  aspirin  MG EC tablet, Take 1 tablet by mouth Daily., Disp: , Rfl: 0  •  fluticasone (FLONASE) 50 MCG/ACT nasal spray, 2 sprays into the nostril(s) as directed by provider Daily., Disp: 1 g, Rfl: 4  •  Garlic 10 MG capsule, Take  by mouth., Disp: , Rfl:   •  hydroCHLOROthiazide (HYDRODIURIL) 12.5 MG tablet, Take 1 tablet by mouth Daily., Disp: 90 tablet, Rfl: 1  •  metoprolol tartrate (LOPRESSOR) 25 MG tablet, Take 0.5 tablets by mouth Every 12 (Twelve) Hours., Disp: 90 tablet, Rfl: 1  •  Omega-3 Fatty Acids (FISH OIL) 1000 MG capsule capsule, Take 1,000 mg by mouth Daily With Breakfast., Disp: , Rfl:   •  omeprazole OTC (PriLOSEC OTC) 20 MG EC tablet, Take 1 tablet by mouth Daily., Disp: 90 tablet, Rfl: 1.  he denies medication side effects.        There were no vitals taken for this visit.    Results for orders placed or performed during the hospital encounter of 10/28/20   POC Creatinine    Specimen: Blood   Result Value Ref Range    Creatinine 1.60 (H) 0.60 - 1.30 mg/dL       The following portions of the patient's history were reviewed and updated as appropriate: allergies, current medications, past family history, past medical history, past social history, past surgical history and problem  list.      he has a history of   Patient Active Problem List   Diagnosis   • Cellulitis and abscess   • GERD (gastroesophageal reflux disease)   • Hyperlipidemia   • Hypertension   • DDD (degenerative disc disease), lumbosacral   • Low back pain   • Spider bite   • Pneumonia of left lung due to infectious organism   • Noncompliance   • Afib (CMS/HCC)   • CKD (chronic kidney disease) stage 3, GFR 30-59 ml/min       Review of Systems   Constitutional: Negative for fatigue and fever.   Respiratory: Negative for chest tightness and shortness of breath.    Gastrointestinal: Positive for abdominal pain. Negative for abdominal distention.        And the abdominal pain is more in the left lateral abdomen   Genitourinary: Negative for difficulty urinating, dysuria, frequency and hematuria.       Objective   Physical Exam  HENT:      Head: Normocephalic.      Nose: Nose normal.   Eyes:      Pupils: Pupils are equal, round, and reactive to light.   Cardiovascular:      Rate and Rhythm: Normal rate and regular rhythm.      Pulses: Normal pulses.      Heart sounds: No murmur.   Pulmonary:      Effort: No respiratory distress.   Abdominal:      General: There is no distension.      Tenderness: There is abdominal tenderness.      Comments: Tender again in the left lateral abdomen 1 small spot   Skin:     Findings: No lesion or rash.   Neurological:      General: No focal deficit present.      Mental Status: He is alert.   Psychiatric:         Mood and Affect: Mood normal.         Assessment/Plan   There are no diagnoses linked to this encounter.    Pain left lateral abdomen    Kidney stone     left abdominal pain

## 2020-11-02 LAB
APPEARANCE UR: CLEAR
BACTERIA #/AREA URNS HPF: ABNORMAL /[HPF]
BILIRUB UR QL STRIP: NEGATIVE
COLOR UR: YELLOW
EPI CELLS #/AREA URNS HPF: ABNORMAL /HPF (ref 0–10)
GLUCOSE UR QL: NEGATIVE
HGB UR QL STRIP: NEGATIVE
KETONES UR QL STRIP: ABNORMAL
LEUKOCYTE ESTERASE UR QL STRIP: ABNORMAL
Lab: NORMAL
MICRO URNS: ABNORMAL
MUCOUS THREADS URNS QL MICRO: PRESENT
NITRITE UR QL STRIP: NEGATIVE
NTI URINE TUBE (GRAY): NORMAL
PH UR STRIP: 5.5 [PH] (ref 5–7.5)
PROT UR QL STRIP: ABNORMAL
RBC #/AREA URNS HPF: ABNORMAL /HPF (ref 0–2)
SP GR UR: 1.02 (ref 1–1.03)
UROBILINOGEN UR STRIP-MCNC: 1 MG/DL (ref 0.2–1)
WBC #/AREA URNS HPF: >30 /HPF (ref 0–5)

## 2020-11-05 DIAGNOSIS — N39.0 ACUTE UTI: Primary | ICD-10-CM

## 2020-11-05 DIAGNOSIS — N20.0 KIDNEY STONE: ICD-10-CM

## 2020-11-13 LAB
APPEARANCE UR: CLEAR
BACTERIA #/AREA URNS HPF: ABNORMAL /HPF
BACTERIA UR CULT: NORMAL
BACTERIA UR CULT: NORMAL
BILIRUB UR QL STRIP: NEGATIVE
CASTS URNS MICRO: ABNORMAL
COLOR UR: YELLOW
EPI CELLS #/AREA URNS HPF: ABNORMAL /HPF
GLUCOSE UR QL: NEGATIVE
HGB UR QL STRIP: NEGATIVE
KETONES UR QL STRIP: NEGATIVE
LEUKOCYTE ESTERASE UR QL STRIP: ABNORMAL
NITRITE UR QL STRIP: NEGATIVE
PH UR STRIP: <=5 [PH] (ref 5–8)
PROT UR QL STRIP: NEGATIVE
RBC #/AREA URNS HPF: ABNORMAL /HPF
SP GR UR: 1.03 (ref 1–1.03)
UROBILINOGEN UR STRIP-MCNC: ABNORMAL MG/DL
WBC #/AREA URNS HPF: ABNORMAL /HPF

## 2020-11-14 DIAGNOSIS — N39.0 ACUTE UTI: Primary | ICD-10-CM

## 2020-11-14 RX ORDER — SULFAMETHOXAZOLE AND TRIMETHOPRIM 800; 160 MG/1; MG/1
1 TABLET ORAL 2 TIMES DAILY
Qty: 20 TABLET | Refills: 0 | Status: SHIPPED | OUTPATIENT
Start: 2020-11-14 | End: 2021-09-09

## 2021-09-09 ENCOUNTER — OFFICE VISIT (OUTPATIENT)
Dept: FAMILY MEDICINE CLINIC | Facility: CLINIC | Age: 61
End: 2021-09-09

## 2021-09-09 VITALS
BODY MASS INDEX: 52.24 KG/M2 | TEMPERATURE: 98.6 F | HEART RATE: 82 BPM | WEIGHT: 306 LBS | RESPIRATION RATE: 18 BRPM | HEIGHT: 64 IN | SYSTOLIC BLOOD PRESSURE: 144 MMHG | OXYGEN SATURATION: 94 % | DIASTOLIC BLOOD PRESSURE: 99 MMHG
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
## 2021-09-09 DIAGNOSIS — S13.4XXA WHIPLASH INJURY TO NECK, INITIAL ENCOUNTER: ICD-10-CM

## 2021-09-09 PROBLEM — J18.9 PNEUMONIA OF LEFT LUNG DUE TO INFECTIOUS ORGANISM: Status: RESOLVED | Noted: 2017-01-12 | Resolved: 2021-09-09

## 2021-09-09 PROBLEM — Z91.199 NONCOMPLIANCE: Status: RESOLVED | Noted: 2017-01-13 | Resolved: 2021-09-09

## 2021-09-09 PROCEDURE — 99214 OFFICE O/P EST MOD 30 MIN: CPT | Performed by: FAMILY MEDICINE

## 2021-09-09 RX ORDER — CYCLOBENZAPRINE HCL 5 MG
5 TABLET ORAL 3 TIMES DAILY PRN
Qty: 30 TABLET | Refills: 0 | Status: SHIPPED | OUTPATIENT
Start: 2021-09-09 | End: 2021-09-19

## 2021-09-09 RX ORDER — MELOXICAM 15 MG/1
15 TABLET ORAL DAILY
Qty: 30 TABLET | Refills: 0 | Status: SHIPPED | OUTPATIENT
Start: 2021-09-09 | End: 2021-10-09
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
## 2021-11-10 DIAGNOSIS — I10 ESSENTIAL HYPERTENSION: ICD-10-CM

## 2021-11-11 ENCOUNTER — TELEPHONE (OUTPATIENT)
Dept: FAMILY MEDICINE CLINIC | Facility: CLINIC | Age: 61
End: 2021-11-11

## 2021-11-11 NOTE — TELEPHONE ENCOUNTER
Caller: Semaj Narvaez    Relationship: Self    Best call back number: [phone]    What is the medical concern/diagnosis: NEEDS COLONOSCOPY    What specialty or service is being requested: COLONOSCOPY    What is the provider, practice or medical service name: [facility] [address]    What is the office location: N/A    What is the office phone number:     Any additional details: PATIENT SAID HE HAS WORKED WITH DR SANTIAGO FOR A LONG TIME AND WOULD LIKE HIM TO BECOME HIM PCP BUT DOESN'T WANT TO MAKE AN APPOINTMENT. HE WOULD LIKE TO KNOW IF 1.) HE NEEDS A REFERRAL TO GET HIS COLONOSCOPY? AND 2.) IF DR SANTIAGO WILL TAKE HIM ON AS A PATIENT WITHOUT A NEW PATIENT APPOINTMENT

## 2021-11-11 NOTE — TELEPHONE ENCOUNTER
Pt has canceled several appts. I will need to see him before ordering anything. Let him know. Dr. Sawyer

## 2021-12-16 ENCOUNTER — OFFICE VISIT (OUTPATIENT)
Dept: FAMILY MEDICINE CLINIC | Facility: CLINIC | Age: 61
End: 2021-12-16

## 2021-12-16 ENCOUNTER — TELEPHONE (OUTPATIENT)
Dept: FAMILY MEDICINE CLINIC | Facility: CLINIC | Age: 61
End: 2021-12-16

## 2021-12-16 VITALS
SYSTOLIC BLOOD PRESSURE: 152 MMHG | TEMPERATURE: 96.8 F | HEIGHT: 64 IN | BODY MASS INDEX: 51.22 KG/M2 | HEART RATE: 77 BPM | WEIGHT: 300 LBS | OXYGEN SATURATION: 96 % | DIASTOLIC BLOOD PRESSURE: 94 MMHG

## 2021-12-16 DIAGNOSIS — N20.0 KIDNEY STONES: ICD-10-CM

## 2021-12-16 DIAGNOSIS — I10 ESSENTIAL HYPERTENSION: Primary | ICD-10-CM

## 2021-12-16 DIAGNOSIS — Z12.5 PROSTATE CANCER SCREENING: ICD-10-CM

## 2021-12-16 DIAGNOSIS — I48.11 LONGSTANDING PERSISTENT ATRIAL FIBRILLATION (HCC): ICD-10-CM

## 2021-12-16 DIAGNOSIS — J01.00 ACUTE NON-RECURRENT MAXILLARY SINUSITIS: ICD-10-CM

## 2021-12-16 PROCEDURE — 93000 ELECTROCARDIOGRAM COMPLETE: CPT | Performed by: NURSE PRACTITIONER

## 2021-12-16 PROCEDURE — 99214 OFFICE O/P EST MOD 30 MIN: CPT | Performed by: NURSE PRACTITIONER

## 2021-12-16 RX ORDER — AMOXICILLIN AND CLAVULANATE POTASSIUM 875; 125 MG/1; MG/1
1 TABLET, FILM COATED ORAL EVERY 12 HOURS SCHEDULED
Qty: 20 TABLET | Refills: 0 | Status: SHIPPED | OUTPATIENT
Start: 2021-12-16 | End: 2022-01-18
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
## 2021-12-16 NOTE — TELEPHONE ENCOUNTER
Pharmacy Name:  [facility]    Pharmacy representative name: JACIIVORY    Pharmacy representative phone number:[phone]    What medication are you calling in regards to: metoprolol tartrate (LOPRESSOR) 25 MG tablet    What question does the pharmacy have:    Who is the provider that prescribed the medication:     Additional notes: SAYS SHE HAS TWO SETS OF INSTRUCTIONS, NEEDS TO BE CALLED WITH THE CORRECT WAY FOR PATIENT TO TAKE THIS.

## 2021-12-22 ENCOUNTER — TELEPHONE (OUTPATIENT)
Dept: FAMILY MEDICINE CLINIC | Facility: CLINIC | Age: 61
End: 2021-12-22

## 2022-01-03 ENCOUNTER — TELEPHONE (OUTPATIENT)
Dept: FAMILY MEDICINE CLINIC | Facility: CLINIC | Age: 62
End: 2022-01-03

## 2022-01-03 RX ORDER — AZITHROMYCIN 250 MG/1
TABLET, FILM COATED ORAL
Qty: 6 TABLET | Refills: 1 | Status: SHIPPED | OUTPATIENT
Start: 2022-01-03 | End: 2022-01-18
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
## 2022-01-03 NOTE — TELEPHONE ENCOUNTER
Zpak sent to pharmacy per pt insistent that that works better than any other sinus medication.  He states he has taken in the past with no problems    Informed to follow up with continued symptoms to assess if need chest xray if symptoms do not improve    Pt states it is still all in his head .  NO c/o chest pain or pressure.  No sob    Finished Augmentin with only minimal symptom relief

## 2022-01-18 PROBLEM — R73.01 IFG (IMPAIRED FASTING GLUCOSE): Status: ACTIVE | Noted: 2022-01-18

## 2022-01-19 ENCOUNTER — OFFICE VISIT (OUTPATIENT)
Dept: FAMILY MEDICINE CLINIC | Facility: CLINIC | Age: 62
End: 2022-01-19

## 2022-01-19 VITALS
WEIGHT: 303 LBS | SYSTOLIC BLOOD PRESSURE: 124 MMHG | HEIGHT: 64 IN | BODY MASS INDEX: 51.73 KG/M2 | HEART RATE: 82 BPM | RESPIRATION RATE: 16 BRPM | DIASTOLIC BLOOD PRESSURE: 78 MMHG | TEMPERATURE: 97.7 F

## 2022-01-19 DIAGNOSIS — I10 ESSENTIAL HYPERTENSION: Primary | ICD-10-CM

## 2022-01-19 DIAGNOSIS — R42 VERTIGO: ICD-10-CM

## 2022-01-19 PROCEDURE — 99214 OFFICE O/P EST MOD 30 MIN: CPT | Performed by: FAMILY MEDICINE

## 2022-01-19 RX ORDER — HYDROCHLOROTHIAZIDE 12.5 MG/1
12.5 TABLET ORAL DAILY
Qty: 90 TABLET | Refills: 1 | Status: SHIPPED | OUTPATIENT
Start: 2022-01-19
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
## 2022-09-02 ENCOUNTER — TELEPHONE (OUTPATIENT)
Dept: FAMILY MEDICINE CLINIC | Facility: CLINIC | Age: 62
End: 2022-09-02

## 2022-09-02 NOTE — TELEPHONE ENCOUNTER
Caller: Semaj Narvaez    Relationship: Self    Best call back number: [phone]    What medication are you requesting: ANTIBIOTIC    What are your current symptoms: BURNING URINATION    How long have you been experiencing symptoms: 4-5 DAYS    Have you had these symptoms before:    [x] Yes  [] No    Have you been treated for these symptoms before:   [x] Yes  [] No    If a prescription is needed, what is your preferred pharmacy and phone number: [facility] [address] [facility] [phone] [facility] [phone] FX     Additional notes: PATIENT STATES THAT HE HAS HAD ISSUES WITH KIDNEY INFECTIONS. HE ALSO CURRENTLY HAS COVID, AND DOES NOT WANT TO COME INTO THE OFFICE FOR AN APPOINTMENT. REQUESTS A PRESCRIPTION TO TREAT FURTHER. PLEASE CALL AND ADVISE IF NECESSARY

## 2022-09-13 ENCOUNTER — OFFICE VISIT (OUTPATIENT)
Dept: FAMILY MEDICINE CLINIC | Facility: CLINIC | Age: 62
End: 2022-09-13

## 2022-09-13 VITALS
DIASTOLIC BLOOD PRESSURE: 84 MMHG | RESPIRATION RATE: 18 BRPM | BODY MASS INDEX: 50.88 KG/M2 | OXYGEN SATURATION: 94 % | HEIGHT: 64 IN | TEMPERATURE: 97.5 F | HEART RATE: 88 BPM | WEIGHT: 298 LBS | SYSTOLIC BLOOD PRESSURE: 124 MMHG

## 2022-09-13 DIAGNOSIS — Z12.5 PROSTATE CANCER SCREENING: ICD-10-CM

## 2022-09-13 DIAGNOSIS — R30.0 BURNING WITH URINATION: Primary | ICD-10-CM

## 2022-09-13 DIAGNOSIS — Z12.11 COLON CANCER SCREENING: ICD-10-CM

## 2022-09-13 DIAGNOSIS — R19.4 CHANGE IN BOWEL HABIT: ICD-10-CM

## 2022-09-13 DIAGNOSIS — R14.0 ABDOMINAL BLOATING: ICD-10-CM

## 2022-09-13 DIAGNOSIS — I10 ESSENTIAL HYPERTENSION: ICD-10-CM

## 2022-09-13 DIAGNOSIS — K59.00 CONSTIPATION, UNSPECIFIED CONSTIPATION TYPE: ICD-10-CM

## 2022-09-13 DIAGNOSIS — N39.0 ACUTE UTI: ICD-10-CM

## 2022-09-13 LAB
BILIRUB BLD-MCNC: ABNORMAL MG/DL
CLARITY, POC: ABNORMAL
COLOR UR: ABNORMAL
EXPIRATION DATE: ABNORMAL
GLUCOSE UR STRIP-MCNC: NEGATIVE MG/DL
KETONES UR QL: ABNORMAL
LEUKOCYTE EST, POC: ABNORMAL
Lab: ABNORMAL
NITRITE UR-MCNC: NEGATIVE MG/ML
PH UR: 5.5 [PH] (ref 5–8)
PROT UR STRIP-MCNC: ABNORMAL MG/DL
RBC # UR STRIP: ABNORMAL /UL
SP GR UR: 1.03 (ref 1–1.03)
UROBILINOGEN UR QL: ABNORMAL

## 2022-09-13 PROCEDURE — 81003 URINALYSIS AUTO W/O SCOPE: CPT | Performed by: NURSE PRACTITIONER

## 2022-09-13 PROCEDURE — 99214 OFFICE O/P EST MOD 30 MIN: CPT | Performed by: NURSE PRACTITIONER

## 2022-09-13 RX ORDER — CIPROFLOXACIN 500 MG/1
500 TABLET, FILM COATED ORAL EVERY 12 HOURS SCHEDULED
Qty: 20 TABLET | Refills: 0 | Status: SHIPPED | OUTPATIENT
Start: 2022-09-13

## 2022-09-13 NOTE — PROGRESS NOTES
Chief Complaint  burning urination (1-2 WEEKS) and Constipation    Subjective          Semaj presents to [facility]  Is a 61-year-old male complaining of burning with urination increased urgency and frequency x1 to 2 weeks.  He states that he has had UTIs in the past nothing recently no history of pyelonephritis.  He has had some increased fluid intake with no symptoms.  No back or flank pain no testicular or penile pain/discharge no problems with urination not sexually active    Has had some intermittent abdominal bloating and constipation.  He states that it is normal for him to have 1-2 bowel movements per day and now he is going about 2 to 3 days between hard formed stools.  No blood no dark stools or tarry stools.  He has been eating a little worse than normal.  He has been taking apple cider vinegar tablets.  He denies any fever no acute abdominal pain no nausea no vomiting.  He is due for colonoscopy and would like a referral to GI.      We are monitoring his glucose level he would like labs ordered to complete after antibiotics    The following portions of the patient's history were reviewed and updated as appropriate: allergies, current medications, past family history, past medical history, past social history, past surgical history, and problem list        Review of Systems   Eyes: Negative for visual disturbance.   Gastrointestinal: Positive for constipation. Negative for abdominal pain, anal bleeding, blood in stool, diarrhea, nausea, rectal pain and vomiting.   Genitourinary: Positive for dysuria, frequency and urgency. Negative for decreased urine volume, difficulty urinating, discharge, enuresis, flank pain, genital sores, hematuria, penile pain, penile swelling, scrotal swelling and testicular pain.   Musculoskeletal: Negative for back pain.   Neurological: Negative for dizziness and light-headedness.        Objective   Vital Signs:   Vitals:    09/13/22 1018   BP:  "124/84   Pulse: 88   Resp: 18   Temp: 97.5 °F (36.4 °C)   SpO2: 94%   Weight: 135 kg (298 lb)   Height: 162 cm (63.78\")        Class 3 Severe Obesity (BMI >=40). Obesity-related health conditions include the following: hypertension, dyslipidemias and GERD. Obesity is unchanged. BMI is is above average; BMI management plan is completed. We discussed portion control and increasing exercise.        Physical Exam  Vitals reviewed.   Constitutional:       General: He is not in acute distress.  Eyes:      Conjunctiva/sclera: Conjunctivae normal.   Neck:      Thyroid: No thyromegaly.      Vascular: No carotid bruit.   Cardiovascular:      Rate and Rhythm: Normal rate and regular rhythm.      Heart sounds: Normal heart sounds.   Pulmonary:      Effort: Pulmonary effort is normal.      Breath sounds: Normal breath sounds.   Abdominal:      General: Bowel sounds are normal.      Palpations: Abdomen is soft. There is no mass.      Tenderness: There is no abdominal tenderness. There is no right CVA tenderness, left CVA tenderness, guarding or rebound.   Neurological:      Mental Status: He is alert.   Psychiatric:         Attention and Perception: Attention normal.         Mood and Affect: Mood normal.          Result Review :     The following data was reviewed by: KYRSTA Alarcon on 09/13/2022:  Urinalysis With Microscopic - Urine, Clean Catch (09/13/2022 11:21)  Urine Culture - Urine, Urine, Clean Catch (09/13/2022 11:21)  POCT urinalysis dipstick, automated (09/13/2022 11:19) large leukocytes, 100 protein, trace ketones, small bili, moderate blood           Assessment and Plan    Diagnoses and all orders for this visit:    1. Burning with urination (Primary)  Comments:  Take medication as advised  Increase fluid intake  Follow-up with any continued symptoms or worsening  Orders:  -     POCT urinalysis dipstick, automated  -     ciprofloxacin (Cipro) 500 MG tablet; Take 1 tablet by mouth Every 12 (Twelve) Hours. For " infection  Dispense: 20 tablet; Refill: 0    2. Acute UTI  -     Ambulatory Referral to Colorectal Surgery  -     Comprehensive Metabolic Panel  -     CBC & Differential  -     Lipid Panel  -     PSA Screen  -     TSH  -     T4, Free  -     Hemoglobin A1c  -     Urine Culture - Urine, Urine, Clean Catch  -     Urinalysis With Microscopic - Urine, Clean Catch  -     ciprofloxacin (Cipro) 500 MG tablet; Take 1 tablet by mouth Every 12 (Twelve) Hours. For infection  Dispense: 20 tablet; Refill: 0      Return if symptoms worsen or fail to improve.     Patient was given instructions and counseling regarding her condition or for health maintenance advice. Please see specific information pulled into the AVS if appropriate.  Information regarding urinary tract infection for adults was added to the patient's after visit summary today.     -Will send prescription for Cipro to the patient's pharmacy  -Urinalysis dipstick was performed in office today.  Her urine specimen was positive for large amount of leukocytes,  moderate amount of blood, and 100 mg/dL of protein, trace ketones, small bili  -Will send urine specimen for microscopic urinalysis and urine culture.  -Take all medications as prescribed and until completed  -Drink plenty of fluids  -Monitor for temperature and take Tylenol as needed  -Seek immediate medical attention for high fever, chills, back pain, nausea and vomiting, or any other worsening signs or symptoms.  -The patient verbalized understanding of all instructions given today.       3. Essential hypertension  Comments:  Controlled continue current management  Orders:  -     Ambulatory Referral to Colorectal Surgery  -     Comprehensive Metabolic Panel  -     CBC & Differential  -     Lipid Panel  -     PSA Screen  -     TSH  -     T4, Free  -     Hemoglobin A1c    4. Prostate cancer screening  -     PSA Screen    5. Colon cancer screening  Comments:  Due for colonoscopy referred to GI  Orders:  -      Ambulatory Referral to Colorectal Surgery    6. Abdominal bloating  Comments:  Referred to GI  Monitor diet  Follow-up with any abdominal pain or changes  Orders:  -     Ambulatory Referral to Gastroenterology    7. Change in bowel habit  Comments:  Increase fluid intake  Increase fiber  Due for colonoscopy  Orders:  -     Ambulatory Referral to Gastroenterology    8. Constipation, unspecified constipation type  Comments:  Increase fluid intake increase fiber intake  Orders:  -     Ambulatory Referral to Gastroenterology        Follow Up   Return in about 3 months (around 12/13/2022), or if symptoms worsen or fail to improve, for Annual physical.  Patient was given instructions and counseling regarding his condition or for health maintenance advice. Please see specific information pulled into the AVS if appropriate.

## 2022-09-16 ENCOUNTER — TELEPHONE (OUTPATIENT)
Dept: FAMILY MEDICINE CLINIC | Facility: CLINIC | Age: 62
End: 2022-09-16

## 2022-09-16 DIAGNOSIS — R14.0 ABDOMINAL BLOATING: Primary | ICD-10-CM

## 2022-09-16 LAB
APPEARANCE UR: ABNORMAL
BACTERIA #/AREA URNS HPF: ABNORMAL /[HPF]
BACTERIA UR CULT: ABNORMAL
BACTERIA UR CULT: ABNORMAL
BILIRUB UR QL STRIP: NEGATIVE
CASTS URNS MICRO: ABNORMAL
CASTS URNS QL MICRO: PRESENT /LPF
COLOR UR: YELLOW
CRYSTALS URNS MICRO: ABNORMAL
EPI CELLS #/AREA URNS HPF: ABNORMAL /HPF (ref 0–10)
GLUCOSE UR QL STRIP: NEGATIVE
HGB UR QL STRIP: ABNORMAL
KETONES UR QL STRIP: ABNORMAL
LEUKOCYTE ESTERASE UR QL STRIP: ABNORMAL
MICRO URNS: ABNORMAL
NITRITE UR QL STRIP: NEGATIVE
OTHER ANTIBIOTIC SUSC ISLT: ABNORMAL
PH UR STRIP: 5.5 [PH] (ref 5–7.5)
PROT UR QL STRIP: ABNORMAL
RBC #/AREA URNS HPF: ABNORMAL /HPF (ref 0–2)
SP GR UR STRIP: 1.03 (ref 1–1.03)
UNIDENT CRYS URNS QL MICRO: PRESENT
UROBILINOGEN UR STRIP-MCNC: 0.2 MG/DL (ref 0.2–1)
WBC #/AREA URNS HPF: >30 /HPF (ref 0–5)

## 2022-09-16 NOTE — TELEPHONE ENCOUNTER
He has some labs ordered, can check for any elevated wbc count-  I placed the referral to GI ,and he needs a colonoscopy-  will add amylase and lipase   To ER with any severe pain,

## 2022-09-16 NOTE — TELEPHONE ENCOUNTER
Caller: Semaj Narvaez    Relationship: Self    Best call back number: [phone]     What is the best time to reach you: YES    Who are you requesting to speak with (clinical staff, provider,  specific staff member): CLINICAL    Do you know the name of the person who called: NONE    What was the call regarding: PATIENT IS REQUESTING ADDITIONAL LABS TO TEST FOR STOMACH ISSUES.  PATIENT STATES HIS STOMACH IS BLOATED, PAIN FROM EATING, CONSTIPATION.  PATIENT STATED HE HAS NOT EATEN ANYTHING IN TWO DAYS.  PLEASE ADVISE    Do you require a callback: YES

## 2022-10-06 LAB
ALBUMIN SERPL-MCNC: 4.6 G/DL (ref 3.5–5.2)
ALBUMIN/GLOB SERPL: 2.4 G/DL
ALP SERPL-CCNC: 78 U/L (ref 39–117)
ALT SERPL-CCNC: 17 U/L (ref 1–41)
AMYLASE SERPL-CCNC: 25 U/L (ref 28–100)
AST SERPL-CCNC: 17 U/L (ref 1–40)
BASOPHILS # BLD AUTO: 0.05 10*3/MM3 (ref 0–0.2)
BASOPHILS NFR BLD AUTO: 0.6 % (ref 0–1.5)
BILIRUB SERPL-MCNC: 0.6 MG/DL (ref 0–1.2)
BUN SERPL-MCNC: 19 MG/DL (ref 8–23)
BUN/CREAT SERPL: 17.1 (ref 7–25)
CALCIUM SERPL-MCNC: 9.4 MG/DL (ref 8.6–10.5)
CHLORIDE SERPL-SCNC: 102 MMOL/L (ref 98–107)
CHOLEST SERPL-MCNC: 257 MG/DL (ref 0–200)
CO2 SERPL-SCNC: 30 MMOL/L (ref 22–29)
CREAT SERPL-MCNC: 1.11 MG/DL (ref 0.76–1.27)
EGFRCR SERPLBLD CKD-EPI 2021: 74.6 ML/MIN/1.73
EOSINOPHIL # BLD AUTO: 0.25 10*3/MM3 (ref 0–0.4)
EOSINOPHIL NFR BLD AUTO: 3.2 % (ref 0.3–6.2)
ERYTHROCYTE [DISTWIDTH] IN BLOOD BY AUTOMATED COUNT: 12.8 % (ref 12.3–15.4)
GLOBULIN SER CALC-MCNC: 1.9 GM/DL
GLUCOSE SERPL-MCNC: 107 MG/DL (ref 65–99)
HBA1C MFR BLD: 6.2 % (ref 4.8–5.6)
HCT VFR BLD AUTO: 46.6 % (ref 37.5–51)
HDLC SERPL-MCNC: 42 MG/DL (ref 40–60)
HGB BLD-MCNC: 16 G/DL (ref 13–17.7)
IMM GRANULOCYTES # BLD AUTO: 0.02 10*3/MM3 (ref 0–0.05)
IMM GRANULOCYTES NFR BLD AUTO: 0.3 % (ref 0–0.5)
LDLC SERPL CALC-MCNC: 190 MG/DL (ref 0–100)
LIPASE SERPL-CCNC: 24 U/L (ref 13–60)
LYMPHOCYTES # BLD AUTO: 1.84 10*3/MM3 (ref 0.7–3.1)
LYMPHOCYTES NFR BLD AUTO: 23.9 % (ref 19.6–45.3)
MCH RBC QN AUTO: 32.1 PG (ref 26.6–33)
MCHC RBC AUTO-ENTMCNC: 34.3 G/DL (ref 31.5–35.7)
MCV RBC AUTO: 93.6 FL (ref 79–97)
MONOCYTES # BLD AUTO: 0.65 10*3/MM3 (ref 0.1–0.9)
MONOCYTES NFR BLD AUTO: 8.4 % (ref 5–12)
NEUTROPHILS # BLD AUTO: 4.9 10*3/MM3 (ref 1.7–7)
NEUTROPHILS NFR BLD AUTO: 63.6 % (ref 42.7–76)
NRBC BLD AUTO-RTO: 0.1 /100 WBC (ref 0–0.2)
PLATELET # BLD AUTO: 310 10*3/MM3 (ref 140–450)
POTASSIUM SERPL-SCNC: 5.3 MMOL/L (ref 3.5–5.2)
PROT SERPL-MCNC: 6.5 G/DL (ref 6–8.5)
PSA SERPL-MCNC: 2.56 NG/ML (ref 0–4)
RBC # BLD AUTO: 4.98 10*6/MM3 (ref 4.14–5.8)
SODIUM SERPL-SCNC: 142 MMOL/L (ref 136–145)
T4 FREE SERPL-MCNC: 1.06 NG/DL (ref 0.93–1.7)
TRIGL SERPL-MCNC: 136 MG/DL (ref 0–150)
TSH SERPL DL<=0.005 MIU/L-ACNC: 1.35 UIU/ML (ref 0.27–4.2)
VLDLC SERPL CALC-MCNC: 25 MG/DL (ref 5–40)
WBC # BLD AUTO: 7.71 10*3/MM3 (ref 3.4–10.8)

## 2023-01-14 DIAGNOSIS — I10 ESSENTIAL HYPERTENSION: ICD-10-CM

## 2023-01-16 NOTE — TELEPHONE ENCOUNTER
Rx Refill Note  Requested Prescriptions     Pending Prescriptions Disp Refills   • metoprolol tartrate (LOPRESSOR) 25 MG tablet [Pharmacy Med Name: METOPROLOL TARTRATE 25 MG TAB] 90 tablet      Sig: TAKE ONE TABLET BY MOUTH EVERY MORNING AND TAKE 1/2 TABLET BY MOUTH EVERY EVENING      Last office visit with prescribing clinician: 9/13/2022   Next office visit with prescribing clinician: Visit date not found

## 2023-05-15 DIAGNOSIS — I10 ESSENTIAL HYPERTENSION: ICD-10-CM

## 2023-05-15 NOTE — TELEPHONE ENCOUNTER
Caller: Semaj Narvaez    Relationship: Self    Best call back number: [phone] (Mobile)    Requested Prescriptions:   Requested Prescriptions     Pending Prescriptions Disp Refills   • metoprolol tartrate (LOPRESSOR) 25 MG tablet 90 tablet 0     Sig: TAKE ONE TABLET BY MOUTH EVERY MORNING AND TAKE 1/2 TABLET BY MOUTH EVERY EVENING        Pharmacy where request should be sent: [facility] [phone] [facility] [address] [facility] [address] [facility] [phone] [facility] [phone] FX     Last office visit with prescribing clinician: 9/13/2022   Last telemedicine visit with prescribing clinician: 1/14/2023   Next office visit with prescribing clinician: Visit date not found     Additional details provided by patient:  PATIENT HAS 7-10 PILLS LEFT AND NEEDS REFILLED ASAP, PATIENT ALSO ASKED FOR A CALL WHEN SENT TO PHARMACY OR IF HE NEEDS TO COMEIN FOR AN APPT    Does the patient have less than a 3 day supply:  [x] Yes  [] No    Would you like a call back once the refill request has been completed: [x] Yes [] No    If the office needs to give you a call back, can they leave a voicemail: [x] Yes [] No    Olivia Mary Rep   05/15/23 10:18 EDT

## 2023-05-17 ENCOUNTER — TELEPHONE (OUTPATIENT)
Dept: FAMILY MEDICINE CLINIC | Facility: CLINIC | Age: 63
End: 2023-05-17

## 2023-05-19 ENCOUNTER — OFFICE VISIT (OUTPATIENT)
Dept: FAMILY MEDICINE CLINIC | Facility: CLINIC | Age: 63
End: 2023-05-19
Payer: COMMERCIAL

## 2023-05-19 VITALS
BODY MASS INDEX: 50.71 KG/M2 | DIASTOLIC BLOOD PRESSURE: 94 MMHG | HEART RATE: 66 BPM | RESPIRATION RATE: 20 BRPM | TEMPERATURE: 97.5 F | HEIGHT: 64 IN | WEIGHT: 297 LBS | SYSTOLIC BLOOD PRESSURE: 157 MMHG | OXYGEN SATURATION: 92 %

## 2023-05-19 DIAGNOSIS — E78.2 MIXED HYPERLIPIDEMIA: ICD-10-CM

## 2023-05-19 DIAGNOSIS — I10 ESSENTIAL HYPERTENSION: Primary | ICD-10-CM

## 2023-05-19 DIAGNOSIS — K21.9 GASTROESOPHAGEAL REFLUX DISEASE, UNSPECIFIED WHETHER ESOPHAGITIS PRESENT: ICD-10-CM

## 2023-05-19 PROCEDURE — 99214 OFFICE O/P EST MOD 30 MIN: CPT | Performed by: NURSE PRACTITIONER

## 2023-05-19 RX ORDER — HYDROCHLOROTHIAZIDE 12.5 MG/1
12.5 TABLET ORAL DAILY
Qty: 90 TABLET | Refills: 1 | Status: SHIPPED | OUTPATIENT
Start: 2023-05-19

## 2023-05-19 RX ORDER — OMEPRAZOLE 20 MG/1
20 TABLET, DELAYED RELEASE ORAL DAILY
Qty: 90 TABLET | Refills: 1 | Status: SHIPPED | OUTPATIENT
Start: 2023-05-19

## 2023-05-19 NOTE — PROGRESS NOTES
"Chief Complaint  Hypertension    Subjective          Semaj presents to [facility] as a 62-year-old male for follow-up on hypertension, obtain labs, refill medications.  Overall doing okay    History of hypertension has been taking his metoprolol once a day does not want to take this medication twice a day he is also not taking his hydrochlorothiazide.  He states he does not want to have to go to the bathroom more often.  He denies any headaches no blurred vision no dizziness no flushing no chest pain or pressure  He denies any other medication side effects  He declines any additional changes in medication would like to continue taking over-the-counter garlic and fish oil.      He is working on diet and exercise he is having a hard time losing weight.  He would like to discuss weight loss medication.  He is not fasting today and will return for labs    He does have a history of GERD he does take omeprazole and tries to avoid triggers      He has no other acute complaint of    The following portions of the patient's history were reviewed and updated as appropriate: allergies, current medications, past family history, past medical history, past social history, past surgical history, and problem list        Review of Systems   Constitutional: Negative for chills, fatigue and fever.   Eyes: Negative for visual disturbance.   Respiratory: Negative for cough, shortness of breath and wheezing.    Cardiovascular: Negative for chest pain, palpitations and leg swelling.   Gastrointestinal: Negative for abdominal pain, nausea and vomiting.   Skin: Negative for rash.   Neurological: Negative for dizziness and light-headedness.   Psychiatric/Behavioral: Negative for self-injury and suicidal ideas.        Objective   Vital Signs:   Vitals:    05/19/23 1414   BP: 157/94   Pulse: 66   Resp: 20   Temp: 97.5 °F (36.4 °C)   TempSrc: Skin   SpO2: 92%   Weight: 135 kg (297 lb)   Height: 162 cm (63.78\")    "     Class 3 Severe Obesity (BMI >=40). Obesity-related health conditions include the following: hypertension, dyslipidemias and GERD. Obesity is unchanged. BMI is is above average; BMI management plan is completed. We discussed portion control and increasing exercise.        Physical Exam  Vitals reviewed.   Constitutional:       General: He is not in acute distress.  Eyes:      Conjunctiva/sclera: Conjunctivae normal.   Neck:      Thyroid: No thyromegaly.      Vascular: No carotid bruit.   Cardiovascular:      Rate and Rhythm: Normal rate and regular rhythm.      Heart sounds: Normal heart sounds.   Pulmonary:      Effort: Pulmonary effort is normal. No respiratory distress.      Breath sounds: Normal breath sounds. No stridor. No wheezing, rhonchi or rales.   Chest:      Chest wall: No tenderness.   Abdominal:      General: Abdomen is flat. There is no distension.      Palpations: Abdomen is soft. There is no mass.      Tenderness: There is no abdominal tenderness. There is no right CVA tenderness, left CVA tenderness, guarding or rebound.      Hernia: No hernia is present.   Lymphadenopathy:      Cervical: No cervical adenopathy.   Neurological:      Mental Status: He is alert.   Psychiatric:         Attention and Perception: Attention normal.         Mood and Affect: Mood normal.          Result Review :     The following data was reviewed by: KRYSTA Alarcon on 05/19/2023:      Hemoglobin A1c (10/05/2022 11:34)  T4, Free (10/05/2022 11:34)  TSH (10/05/2022 11:34)  PSA Screen (10/05/2022 11:34)  Lipid Panel (10/05/2022 11:34)  CBC & Differential (10/05/2022 11:34)  Comprehensive Metabolic Panel (10/05/2022 11:34)  Lipase (10/05/2022 11:32)  Amylase (10/05/2022 11:32)      Glucose slightly elevated-  Ha1c same as last check  6.2-  still places in prediabetic range-  continue to watch carbs and sugar intake to prevent further progression     Cholesterol still elevated,but improved from last check     We  will watch potassium-  just slightly elevated  Assessment and Plan    Diagnoses and all orders for this visit:    1. Essential hypertension (Primary)  Comments:  Recommend taking medication as directed  Monitor blood pressure at home bring log to next visit  Orders:  -     hydroCHLOROthiazide (HYDRODIURIL) 12.5 MG tablet; Take 1 tablet by mouth Daily.  Dispense: 90 tablet; Refill: 1  -     metoprolol tartrate (LOPRESSOR) 25 MG tablet; TAKE ONE TABLET BY MOUTH EVERY MORNING AND TAKE 1/2 TABLET BY MOUTH EVERY EVENING  Dispense: 90 tablet; Refill: 0  -     Comprehensive Metabolic Panel  -     CBC & Differential  -     Lipid Panel  -     TSH  -     T4, Free  -     Hemoglobin A1c    2. Gastroesophageal reflux disease, unspecified whether esophagitis present  Comments:  Avoid triggers-caffeine, nicotine, spicy foods, alcohol, red sauce  Continue omeprazole  Orders:  -     omeprazole OTC (PriLOSEC OTC) 20 MG EC tablet; Take 1 tablet by mouth Daily.  Dispense: 90 tablet; Refill: 1  -     Comprehensive Metabolic Panel  -     CBC & Differential  -     Lipid Panel  -     TSH  -     T4, Free  -     Hemoglobin A1c    3. Mixed hyperlipidemia  Comments:  Recheck lipid panel with labs  Continue to work on low-cholesterol diet and exercise as tolerated  Goal is 30 minutes moderate intensity most days of the week  Orders:  -     Comprehensive Metabolic Panel  -     CBC & Differential  -     Lipid Panel  -     TSH  -     T4, Free  -     Hemoglobin A1c        Follow Up   Return in about 4 weeks (around 6/16/2023), or if symptoms worsen or fail to improve, for Next scheduled follow up check blood pressure.  Patient was given instructions and counseling regarding his condition or for health maintenance advice. Please see specific information pulled into the AVS if appropriate.

## 2023-05-22 LAB
ALBUMIN SERPL-MCNC: 4.4 G/DL (ref 3.5–5.2)
ALBUMIN/GLOB SERPL: 2.3 G/DL
ALP SERPL-CCNC: 83 U/L (ref 39–117)
ALT SERPL-CCNC: 15 U/L (ref 1–41)
AST SERPL-CCNC: 14 U/L (ref 1–40)
BASOPHILS # BLD AUTO: 0.04 10*3/MM3 (ref 0–0.2)
BASOPHILS NFR BLD AUTO: 0.5 % (ref 0–1.5)
BILIRUB SERPL-MCNC: 0.5 MG/DL (ref 0–1.2)
BUN SERPL-MCNC: 18 MG/DL (ref 8–23)
BUN/CREAT SERPL: 14.2 (ref 7–25)
CALCIUM SERPL-MCNC: 9.5 MG/DL (ref 8.6–10.5)
CHLORIDE SERPL-SCNC: 100 MMOL/L (ref 98–107)
CHOLEST SERPL-MCNC: 225 MG/DL (ref 0–200)
CO2 SERPL-SCNC: 26.2 MMOL/L (ref 22–29)
CREAT SERPL-MCNC: 1.27 MG/DL (ref 0.76–1.27)
EGFRCR SERPLBLD CKD-EPI 2021: 63.1 ML/MIN/1.73
EOSINOPHIL # BLD AUTO: 0.23 10*3/MM3 (ref 0–0.4)
EOSINOPHIL NFR BLD AUTO: 3 % (ref 0.3–6.2)
ERYTHROCYTE [DISTWIDTH] IN BLOOD BY AUTOMATED COUNT: 12.8 % (ref 12.3–15.4)
GLOBULIN SER CALC-MCNC: 1.9 GM/DL
GLUCOSE SERPL-MCNC: 116 MG/DL (ref 65–99)
HBA1C MFR BLD: 6 % (ref 4.8–5.6)
HCT VFR BLD AUTO: 46.7 % (ref 37.5–51)
HDLC SERPL-MCNC: 37 MG/DL (ref 40–60)
HGB BLD-MCNC: 16.4 G/DL (ref 13–17.7)
IMM GRANULOCYTES # BLD AUTO: 0.03 10*3/MM3 (ref 0–0.05)
IMM GRANULOCYTES NFR BLD AUTO: 0.4 % (ref 0–0.5)
LDLC SERPL CALC-MCNC: 156 MG/DL (ref 0–100)
LYMPHOCYTES # BLD AUTO: 1.55 10*3/MM3 (ref 0.7–3.1)
LYMPHOCYTES NFR BLD AUTO: 19.9 % (ref 19.6–45.3)
MCH RBC QN AUTO: 32.9 PG (ref 26.6–33)
MCHC RBC AUTO-ENTMCNC: 35.1 G/DL (ref 31.5–35.7)
MCV RBC AUTO: 93.8 FL (ref 79–97)
MONOCYTES # BLD AUTO: 0.67 10*3/MM3 (ref 0.1–0.9)
MONOCYTES NFR BLD AUTO: 8.6 % (ref 5–12)
NEUTROPHILS # BLD AUTO: 5.27 10*3/MM3 (ref 1.7–7)
NEUTROPHILS NFR BLD AUTO: 67.6 % (ref 42.7–76)
NRBC BLD AUTO-RTO: 0 /100 WBC (ref 0–0.2)
PLATELET # BLD AUTO: 289 10*3/MM3 (ref 140–450)
POTASSIUM SERPL-SCNC: 4.8 MMOL/L (ref 3.5–5.2)
PROT SERPL-MCNC: 6.3 G/DL (ref 6–8.5)
RBC # BLD AUTO: 4.98 10*6/MM3 (ref 4.14–5.8)
SODIUM SERPL-SCNC: 139 MMOL/L (ref 136–145)
T4 FREE SERPL-MCNC: 1.05 NG/DL (ref 0.93–1.7)
TRIGL SERPL-MCNC: 173 MG/DL (ref 0–150)
TSH SERPL DL<=0.005 MIU/L-ACNC: 1.99 UIU/ML (ref 0.27–4.2)
VLDLC SERPL CALC-MCNC: 32 MG/DL (ref 5–40)
WBC # BLD AUTO: 7.79 10*3/MM3 (ref 3.4–10.8)

## 2023-06-07 ENCOUNTER — TELEPHONE (OUTPATIENT)
Dept: FAMILY MEDICINE CLINIC | Facility: CLINIC | Age: 63
End: 2023-06-07

## 2023-06-07 NOTE — TELEPHONE ENCOUNTER
Caller: Semaj Narvaez    Relationship: Self    Best call back number: [phone]      What test was performed: BLOOD LABS    When was the test performed: WEEK AFTER 05-    Additional notes: PATIENT IS REQUESTING TO KNOW THE RESULTS OF THE LABS PREFORMED.    PATIENT IS REQUESTING TO KNOW IF HE MAY GET WEIGHT LOSS INJECTIONS DISCUSSED BEFORE THESE LABS WERE DRAWN.    PLEASE CALL TO DISCUSS.

## 2023-07-13 ENCOUNTER — TELEPHONE (OUTPATIENT)
Dept: FAMILY MEDICINE CLINIC | Facility: CLINIC | Age: 63
End: 2023-07-13

## 2023-07-13 NOTE — TELEPHONE ENCOUNTER
Caller: Semaj Narvaez    Relationship: Self    Best call back number: [phone]     What is the best time to reach you: ANY TIME    Who are you requesting to speak with (clinical staff, provider,  specific staff member): CLINICAL STAFF    What was the call regarding: PATIENT STATES THAT HE WOULD LIKE A CALLBACK TO DISCUSS HIS LATEST LAB TEST RESULTS.  HE ALSO STATES THAT HE WANTS TO SEE IF HIS WHITE BLOOD CELL COUNT IS HIGH BECAUSE HE IS HAVING HIP PAIN AND IS WORRIED ABOUT PROSTATE CANCER.    Is it okay if the provider responds through [email or web address]:     PLEASE ADVISE

## 2023-10-24 DIAGNOSIS — I10 ESSENTIAL HYPERTENSION: ICD-10-CM

## 2023-10-24 NOTE — TELEPHONE ENCOUNTER
"PATIENT IS CALLING BACK STATING HE IS GETTING THE RUN AROUND B/C [facility] HAS TOLD THE PATIENT THAT ONE WEEK AGO [facility] REQUESTED HIS MEDICATION AND AGAIN YESTERDAY.   PATIENT STATES HE HAS 3 PILLS LEFT, WHICH IS 3 DAYS WORTH, AND HE SAID HE IS AT THE PHARMACY NOW AND THERE IS NO REFILL THERE.        PATIENT WANTS A CALLBACK FROM THE OFFICE REGARDING THE ISSUE THAT HAS BEEN GOING ON WITH GETTING HIS MEDICATION REFILLED AND HE STATES THAT \"SOMEONE IS DROPPING THE BALL HERE\".   CALL HIM  [phone].     "

## 2023-10-24 NOTE — TELEPHONE ENCOUNTER
Caller: Semaj Narvaez    Relationship: Self    Best call back number: [phone]    Requested Prescriptions:   Requested Prescriptions     Pending Prescriptions Disp Refills    metoprolol tartrate (LOPRESSOR) 25 MG tablet 45 tablet 0     Sig: TAKE ONE TABLET BY MOUTH EVERY MORNING AND TAKE 1/2 TABLET IN THE EVENING        Pharmacy where request should be sent: [facility] [id number] [facility] [address] [facility] [address] - [phone]  - [phone] FX     Last office visit with prescribing clinician: 5/19/2023   Last telemedicine visit with prescribing clinician: Visit date not found   Next office visit with prescribing clinician: Visit date not found     Additional details provided by patient: 3 PILLS LEFT    Does the patient have less than a 3 day supply:  [x] Yes  [] No    Would you like a call back once the refill request has been completed: [x] Yes [] No    If the office needs to give you a call back, can they leave a voicemail: [x] Yes [] No    Olivia Killian Rep   10/24/23 08:58 EDT

## 2023-10-25 NOTE — TELEPHONE ENCOUNTER
Hub staff attempted to follow warm transfer process and was unsuccessful     Caller: Semaj Narvaez    Relationship to patient: Self    Best call back number: [phone]     Patient is needing: PATIENT STATES HE WAS BEING TRANSFERRED TO [facility], BUT THE PHONE JUST KEPT RINGING AND THEN HUNG UP. HE STATES HE IS REALLY NEEDING THIS MEDICATION BECAUSE HE IS DOWN TO 2 TABLETS.     PATIENT IS REQUESTING A CALL BACK FROM [facility] AS SOON AS POSSIBLE.

## 2023-10-25 NOTE — TELEPHONE ENCOUNTER
Rx Refill Note  Requested Prescriptions     Pending Prescriptions Disp Refills    metoprolol tartrate (LOPRESSOR) 25 MG tablet 45 tablet 0     Sig: TAKE ONE TABLET BY MOUTH EVERY MORNING AND TAKE 1/2 TABLET IN THE EVENING      Last office visit with prescribing clinician: 5/19/2023   Last telemedicine visit with prescribing clinician: Visit date not found   Next office visit with prescribing clinician: 10/30/2023      BP under 140/90 in past year

## 2023-10-30 ENCOUNTER — OFFICE VISIT (OUTPATIENT)
Dept: FAMILY MEDICINE CLINIC | Facility: CLINIC | Age: 63
End: 2023-10-30
Payer: COMMERCIAL

## 2023-10-30 VITALS
OXYGEN SATURATION: 95 % | DIASTOLIC BLOOD PRESSURE: 98 MMHG | SYSTOLIC BLOOD PRESSURE: 156 MMHG | HEART RATE: 71 BPM | BODY MASS INDEX: 49.68 KG/M2 | TEMPERATURE: 97.9 F | HEIGHT: 64 IN | WEIGHT: 291 LBS

## 2023-10-30 DIAGNOSIS — Z12.5 PROSTATE CANCER SCREENING: ICD-10-CM

## 2023-10-30 DIAGNOSIS — H66.003 NON-RECURRENT ACUTE SUPPURATIVE OTITIS MEDIA OF BOTH EARS WITHOUT SPONTANEOUS RUPTURE OF TYMPANIC MEMBRANES: ICD-10-CM

## 2023-10-30 DIAGNOSIS — E78.2 MIXED HYPERLIPIDEMIA: ICD-10-CM

## 2023-10-30 DIAGNOSIS — R73.09 ELEVATED GLUCOSE: ICD-10-CM

## 2023-10-30 DIAGNOSIS — I10 ESSENTIAL HYPERTENSION: Primary | ICD-10-CM

## 2023-10-30 PROCEDURE — 99214 OFFICE O/P EST MOD 30 MIN: CPT | Performed by: NURSE PRACTITIONER

## 2023-10-30 RX ORDER — NEOMYCIN SULFATE, POLYMYXIN B SULFATE AND HYDROCORTISONE 10; 3.5; 1 MG/ML; MG/ML; [USP'U]/ML
3 SUSPENSION/ DROPS AURICULAR (OTIC) 4 TIMES DAILY
Qty: 10 ML | Refills: 0 | Status: SHIPPED | OUTPATIENT
Start: 2023-10-30 | End: 2023-11-06

## 2023-10-30 RX ORDER — HYDROCHLOROTHIAZIDE 12.5 MG/1
12.5 TABLET ORAL DAILY
Qty: 90 TABLET | Refills: 3 | Status: SHIPPED | OUTPATIENT
Start: 2023-10-30

## 2023-10-30 NOTE — PROGRESS NOTES
Chief Complaint  Dizziness and Earache (Bilateral ear pain )    Laura Cha presents to [facility] as a 62-year-old male complaining of bilateral ear pain and dizziness for the past    He has had some intermittent ear pain and pressure bilaterally and some sinus congestion over the past couple of weeks that has gotten worse.  He denies any fever or drainage.  No cough no shortness of breath or wheezing.  No treatment to date.  Denies any sick contacts.    He does have a history of hypertension.  He has not been taking his medication as directed  He was prescribed hydrochlorothiazide 12.5 mg p.o. daily and metoprolol 25 mg 1-1/2 tablets daily  He has only been taking the metoprolol 1 tablet daily  He does have occasional headaches but no blurred vision no chest pain or pressure    History of chronic  A-fib that he chooses not to be treated for, he has not followed up with cardiologist    He has no other acute complaints of today  He is not fasting wants to wait till next visit for labs    He has no other acute complaints at today    The following portions of the patient's history were reviewed and updated as appropriate: allergies, current medications, past family history, past medical history, past social history, past surgical history, and problem list        Review of Systems   Constitutional:  Negative for chills, fatigue and fever.   HENT:  Positive for congestion, ear pain and sinus pressure. Negative for ear discharge.    Eyes:  Negative for visual disturbance.   Respiratory:  Negative for cough, shortness of breath and wheezing.    Cardiovascular:  Negative for chest pain, palpitations and leg swelling.   Gastrointestinal:  Negative for abdominal pain, diarrhea, nausea and vomiting.   Neurological:  Positive for dizziness. Negative for light-headedness.   Psychiatric/Behavioral:  Negative for self-injury and suicidal ideas.         Objective   Vital Signs:  "  Vitals:    10/30/23 1124 10/30/23 1153   BP: (!) 169/111 156/98   Pulse: 71    Temp: 97.9 °F (36.6 °C)    SpO2: 95%    Weight: 132 kg (291 lb)    Height: 162 cm (63.78\")                   Physical Exam  Vitals reviewed.   Constitutional:       General: He is not in acute distress.  Eyes:      Conjunctiva/sclera: Conjunctivae normal.   Neck:      Thyroid: No thyromegaly.      Vascular: No carotid bruit.   Cardiovascular:      Rate and Rhythm: Normal rate. Rhythm irregular.      Heart sounds: Normal heart sounds.   Pulmonary:      Effort: Pulmonary effort is normal. No respiratory distress.      Breath sounds: Normal breath sounds. No stridor. No wheezing, rhonchi or rales.   Chest:      Chest wall: No tenderness.   Lymphadenopathy:      Cervical: No cervical adenopathy.   Neurological:      Mental Status: He is alert.   Psychiatric:         Attention and Perception: Attention normal.         Mood and Affect: Mood normal.          Result Review :     The following data was reviewed by: KRYSTA Alarcon on 10/30/2023:      Comprehensive Metabolic Panel (05/22/2023 11:22)  CBC & Differential (05/22/2023 11:22)  Lipid Panel (05/22/2023 11:22)  TSH (05/22/2023 11:22)  T4, Free (05/22/2023 11:22)  Hemoglobin A1c (05/22/2023 11:22)6.0    Glucose and Ha1c still slightly elevated, but no change-  6.0-  increased risk for diabetes  Continue to watch carb and sugar intake     Normal kidney, thyroid, liver, not anemic, no concerns for infection     Cholesterol improved slightly from last check  Continue to work on lower cholesterol diet and exercise if still not agreeable to statin    Glucose and Ha1c still slightly elevated, but no change-  6.0-  increased risk for diabetes  Continue to watch carb and sugar intake     Normal kidney, thyroid, liver, not anemic, no concerns for infection     Cholesterol improved slightly from last check  Continue to work on lower cholesterol diet and exercise if still not agreeable to " statin     Assessment and Plan    Diagnoses and all orders for this visit:    1. Essential hypertension (Primary)  Comments:  Recommend taking medication as directed  Monitor blood pressure at home bring log to next visit  Orders:  -     hydroCHLOROthiazide (HYDRODIURIL) 12.5 MG tablet; Take 1 tablet by mouth Daily.  Dispense: 90 tablet; Refill: 3  -     metoprolol tartrate (LOPRESSOR) 25 MG tablet; TAKE ONE TABLET BY MOUTH EVERY MORNING AND TAKE 1 1/2 tABLET IN THE EVENING  Dispense: 135 tablet; Refill: 3  -     Comprehensive Metabolic Panel  -     CBC & Differential  -     Lipid Panel  -     T4, Free  -     TSH  -     Hemoglobin A1c    2. Mixed hyperlipidemia  Comments:  Repeat lipid panel with next labs continue to work on lower cholesterol diet and exercise  Orders:  -     Comprehensive Metabolic Panel  -     CBC & Differential  -     Lipid Panel  -     PSA Screen  -     T4, Free  -     TSH    3. Prostate cancer screening  Comments:  Checking PSA with labs no complaints of  Orders:  -     PSA Screen    4. Elevated glucose  Comments:  Recheck hemoglobin A1c with next labs continue to watch carb and sugar intake  Orders:  -     Hemoglobin A1c    5. Non-recurrent acute suppurative otitis media of both ears without spontaneous rupture of tympanic membranes  Comments:  Take medication as directed  Prefers eardrops-recommend oral declined  Follow-up with any worsening symptoms or no improvements  Orders:  -     neomycin-polymyxin-hydrocortisone (CORTISPORIN) 3.5-82651-6 otic suspension; Administer 3 drops into both ears 4 (Four) Times a Day for 7 days. For 7 days  Dispense: 10 mL; Refill: 0        Follow Up   Return in about 4 weeks (around 11/27/2023), or if symptoms worsen or fail to improve, for Follow up for B/p Check.  Patient was given instructions and counseling regarding his condition or for health maintenance advice. Please see specific information pulled into the AVS if appropriate.

## 2023-11-20 DIAGNOSIS — I10 ESSENTIAL HYPERTENSION: ICD-10-CM

## 2023-12-08 DIAGNOSIS — K21.9 GASTROESOPHAGEAL REFLUX DISEASE, UNSPECIFIED WHETHER ESOPHAGITIS PRESENT: ICD-10-CM

## 2023-12-11 RX ORDER — OMEPRAZOLE 20 MG/1
20 CAPSULE, DELAYED RELEASE ORAL DAILY
Qty: 90 CAPSULE | OUTPATIENT
Start: 2023-12-11

## 2023-12-22 ENCOUNTER — TELEPHONE (OUTPATIENT)
Dept: FAMILY MEDICINE CLINIC | Facility: CLINIC | Age: 63
End: 2023-12-22
Payer: COMMERCIAL

## 2023-12-22 DIAGNOSIS — I10 ESSENTIAL HYPERTENSION: ICD-10-CM

## 2023-12-22 NOTE — TELEPHONE ENCOUNTER
Caller: Semaj Narvaez    Relationship: Self    Best call back number: [phone]     Requested Prescriptions:   Requested Prescriptions     Pending Prescriptions Disp Refills    metoprolol tartrate (LOPRESSOR) 25 MG tablet 135 tablet 3     Sig: TAKE ONE TABLET BY MOUTH EVERY MORNING AND TAKE 1 1/2 tABLET IN THE EVENING        Pharmacy where request should be sent: [facility] [id number] [facility] [address] [facility] [address] [facility] [facility] - [phone]  - [phone] FX     Last office visit with prescribing clinician: 10/30/2023   Last telemedicine visit with prescribing clinician: Visit date not found   Next office visit with prescribing clinician: Visit date not found     Additional details provided by patient: PATIENT HAS 1 TABLET LEFT OF MEDICATION    PATIENT IS REQUESTING A CALL BACK       Does the patient have less than a 3 day supply:  [x] Yes  [] No    Would you like a call back once the refill request has been completed: [] Yes [x] No    If the office needs to give you a call back, can they leave a voicemail: [] Yes [x] No    Olivia Vallejo Rep   12/22/23 08:40 EST

## 2023-12-22 NOTE — TELEPHONE ENCOUNTER
ANGELTVM advising of dosing instructions of med dated in Oct. Pt is to take one tab in am and 1/2 tab in pm

## 2023-12-22 NOTE — TELEPHONE ENCOUNTER
Pharmacy Name:  [facility] [id number] - [place], [place] - [address] AT [facility] [address] - [phone] [facility] [phone]      Pharmacy representative name: [facility]    Pharmacy representative phone number: [phone]     What medication are you calling in regards to: metoprolol tartrate (LOPRESSOR) 25 MG tablet     What question does the pharmacy have: PHARMACY IS REQUESTING TO CONFIRM PRESCRIPTION DIRECTIONS AS PATIENT STATED HE ONLY TAKE A HALF TABLET IN THE EVENING.    Who is the provider that prescribed the medication: PEYTON BATES    Additional notes: PLEASE CALL TO DISCUSS.

## 2024-04-17 ENCOUNTER — OFFICE VISIT (OUTPATIENT)
Dept: FAMILY MEDICINE CLINIC | Facility: CLINIC | Age: 64
End: 2024-04-17
Payer: MEDICARE

## 2024-04-17 VITALS
DIASTOLIC BLOOD PRESSURE: 100 MMHG | OXYGEN SATURATION: 96 % | WEIGHT: 296 LBS | BODY MASS INDEX: 50.53 KG/M2 | HEIGHT: 64 IN | HEART RATE: 84 BPM | RESPIRATION RATE: 18 BRPM | SYSTOLIC BLOOD PRESSURE: 140 MMHG

## 2024-04-17 DIAGNOSIS — R53.83 OTHER FATIGUE: ICD-10-CM

## 2024-04-17 DIAGNOSIS — Z12.5 PROSTATE CANCER SCREENING: ICD-10-CM

## 2024-04-17 DIAGNOSIS — R73.09 ELEVATED GLUCOSE: ICD-10-CM

## 2024-04-17 DIAGNOSIS — I10 ESSENTIAL HYPERTENSION: Primary | ICD-10-CM

## 2024-04-17 DIAGNOSIS — E78.2 MIXED HYPERLIPIDEMIA: ICD-10-CM

## 2024-04-17 DIAGNOSIS — I48.0 PAROXYSMAL ATRIAL FIBRILLATION: ICD-10-CM

## 2024-04-17 PROCEDURE — 99214 OFFICE O/P EST MOD 30 MIN: CPT | Performed by: NURSE PRACTITIONER

## 2024-04-17 PROCEDURE — 3077F SYST BP >= 140 MM HG: CPT | Performed by: NURSE PRACTITIONER

## 2024-04-17 PROCEDURE — 3080F DIAST BP >= 90 MM HG: CPT | Performed by: NURSE PRACTITIONER

## 2024-04-17 RX ORDER — FLUTICASONE PROPIONATE 50 MCG
2 SPRAY, SUSPENSION (ML) NASAL DAILY
Qty: 16 G | Refills: 2 | Status: SHIPPED | OUTPATIENT
Start: 2024-04-17

## 2024-04-17 RX ORDER — LOSARTAN POTASSIUM 50 MG/1
50 TABLET ORAL DAILY
Qty: 90 TABLET | Refills: 2 | Status: SHIPPED | OUTPATIENT
Start: 2024-04-17

## 2024-04-17 NOTE — PROGRESS NOTES
Chief Complaint  Hypertension, Atrial Fibrillation (Had a week of feeling weak and fatigued some SOA, hard to make it to the mail box and back.  Is feeling much better now.), labs (Would like labs ordered, wants PSA and testosterone done.), and Nasal Congestion (Wants flonase/)    Subjective        HPI   Semaj presents to [facility] as a 63-year-old male for follow-up on chronic conditions, to order labs, refill medications, discuss referrals and increase symptoms of fatigue shortness of breath with exertion.      Hypertension-he has not been taking his hydrochlorothiazide.  He is only taking 25 mg metoprolol  He does have a history of A-fib.  He has not been seeing a cardiologist.  States that he likes to take natural medication and does not like to take medication however he has been having some increased fatigue and shortness of breath with exertion.  He does not feel this is related to hypertension or A-fib or his heart he feels like this is more related to testosterone levels and would like to have those checked.  He is agreeable to referral to cardiology for checkup  He can feel his heart going in and out of rhythm a couple of times per week.    Previously declined anticoagulation medication      Hyperlipidemia-he does not currently take any medication for cholesterol.  He has not been working on his diet or exercise    He has had some elevated glucose-his sister is diabetic and he she does check his blood pressure occasionally it has been running around 1 20-1 30    GERD-takes omeprazole.  Feels this works well for him.    Does have seasonal allergies.  Would like a refill on his Flonase      He is fasting and agreeable to labs today      The following portions of the patient's history were reviewed and updated as appropriate: allergies, current medications, past family history, past medical history, past social history, past surgical history, and problem list    Review of  "Systems   Constitutional:  Positive for fatigue. Negative for chills and fever.   Eyes:  Negative for visual disturbance.   Respiratory:  Negative for cough, shortness of breath (with exertion), wheezing and stridor.    Cardiovascular:  Positive for palpitations. Negative for chest pain and leg swelling.   Neurological:  Negative for dizziness.   Psychiatric/Behavioral:  Negative for self-injury, sleep disturbance and suicidal ideas. The patient is not nervous/anxious.         Objective   Vital Signs:   Vitals:    04/17/24 1305   BP: 140/100   Pulse: 84   Resp: 18   SpO2: 96%   Weight: 134 kg (296 lb)   Height: 162 cm (63.78\")            10/30/2023    11:25 AM   PHQ-2/PHQ-9 Depression Screening   Little Interest or Pleasure in Doing Things 0-->not at all   Feeling Down, Depressed or Hopeless 0-->not at all   PHQ-9: Brief Depression Severity Measure Score 0                 Physical Exam  Vitals reviewed.   Constitutional:       General: He is not in acute distress.  Eyes:      Conjunctiva/sclera: Conjunctivae normal.   Neck:      Thyroid: No thyromegaly.      Vascular: No carotid bruit.   Cardiovascular:      Rate and Rhythm: Normal rate and regular rhythm.      Heart sounds: Normal heart sounds. No murmur heard.  Pulmonary:      Effort: Pulmonary effort is normal. No respiratory distress.      Breath sounds: Normal breath sounds. No stridor. No wheezing, rhonchi or rales.   Chest:      Chest wall: No tenderness.   Neurological:      Mental Status: He is alert and oriented to person, place, and time.   Psychiatric:         Attention and Perception: Attention normal.         Mood and Affect: Mood normal.          Result Review :     The following data was reviewed by: KRYSTA Alarcon on 04/17/2024:  Hemoglobin A1c (05/22/2023 11:22)  T4, Free (05/22/2023 11:22)  TSH (05/22/2023 11:22)  Lipid Panel (05/22/2023 11:22)  CBC & Differential (05/22/2023 11:22)  Comprehensive Metabolic Panel (05/22/2023 11:22)       " Glucose and Ha1c still slightly elevated, but no change-  6.0-  increased risk for diabetes  Continue to watch carb and sugar intake     Normal kidney, thyroid, liver, not anemic, no concerns for infection     Cholesterol improved slightly from last check  Continue to work on lower cholesterol diet and exercise if still not agreeable to statin    Assessment and Plan    Assessment & Plan  Essential hypertension  Has not been taking hydrochlorothiazide only been taking metoprolol  Add losartan  Monitor blood pressure closely at home bring log to next visit  Mixed hyperlipidemia  Continues to decline cholesterol medication  Repeat lipid panel today  Recommend low-cholesterol diet and exercise if tolerable  Paroxysmal atrial fibrillation  Referral to cardiology increased fatigue and shortness of breath with exertion  Other fatigue  Checking labs today referral to cardiology  Elevated glucose  Checking hemoglobin A1c with labs  Watch carb and sugar intake  Prostate cancer screening  PSA with labs screening only no complaints of      Orders Placed This Encounter   Procedures    Comprehensive metabolic panel    Lipid panel    TSH    Testosterone, Free, Total    PSA Screen    T4, Free    Hemoglobin A1c    MicroAlbumin, Urine, Random - Urine, Clean Catch    Ambulatory Referral to Cardiology    CBC and Differential     New Medications Ordered This Visit   Medications    losartan (Cozaar) 50 MG tablet     Sig: Take 1 tablet by mouth Daily.     Dispense:  90 tablet     Refill:  2    fluticasone (FLONASE) 50 MCG/ACT nasal spray     Si sprays into the nostril(s) as directed by provider Daily.     Dispense:  16 g     Refill:  2          Follow Up   Return in about 4 weeks (around 5/15/2024), or if symptoms worsen or fail to improve, for Follow up for B/p Check.  Patient was given instructions and counseling regarding his condition or for health maintenance advice. Please see specific information pulled into the AVS if  appropriate.

## 2024-04-17 NOTE — ASSESSMENT & PLAN NOTE
Continues to decline cholesterol medication  Repeat lipid panel today  Recommend low-cholesterol diet and exercise if tolerable   HPI:  Tamica Bowers is a 39 y.o. female who is seen for   Chief Complaint   Patient presents with     Tightness in chest     Since tuesday   Tamica Bowers is seen for concerns of left anterior chest wall pain for the past few days that radiates to her left upper arm. She is currently taking an exercise class and this pain does not stop her from exercising and has no associated shortness of breath, sweats, palpitations, claudication or lower leg edema. She is a non-smoker and has no history of asthma or COPD. She denies cough, fever, palpitations, ALMANZAR, shoulder pain, weakness.  ROS as in HPI.  She does not use BCP and has no family history of clotting disorders. Her mother has MV disease due to Rheumatic Fever and has had MV and TV replacements.       No results found for: HGBA1C  Lab Results   Component Value Date    LDLCALC 120 2020    CREATININE 0.69 2020     Patient Active Problem List   Diagnosis     Allergic rhinitis due to other allergen     Anxiety     Encounter for screening for cardiovascular disorders     Status post  delivery     Family History   Problem Relation Age of Onset     Heart disease Mother      Dementia Mother      Pulmonary fibrosis Father      Hypothyroidism Brother      Breast cancer Maternal Grandmother      Social History     Socioeconomic History     Marital status:      Spouse name: None     Number of children: None     Years of education: None     Highest education level: None   Occupational History     None   Social Needs     Financial resource strain: None     Food insecurity:     Worry: None     Inability: None     Transportation needs:     Medical: None     Non-medical: None   Tobacco Use     Smoking status: Never Smoker     Smokeless tobacco: Never Used   Substance and Sexual Activity     Alcohol use: None     Drug use: No     Sexual activity: Yes     Partners: Male   Lifestyle     Physical activity:     Days per week: None     Minutes per session:  None     Stress: None   Relationships     Social connections:     Talks on phone: None     Gets together: None     Attends Jain service: None     Active member of club or organization: None     Attends meetings of clubs or organizations: None     Relationship status: None     Intimate partner violence:     Fear of current or ex partner: None     Emotionally abused: None     Physically abused: None     Forced sexual activity: None   Other Topics Concern     None   Social History Narrative     None     Past Surgical History:   Procedure Laterality Date     Cataract surgery Left     After extended iritis, prednisone use.  Lens implant.      SECTION, CLASSIC      X2     Current Outpatient Medications on File Prior to Visit   Medication Sig Dispense Refill     levonorgestrel (MIRENA) 20 mcg/24 hr (5 years) IUD 20 mcg by Intrauterine route.       No current facility-administered medications on file prior to visit.      No Known Allergies  OB History        2    Para   2    Term   2            AB        Living           SAB        TAB        Ectopic        Multiple        Live Births                   I have reviewed the patient's medical history in detail and updated the computerized patient record.  OBJECTIVE:  Wt Readings from Last 3 Encounters:   20 129 lb 6 oz (58.7 kg)   20 131 lb (59.4 kg)   18 135 lb (61.2 kg)     Temp Readings from Last 3 Encounters:   No data found for Temp     BP Readings from Last 3 Encounters:   20 120/60   20 110/68   18 110/72     Pulse Readings from Last 3 Encounters:   20 78     Body mass index is 24.25 kg/m .     Alert, cooperative, well-hydrated. Appears well.  Eyes: Pupils equal, round, reactive to light.  HEENT: Sclera white, nares patent, MMM. Neck supple without lymphadenopathy or thyromegaly. No JVD.  Lungs: Clear to auscultation. No retractions, no increased work of respiration, equal chest rise.   Heart: Regular  rate and rhythm, no murmurs, clicks,   Gallops. Minimal tenderness to palpation of left lateral pectoralis with no palpable masses.  Left shoulder: ROM full, no visible defects. Lift off and empty can negative.   Extremities: no tenderness to palpation of gastrocnemius, bilaterally.  Skin: no increased warmth, edema, or erythema of lower legs bilaterally.    Labs:  Lab on 01/14/2020   Component Date Value     WBC 01/14/2020 5.4      RBC 01/14/2020 4.98      Hemoglobin 01/14/2020 14.8      Hematocrit 01/14/2020 44.9      MCV 01/14/2020 90      MCH 01/14/2020 29.8      MCHC 01/14/2020 33.0      RDW 01/14/2020 11.3      Platelets 01/14/2020 249      MPV 01/14/2020 8.2      Cholesterol 01/14/2020 212*     Triglycerides 01/14/2020 70      HDL Cholesterol 01/14/2020 78      LDL Calculated 01/14/2020 120      Patient Fasting > 8hrs? 01/14/2020 Yes      TSH 01/14/2020 4.95      Sodium 01/14/2020 139      Potassium 01/14/2020 4.4      Chloride 01/14/2020 105      CO2 01/14/2020 26      Anion Gap, Calculation 01/14/2020 8      Glucose 01/14/2020 79      BUN 01/14/2020 10      Creatinine 01/14/2020 0.69      GFR MDRD Af Amer 01/14/2020 >60      GFR MDRD Non Af Amer 01/14/2020 >60      Bilirubin, Total 01/14/2020 1.0      Calcium 01/14/2020 9.8      Protein, Total 01/14/2020 7.2      Albumin 01/14/2020 4.2      Alkaline Phosphatase 01/14/2020 64      AST 01/14/2020 24      ALT 01/14/2020 20      Vitamin D, Total (25-Hyd* 01/14/2020 37.2    Results   Electrocardiogram Perform - Clinic (Order [id number])   Electrocardiogram Perform - Clinic   Order: [id number]   Status:  Final result   Visible to patient:  Yes (MyChart)   Next appt:  None   Dx:  Chest discomfort     Ref Range & Units 2/14/20 1237    SYSTOLIC BLOOD PRESSURE      DIASTOLIC BLOOD PRESSURE      VENTRICULAR RATE BPM 73     ATRIAL RATE BPM 73     P-R INTERVAL ms 130     QRS DURATION ms 70     Q-T INTERVAL ms 380     QTC CALCULATION (BEZET) ms 418     P Axis degrees 61      R AXIS degrees 55     T AXIS degrees 42     MUSE DIAGNOSIS  Normal sinus rhythm with sinus arrhythmia   Normal ECG   No previous ECGs available   Confirmed by EM ARRIAGA MD LOC:[facility] [id number]) on 2/14/2020 2:20:39 PM    Resulting Agency  [facility]         Specimen Collected: 02/14/20 12:37   Last Resulted: 02/14/20 14:20   Lab Flowsheet Order Details View Encounter Lab and Collection Details Routing Result History         Linked Documents      View Image             ASSESMENT/PLAN:  1. Chest discomfort  Electrocardiogram Perform - Clinic   2. Situational anxiety  LORazepam (ATIVAN) 0.5 MG tablet   1. Discussed common causes of chest wall pain. EKG normal. Start Ibuprofen 600-800 mg three times a day, if not improved in the next 2 weeks follow up with Clarissa Pickard PA-C  2. Refill given of Lorazepam for situational anxiety with dental work.  Clarissa Pickard, MS, PA-C 02/17/20

## 2024-04-20 LAB
ALBUMIN SERPL-MCNC: 4.3 G/DL (ref 3.5–5.2)
ALBUMIN/GLOB SERPL: 2.2 G/DL
ALP SERPL-CCNC: 83 U/L (ref 39–117)
ALT SERPL-CCNC: 16 U/L (ref 1–41)
AST SERPL-CCNC: 13 U/L (ref 1–40)
BASOPHILS # BLD AUTO: 0.05 10*3/MM3 (ref 0–0.2)
BASOPHILS NFR BLD AUTO: 0.6 % (ref 0–1.5)
BILIRUB SERPL-MCNC: 0.5 MG/DL (ref 0–1.2)
BUN SERPL-MCNC: 23 MG/DL (ref 8–23)
BUN/CREAT SERPL: 18.9 (ref 7–25)
CALCIUM SERPL-MCNC: 9.2 MG/DL (ref 8.6–10.5)
CHLORIDE SERPL-SCNC: 104 MMOL/L (ref 98–107)
CHOLEST SERPL-MCNC: 229 MG/DL (ref 0–200)
CO2 SERPL-SCNC: 23.9 MMOL/L (ref 22–29)
CREAT SERPL-MCNC: 1.22 MG/DL (ref 0.76–1.27)
EGFRCR SERPLBLD CKD-EPI 2021: 65.8 ML/MIN/1.73
EOSINOPHIL # BLD AUTO: 0.29 10*3/MM3 (ref 0–0.4)
EOSINOPHIL NFR BLD AUTO: 3.7 % (ref 0.3–6.2)
ERYTHROCYTE [DISTWIDTH] IN BLOOD BY AUTOMATED COUNT: 12.5 % (ref 12.3–15.4)
GLOBULIN SER CALC-MCNC: 2 GM/DL
GLUCOSE SERPL-MCNC: 115 MG/DL (ref 65–99)
HBA1C MFR BLD: 6 % (ref 4.8–5.6)
HCT VFR BLD AUTO: 47.9 % (ref 37.5–51)
HDLC SERPL-MCNC: 39 MG/DL (ref 40–60)
HGB BLD-MCNC: 16.3 G/DL (ref 13–17.7)
IMM GRANULOCYTES # BLD AUTO: 0.02 10*3/MM3 (ref 0–0.05)
IMM GRANULOCYTES NFR BLD AUTO: 0.3 % (ref 0–0.5)
LDLC SERPL CALC-MCNC: 167 MG/DL (ref 0–100)
LYMPHOCYTES # BLD AUTO: 2.24 10*3/MM3 (ref 0.7–3.1)
LYMPHOCYTES NFR BLD AUTO: 28.5 % (ref 19.6–45.3)
MCH RBC QN AUTO: 31.8 PG (ref 26.6–33)
MCHC RBC AUTO-ENTMCNC: 34 G/DL (ref 31.5–35.7)
MCV RBC AUTO: 93.6 FL (ref 79–97)
MONOCYTES # BLD AUTO: 0.76 10*3/MM3 (ref 0.1–0.9)
MONOCYTES NFR BLD AUTO: 9.7 % (ref 5–12)
NEUTROPHILS # BLD AUTO: 4.5 10*3/MM3 (ref 1.7–7)
NEUTROPHILS NFR BLD AUTO: 57.2 % (ref 42.7–76)
NRBC BLD AUTO-RTO: 0 /100 WBC (ref 0–0.2)
PLATELET # BLD AUTO: 282 10*3/MM3 (ref 140–450)
POTASSIUM SERPL-SCNC: 4.4 MMOL/L (ref 3.5–5.2)
PROT SERPL-MCNC: 6.3 G/DL (ref 6–8.5)
PSA SERPL-MCNC: 0.78 NG/ML (ref 0–4)
RBC # BLD AUTO: 5.12 10*6/MM3 (ref 4.14–5.8)
SODIUM SERPL-SCNC: 140 MMOL/L (ref 136–145)
T4 FREE SERPL-MCNC: 1.11 NG/DL (ref 0.93–1.7)
TESTOST FREE SERPL-MCNC: 5.7 PG/ML (ref 6.6–18.1)
TESTOST SERPL-MCNC: 267 NG/DL (ref 264–916)
TRIGL SERPL-MCNC: 125 MG/DL (ref 0–150)
TSH SERPL DL<=0.005 MIU/L-ACNC: 2.38 UIU/ML (ref 0.27–4.2)
VLDLC SERPL CALC-MCNC: 23 MG/DL (ref 5–40)
WBC # BLD AUTO: 7.86 10*3/MM3 (ref 3.4–10.8)

## 2024-04-22 NOTE — PROGRESS NOTES
Good afternoon Semaj  Your lab results are back    Overall your results are stable  Cholesterol is still slightly elevated would recommend taking cholesterol medication if agreeable  Continue to work on lower cholesterol diet and exercise.  Goal is greater than 150 minutes moderate intensity weekly     Your hemoglobin A1c is stable at 6.0-no increase or changes still make you prediabetic  Watch your carb and sugar intake thyroid is normal, you are not anemic, your prostate screening is normal,,  normal liver and kidney enzymes    Testosterone is on the low end of normal  I can refer you to urology if you would like to discuss treatment options  We do need to make sure your blood pressure is stable/controlled before we discuss any testosterone treatment    Let me know if you have any questions  Jessica

## 2024-05-09 ENCOUNTER — APPOINTMENT (OUTPATIENT)
Dept: CT IMAGING | Facility: HOSPITAL | Age: 64
End: 2024-05-09
Payer: MEDICARE

## 2024-05-09 ENCOUNTER — APPOINTMENT (OUTPATIENT)
Dept: GENERAL RADIOLOGY | Facility: HOSPITAL | Age: 64
End: 2024-05-09
Payer: MEDICARE

## 2024-05-09 ENCOUNTER — HOSPITAL ENCOUNTER (INPATIENT)
Facility: HOSPITAL | Age: 64
LOS: 6 days | Discharge: HOME | End: 2024-05-16
Attending: STUDENT IN AN ORGANIZED HEALTH CARE EDUCATION/TRAINING PROGRAM | Admitting: INTERNAL MEDICINE
Payer: MEDICARE

## 2024-05-09 DIAGNOSIS — G47.33 OBSTRUCTIVE SLEEP APNEA: ICD-10-CM

## 2024-05-09 DIAGNOSIS — I62.9 INTRACRANIAL HEMORRHAGE: Primary | ICD-10-CM

## 2024-05-09 DIAGNOSIS — Z74.09 DECREASED MOBILITY AND ENDURANCE: ICD-10-CM

## 2024-05-09 DIAGNOSIS — I16.9 HYPERTENSIVE CRISIS: ICD-10-CM

## 2024-05-09 PROBLEM — I61.9 INTRAPARENCHYMAL HEMORRHAGE OF BRAIN: Status: ACTIVE | Noted: 2024-05-09

## 2024-05-09 LAB
ABO GROUP BLD: NORMAL
ALBUMIN SERPL-MCNC: 4.3 G/DL (ref 3.5–5.2)
ALBUMIN/GLOB SERPL: 2 G/DL
ALP SERPL-CCNC: 80 U/L (ref 39–117)
ALT SERPL W P-5'-P-CCNC: 16 U/L (ref 1–41)
ANION GAP SERPL CALCULATED.3IONS-SCNC: 13 MMOL/L (ref 5–15)
APTT PPP: 26.1 SECONDS (ref 22.7–35.4)
AST SERPL-CCNC: 12 U/L (ref 1–40)
BASOPHILS # BLD AUTO: 0.05 10*3/MM3 (ref 0–0.2)
BASOPHILS NFR BLD AUTO: 0.6 % (ref 0–1.5)
BILIRUB SERPL-MCNC: 0.3 MG/DL (ref 0–1.2)
BLD GP AB SCN SERPL QL: NEGATIVE
BUN SERPL-MCNC: 12 MG/DL (ref 8–23)
BUN/CREAT SERPL: 9.7 (ref 7–25)
CALCIUM SPEC-SCNC: 8.9 MG/DL (ref 8.6–10.5)
CHLORIDE SERPL-SCNC: 104 MMOL/L (ref 98–107)
CO2 SERPL-SCNC: 26 MMOL/L (ref 22–29)
CREAT SERPL-MCNC: 1.24 MG/DL (ref 0.76–1.27)
DEPRECATED RDW RBC AUTO: 44.9 FL (ref 37–54)
EGFRCR SERPLBLD CKD-EPI 2021: 64.5 ML/MIN/1.73
EOSINOPHIL # BLD AUTO: 0.3 10*3/MM3 (ref 0–0.4)
EOSINOPHIL NFR BLD AUTO: 3.9 % (ref 0.3–6.2)
ERYTHROCYTE [DISTWIDTH] IN BLOOD BY AUTOMATED COUNT: 12.9 % (ref 12.3–15.4)
GLOBULIN UR ELPH-MCNC: 2.2 GM/DL
GLUCOSE SERPL-MCNC: 138 MG/DL (ref 65–99)
HCT VFR BLD AUTO: 45.4 % (ref 37.5–51)
HGB BLD-MCNC: 15.5 G/DL (ref 13–17.7)
HOLD SPECIMEN: NORMAL
HOLD SPECIMEN: NORMAL
IMM GRANULOCYTES # BLD AUTO: 0.03 10*3/MM3 (ref 0–0.05)
IMM GRANULOCYTES NFR BLD AUTO: 0.4 % (ref 0–0.5)
INR PPP: 1 (ref 0.9–1.1)
LYMPHOCYTES # BLD AUTO: 1.85 10*3/MM3 (ref 0.7–3.1)
LYMPHOCYTES NFR BLD AUTO: 23.7 % (ref 19.6–45.3)
MCH RBC QN AUTO: 32.3 PG (ref 26.6–33)
MCHC RBC AUTO-ENTMCNC: 34.1 G/DL (ref 31.5–35.7)
MCV RBC AUTO: 94.6 FL (ref 79–97)
MONOCYTES # BLD AUTO: 0.73 10*3/MM3 (ref 0.1–0.9)
MONOCYTES NFR BLD AUTO: 9.4 % (ref 5–12)
NEUTROPHILS NFR BLD AUTO: 4.83 10*3/MM3 (ref 1.7–7)
NEUTROPHILS NFR BLD AUTO: 62 % (ref 42.7–76)
NRBC BLD AUTO-RTO: 0 /100 WBC (ref 0–0.2)
PLATELET # BLD AUTO: 255 10*3/MM3 (ref 140–450)
PMV BLD AUTO: 10.3 FL (ref 6–12)
POTASSIUM SERPL-SCNC: 4 MMOL/L (ref 3.5–5.2)
PROT SERPL-MCNC: 6.5 G/DL (ref 6–8.5)
PROTHROMBIN TIME: 13.4 SECONDS (ref 11.7–14.2)
RBC # BLD AUTO: 4.8 10*6/MM3 (ref 4.14–5.8)
RH BLD: POSITIVE
SODIUM SERPL-SCNC: 143 MMOL/L (ref 136–145)
T&S EXPIRATION DATE: NORMAL
TROPONIN T SERPL HS-MCNC: 17 NG/L
WBC NRBC COR # BLD AUTO: 7.79 10*3/MM3 (ref 3.4–10.8)
WHOLE BLOOD HOLD COAG: NORMAL
WHOLE BLOOD HOLD SPECIMEN: NORMAL

## 2024-05-09 PROCEDURE — 85025 COMPLETE CBC W/AUTO DIFF WBC: CPT | Performed by: PHYSICIAN ASSISTANT

## 2024-05-09 PROCEDURE — 93010 ELECTROCARDIOGRAM REPORT: CPT | Performed by: INTERNAL MEDICINE

## 2024-05-09 PROCEDURE — 86850 RBC ANTIBODY SCREEN: CPT | Performed by: PHYSICIAN ASSISTANT

## 2024-05-09 PROCEDURE — 85730 THROMBOPLASTIN TIME PARTIAL: CPT | Performed by: PHYSICIAN ASSISTANT

## 2024-05-09 PROCEDURE — 80053 COMPREHEN METABOLIC PANEL: CPT | Performed by: PHYSICIAN ASSISTANT

## 2024-05-09 PROCEDURE — 25810000003 SODIUM CHLORIDE 0.9 % SOLUTION: Performed by: PHYSICIAN ASSISTANT

## 2024-05-09 PROCEDURE — 85610 PROTHROMBIN TIME: CPT | Performed by: PHYSICIAN ASSISTANT

## 2024-05-09 PROCEDURE — 25010000002 NICARDIPINE 2.5 MG/ML SOLUTION: Performed by: PHYSICIAN ASSISTANT

## 2024-05-09 PROCEDURE — 86900 BLOOD TYPING SEROLOGIC ABO: CPT | Performed by: PHYSICIAN ASSISTANT

## 2024-05-09 PROCEDURE — 70450 CT HEAD/BRAIN W/O DYE: CPT

## 2024-05-09 PROCEDURE — 71045 X-RAY EXAM CHEST 1 VIEW: CPT

## 2024-05-09 PROCEDURE — 99291 CRITICAL CARE FIRST HOUR: CPT

## 2024-05-09 PROCEDURE — 84484 ASSAY OF TROPONIN QUANT: CPT | Performed by: PHYSICIAN ASSISTANT

## 2024-05-09 PROCEDURE — 82565 ASSAY OF CREATININE: CPT

## 2024-05-09 PROCEDURE — 36415 COLL VENOUS BLD VENIPUNCTURE: CPT

## 2024-05-09 PROCEDURE — 93005 ELECTROCARDIOGRAM TRACING: CPT | Performed by: PHYSICIAN ASSISTANT

## 2024-05-09 PROCEDURE — 86901 BLOOD TYPING SEROLOGIC RH(D): CPT | Performed by: PHYSICIAN ASSISTANT

## 2024-05-09 RX ORDER — SODIUM CHLORIDE 0.9 % (FLUSH) 0.9 %
10 SYRINGE (ML) INJECTION AS NEEDED
Status: DISCONTINUED | OUTPATIENT
Start: 2024-05-09 | End: 2024-05-16 | Stop reason: HOSPADM

## 2024-05-09 RX ADMIN — NICARDIPINE HYDROCHLORIDE 5 MG/HR: 25 INJECTION, SOLUTION INTRAVENOUS at 23:10

## 2024-05-10 ENCOUNTER — APPOINTMENT (OUTPATIENT)
Dept: CT IMAGING | Facility: HOSPITAL | Age: 64
End: 2024-05-10
Payer: MEDICARE

## 2024-05-10 ENCOUNTER — APPOINTMENT (OUTPATIENT)
Dept: CARDIOLOGY | Facility: HOSPITAL | Age: 64
End: 2024-05-10
Payer: MEDICARE

## 2024-05-10 LAB
ANION GAP SERPL CALCULATED.3IONS-SCNC: 13.7 MMOL/L (ref 5–15)
AORTIC DIMENSIONLESS INDEX: 0.6 (DI)
ASCENDING AORTA: 3.3 CM
BASOPHILS # BLD AUTO: 0.05 10*3/MM3 (ref 0–0.2)
BASOPHILS NFR BLD AUTO: 0.6 % (ref 0–1.5)
BH CV ECHO MEAS - ACS: 1.89 CM
BH CV ECHO MEAS - AO MAX PG: 14.2 MMHG
BH CV ECHO MEAS - AO MEAN PG: 6 MMHG
BH CV ECHO MEAS - AO ROOT DIAM: 3.3 CM
BH CV ECHO MEAS - AO V2 MAX: 188.7 CM/SEC
BH CV ECHO MEAS - AO V2 VTI: 31.6 CM
BH CV ECHO MEAS - AVA(I,D): 2.8 CM2
BH CV ECHO MEAS - EDV(CUBED): 122.4 ML
BH CV ECHO MEAS - EDV(MOD-SP2): 91 ML
BH CV ECHO MEAS - EDV(MOD-SP4): 110 ML
BH CV ECHO MEAS - EF(MOD-BP): 59.3 %
BH CV ECHO MEAS - EF(MOD-SP2): 58.2 %
BH CV ECHO MEAS - EF(MOD-SP4): 61.8 %
BH CV ECHO MEAS - ESV(CUBED): 38.1 ML
BH CV ECHO MEAS - ESV(MOD-SP2): 38 ML
BH CV ECHO MEAS - ESV(MOD-SP4): 42 ML
BH CV ECHO MEAS - FS: 32.2 %
BH CV ECHO MEAS - IVS/LVPW: 1.02 CM
BH CV ECHO MEAS - IVSD: 1.34 CM
BH CV ECHO MEAS - LAT PEAK E' VEL: 12.3 CM/SEC
BH CV ECHO MEAS - LV MASS(C)D: 266.6 GRAMS
BH CV ECHO MEAS - LV MAX PG: 5.6 MMHG
BH CV ECHO MEAS - LV MEAN PG: 2.39 MMHG
BH CV ECHO MEAS - LV V1 MAX: 118.5 CM/SEC
BH CV ECHO MEAS - LV V1 VTI: 20.2 CM
BH CV ECHO MEAS - LVIDD: 5 CM
BH CV ECHO MEAS - LVIDS: 3.4 CM
BH CV ECHO MEAS - LVOT AREA: 4.4 CM2
BH CV ECHO MEAS - LVOT DIAM: 2.36 CM
BH CV ECHO MEAS - LVPWD: 1.31 CM
BH CV ECHO MEAS - MED PEAK E' VEL: 11.9 CM/SEC
BH CV ECHO MEAS - MV DEC SLOPE: 537.1 CM/SEC2
BH CV ECHO MEAS - MV DEC TIME: 0.17 SEC
BH CV ECHO MEAS - MV E MAX VEL: 101 CM/SEC
BH CV ECHO MEAS - MV MAX PG: 6.2 MMHG
BH CV ECHO MEAS - MV MEAN PG: 2.44 MMHG
BH CV ECHO MEAS - MV P1/2T: 68.8 MSEC
BH CV ECHO MEAS - MV V2 VTI: 21.6 CM
BH CV ECHO MEAS - MVA(P1/2T): 3.2 CM2
BH CV ECHO MEAS - MVA(VTI): 4.1 CM2
BH CV ECHO MEAS - PA ACC TIME: 0.14 SEC
BH CV ECHO MEAS - PA V2 MAX: 66.5 CM/SEC
BH CV ECHO MEAS - PULM A REVS DUR: 0.11 SEC
BH CV ECHO MEAS - PULM A REVS VEL: 21.3 CM/SEC
BH CV ECHO MEAS - PULM DIAS VEL: 45.4 CM/SEC
BH CV ECHO MEAS - PULM S/D: 0.84
BH CV ECHO MEAS - PULM SYS VEL: 38.2 CM/SEC
BH CV ECHO MEAS - RV MAX PG: 2.5 MMHG
BH CV ECHO MEAS - RV V1 MAX: 79.5 CM/SEC
BH CV ECHO MEAS - RV V1 VTI: 14.4 CM
BH CV ECHO MEAS - SV(LVOT): 88.6 ML
BH CV ECHO MEAS - SV(MOD-SP2): 53 ML
BH CV ECHO MEAS - SV(MOD-SP4): 68 ML
BH CV ECHO MEASUREMENTS AVERAGE E/E' RATIO: 8.35
BH CV ECHO SHUNT ASSESSMENT PERFORMED (HIDDEN SCRIPTING): 1
BH CV XLRA - RV BASE: 2.7 CM
BH CV XLRA - RV LENGTH: 7.2 CM
BH CV XLRA - RV MID: 3.5 CM
BH CV XLRA - TDI S': 16.4 CM/SEC
BUN SERPL-MCNC: 12 MG/DL (ref 8–23)
BUN/CREAT SERPL: 10.8 (ref 7–25)
CALCIUM SPEC-SCNC: 8.9 MG/DL (ref 8.6–10.5)
CHLORIDE SERPL-SCNC: 101 MMOL/L (ref 98–107)
CHOLEST SERPL-MCNC: 233 MG/DL (ref 0–200)
CO2 SERPL-SCNC: 25.3 MMOL/L (ref 22–29)
CREAT SERPL-MCNC: 1.11 MG/DL (ref 0.76–1.27)
DEPRECATED RDW RBC AUTO: 43.1 FL (ref 37–54)
EGFRCR SERPLBLD CKD-EPI 2021: 73.7 ML/MIN/1.73
EOSINOPHIL # BLD AUTO: 0.23 10*3/MM3 (ref 0–0.4)
EOSINOPHIL NFR BLD AUTO: 2.6 % (ref 0.3–6.2)
ERYTHROCYTE [DISTWIDTH] IN BLOOD BY AUTOMATED COUNT: 12.7 % (ref 12.3–15.4)
GLUCOSE BLDC GLUCOMTR-MCNC: 108 MG/DL (ref 70–130)
GLUCOSE BLDC GLUCOMTR-MCNC: 124 MG/DL (ref 70–130)
GLUCOSE BLDC GLUCOMTR-MCNC: 125 MG/DL (ref 70–130)
GLUCOSE BLDC GLUCOMTR-MCNC: 129 MG/DL (ref 70–130)
GLUCOSE SERPL-MCNC: 117 MG/DL (ref 65–99)
HBA1C MFR BLD: 6.1 % (ref 4.8–5.6)
HCT VFR BLD AUTO: 45.7 % (ref 37.5–51)
HDLC SERPL-MCNC: 39 MG/DL (ref 40–60)
HGB BLD-MCNC: 15.7 G/DL (ref 13–17.7)
IMM GRANULOCYTES # BLD AUTO: 0.03 10*3/MM3 (ref 0–0.05)
IMM GRANULOCYTES NFR BLD AUTO: 0.3 % (ref 0–0.5)
LDLC SERPL CALC-MCNC: 166 MG/DL (ref 0–100)
LDLC/HDLC SERPL: 4.21 {RATIO}
LEFT ATRIUM VOLUME INDEX: 34.7 ML/M2
LYMPHOCYTES # BLD AUTO: 2.2 10*3/MM3 (ref 0.7–3.1)
LYMPHOCYTES NFR BLD AUTO: 24.5 % (ref 19.6–45.3)
MCH RBC QN AUTO: 32 PG (ref 26.6–33)
MCHC RBC AUTO-ENTMCNC: 34.4 G/DL (ref 31.5–35.7)
MCV RBC AUTO: 93.1 FL (ref 79–97)
MONOCYTES # BLD AUTO: 0.84 10*3/MM3 (ref 0.1–0.9)
MONOCYTES NFR BLD AUTO: 9.4 % (ref 5–12)
NEUTROPHILS NFR BLD AUTO: 5.63 10*3/MM3 (ref 1.7–7)
NEUTROPHILS NFR BLD AUTO: 62.6 % (ref 42.7–76)
NRBC BLD AUTO-RTO: 0 /100 WBC (ref 0–0.2)
PLATELET # BLD AUTO: 284 10*3/MM3 (ref 140–450)
PMV BLD AUTO: 10.3 FL (ref 6–12)
POTASSIUM SERPL-SCNC: 3.5 MMOL/L (ref 3.5–5.2)
POTASSIUM SERPL-SCNC: 4 MMOL/L (ref 3.5–5.2)
QT INTERVAL: 420 MS
QTC INTERVAL: 491 MS
RBC # BLD AUTO: 4.91 10*6/MM3 (ref 4.14–5.8)
SINUS: 3.5 CM
SODIUM SERPL-SCNC: 140 MMOL/L (ref 136–145)
STJ: 2.8 CM
TRIGL SERPL-MCNC: 150 MG/DL (ref 0–150)
VLDLC SERPL-MCNC: 28 MG/DL (ref 5–40)
WBC NRBC COR # BLD AUTO: 8.98 10*3/MM3 (ref 3.4–10.8)

## 2024-05-10 PROCEDURE — 25810000003 SODIUM CHLORIDE 0.9 % SOLUTION: Performed by: PHYSICIAN ASSISTANT

## 2024-05-10 PROCEDURE — 85025 COMPLETE CBC W/AUTO DIFF WBC: CPT

## 2024-05-10 PROCEDURE — 84132 ASSAY OF SERUM POTASSIUM: CPT | Performed by: INTERNAL MEDICINE

## 2024-05-10 PROCEDURE — 99222 1ST HOSP IP/OBS MODERATE 55: CPT | Performed by: INTERNAL MEDICINE

## 2024-05-10 PROCEDURE — 25010000002 POTASSIUM CHLORIDE 10 MEQ/100ML SOLUTION: Performed by: INTERNAL MEDICINE

## 2024-05-10 PROCEDURE — 92610 EVALUATE SWALLOWING FUNCTION: CPT

## 2024-05-10 PROCEDURE — 93306 TTE W/DOPPLER COMPLETE: CPT

## 2024-05-10 PROCEDURE — 83036 HEMOGLOBIN GLYCOSYLATED A1C: CPT

## 2024-05-10 PROCEDURE — 70450 CT HEAD/BRAIN W/O DYE: CPT

## 2024-05-10 PROCEDURE — 80048 BASIC METABOLIC PNL TOTAL CA: CPT

## 2024-05-10 PROCEDURE — 99222 1ST HOSP IP/OBS MODERATE 55: CPT | Performed by: STUDENT IN AN ORGANIZED HEALTH CARE EDUCATION/TRAINING PROGRAM

## 2024-05-10 PROCEDURE — 80061 LIPID PANEL: CPT

## 2024-05-10 PROCEDURE — 25010000002 NICARDIPINE 2.5 MG/ML SOLUTION: Performed by: PHYSICIAN ASSISTANT

## 2024-05-10 PROCEDURE — 82948 REAGENT STRIP/BLOOD GLUCOSE: CPT

## 2024-05-10 PROCEDURE — 99222 1ST HOSP IP/OBS MODERATE 55: CPT

## 2024-05-10 PROCEDURE — 25510000001 PERFLUTREN (DEFINITY) 8.476 MG IN SODIUM CHLORIDE (PF) 0.9 % 10 ML INJECTION: Performed by: STUDENT IN AN ORGANIZED HEALTH CARE EDUCATION/TRAINING PROGRAM

## 2024-05-10 PROCEDURE — 93306 TTE W/DOPPLER COMPLETE: CPT | Performed by: INTERNAL MEDICINE

## 2024-05-10 RX ORDER — AMOXICILLIN 250 MG
2 CAPSULE ORAL 2 TIMES DAILY
Status: DISCONTINUED | OUTPATIENT
Start: 2024-05-10 | End: 2024-05-15

## 2024-05-10 RX ORDER — SODIUM CHLORIDE 9 MG/ML
40 INJECTION, SOLUTION INTRAVENOUS AS NEEDED
Status: DISCONTINUED | OUTPATIENT
Start: 2024-05-10 | End: 2024-05-16 | Stop reason: HOSPADM

## 2024-05-10 RX ORDER — ONDANSETRON 2 MG/ML
4 INJECTION INTRAMUSCULAR; INTRAVENOUS EVERY 6 HOURS PRN
Status: DISCONTINUED | OUTPATIENT
Start: 2024-05-10 | End: 2024-05-16 | Stop reason: HOSPADM

## 2024-05-10 RX ORDER — POLYETHYLENE GLYCOL 3350 17 G/17G
17 POWDER, FOR SOLUTION ORAL DAILY PRN
Status: DISCONTINUED | OUTPATIENT
Start: 2024-05-10 | End: 2024-05-15

## 2024-05-10 RX ORDER — FLUTICASONE PROPIONATE 50 MCG
2 SPRAY, SUSPENSION (ML) NASAL DAILY
Status: DISCONTINUED | OUTPATIENT
Start: 2024-05-10 | End: 2024-05-16 | Stop reason: HOSPADM

## 2024-05-10 RX ORDER — BISACODYL 5 MG/1
5 TABLET, DELAYED RELEASE ORAL DAILY PRN
Status: DISCONTINUED | OUTPATIENT
Start: 2024-05-10 | End: 2024-05-15

## 2024-05-10 RX ORDER — POTASSIUM CHLORIDE 7.45 MG/ML
10 INJECTION INTRAVENOUS
Status: COMPLETED | OUTPATIENT
Start: 2024-05-10 | End: 2024-05-10

## 2024-05-10 RX ORDER — SODIUM CHLORIDE 0.9 % (FLUSH) 0.9 %
10 SYRINGE (ML) INJECTION AS NEEDED
Status: DISCONTINUED | OUTPATIENT
Start: 2024-05-10 | End: 2024-05-16 | Stop reason: HOSPADM

## 2024-05-10 RX ORDER — LABETALOL HYDROCHLORIDE 5 MG/ML
20 INJECTION, SOLUTION INTRAVENOUS
Status: DISCONTINUED | OUTPATIENT
Start: 2024-05-10 | End: 2024-05-16 | Stop reason: HOSPADM

## 2024-05-10 RX ORDER — BISACODYL 10 MG
10 SUPPOSITORY, RECTAL RECTAL DAILY PRN
Status: DISCONTINUED | OUTPATIENT
Start: 2024-05-10 | End: 2024-05-15

## 2024-05-10 RX ORDER — CARVEDILOL 3.12 MG/1
3.12 TABLET ORAL 2 TIMES DAILY WITH MEALS
Status: DISCONTINUED | OUTPATIENT
Start: 2024-05-10 | End: 2024-05-14

## 2024-05-10 RX ORDER — SODIUM CHLORIDE 0.9 % (FLUSH) 0.9 %
10 SYRINGE (ML) INJECTION EVERY 12 HOURS SCHEDULED
Status: DISCONTINUED | OUTPATIENT
Start: 2024-05-10 | End: 2024-05-16 | Stop reason: HOSPADM

## 2024-05-10 RX ORDER — ACETAMINOPHEN 325 MG/1
650 TABLET ORAL EVERY 4 HOURS PRN
Status: DISCONTINUED | OUTPATIENT
Start: 2024-05-10 | End: 2024-05-16 | Stop reason: HOSPADM

## 2024-05-10 RX ORDER — NITROGLYCERIN 0.4 MG/1
0.4 TABLET SUBLINGUAL
Status: DISCONTINUED | OUTPATIENT
Start: 2024-05-10 | End: 2024-05-16 | Stop reason: HOSPADM

## 2024-05-10 RX ORDER — ACETAMINOPHEN 650 MG/1
650 SUPPOSITORY RECTAL EVERY 4 HOURS PRN
Status: DISCONTINUED | OUTPATIENT
Start: 2024-05-10 | End: 2024-05-16 | Stop reason: HOSPADM

## 2024-05-10 RX ADMIN — NICARDIPINE HYDROCHLORIDE 12.5 MG/HR: 25 INJECTION, SOLUTION INTRAVENOUS at 22:00

## 2024-05-10 RX ADMIN — SENNOSIDES AND DOCUSATE SODIUM 2 TABLET: 50; 8.6 TABLET ORAL at 20:00

## 2024-05-10 RX ADMIN — Medication 10 ML: at 20:00

## 2024-05-10 RX ADMIN — NICARDIPINE HYDROCHLORIDE 7.5 MG/HR: 25 INJECTION, SOLUTION INTRAVENOUS at 10:24

## 2024-05-10 RX ADMIN — POTASSIUM CHLORIDE 10 MEQ: 7.46 INJECTION, SOLUTION INTRAVENOUS at 05:00

## 2024-05-10 RX ADMIN — POTASSIUM CHLORIDE 10 MEQ: 7.46 INJECTION, SOLUTION INTRAVENOUS at 04:00

## 2024-05-10 RX ADMIN — CARVEDILOL 3.12 MG: 3.12 TABLET, FILM COATED ORAL at 20:00

## 2024-05-10 RX ADMIN — Medication 10 ML: at 01:58

## 2024-05-10 RX ADMIN — NICARDIPINE HYDROCHLORIDE 12.5 MG/HR: 25 INJECTION, SOLUTION INTRAVENOUS at 04:00

## 2024-05-10 RX ADMIN — POTASSIUM CHLORIDE 10 MEQ: 7.46 INJECTION, SOLUTION INTRAVENOUS at 07:00

## 2024-05-10 RX ADMIN — NICARDIPINE HYDROCHLORIDE 7.5 MG/HR: 25 INJECTION, SOLUTION INTRAVENOUS at 20:00

## 2024-05-10 RX ADMIN — PERFLUTREN 3 ML: 6.52 INJECTION, SUSPENSION INTRAVENOUS at 15:20

## 2024-05-10 RX ADMIN — POTASSIUM CHLORIDE 10 MEQ: 7.46 INJECTION, SOLUTION INTRAVENOUS at 06:00

## 2024-05-10 RX ADMIN — NICARDIPINE HYDROCHLORIDE 7.5 MG/HR: 25 INJECTION, SOLUTION INTRAVENOUS at 14:02

## 2024-05-10 RX ADMIN — CARVEDILOL 3.12 MG: 3.12 TABLET, FILM COATED ORAL at 14:05

## 2024-05-10 RX ADMIN — NICARDIPINE HYDROCHLORIDE 12.5 MG/HR: 25 INJECTION, SOLUTION INTRAVENOUS at 02:00

## 2024-05-10 RX ADMIN — FLUTICASONE PROPIONATE 2 SPRAY: 50 SPRAY, METERED NASAL at 13:07

## 2024-05-10 RX ADMIN — NICARDIPINE HYDROCHLORIDE 7.5 MG/HR: 25 INJECTION, SOLUTION INTRAVENOUS at 17:46

## 2024-05-10 NOTE — ED PROVIDER NOTES
MD ATTESTATION NOTE    The ALEJANDRO and I have discussed this patient's history, physical exam, and treatment plan.  I have reviewed the documentation and personally had a face to face interaction with the patient. I affirm the documentation and agree with the treatment and plan.  The attached note describes my personal findings.      I provided a substantive portion of the care of the patient.  I personally performed the physical exam in its entirety, and below are my findings.        Brief HPI: Patient presenting to the emergency department with right-sided weakness and facial droop, also speech difficulty.  Sudden onset at 830 tonight.    PHYSICAL EXAM  ED Triage Vitals [05/09/24 2216]   Temp Heart Rate Resp BP SpO2   -- 89 18 (!) 163/115 94 %      Temp src Heart Rate Source Patient Position BP Location FiO2 (%)   -- -- -- -- --         GENERAL: no acute distress  HENT: nares patent, right-sided facial droop  EYES: no scleral icterus  CV: regular rhythm, normal rate  RESPIRATORY: normal effort  ABDOMEN: soft  MUSCULOSKELETAL: no deformity  NEURO: alert, moves all extremities, follows commands, right arm and leg weakness with associated right-sided facial droop and dysarthria  PSYCH:  calm, cooperative  SKIN: warm, dry    Vital signs and nursing notes reviewed.        Plan: Patient presented emergency department with acute neurologic symptoms, concern for stroke.  Hypertensive on arrival, otherwise well-appearing, vitals otherwise stable.  Labs reassuring, CT demonstrating hemorrhagic stroke in the left basal ganglia.  Started on Cardene.  Discussed with neurosurgery, recommend ICU admission for additional management and evaluation of his symptoms.  Admitted to the ICU.    Critical care: 32 minutes excluding procedures, independent of ALEJANDRO      ED Course as of 05/09/24 2346   Thu May 09, 2024   2231 On initial evaluation patient had an NIH of 7 with aphasia, right facial droop, right arm and leg weakness.  Discussed the  patient with Dr. Kapadia, neurology.  Recommends CT head and CTAs but if Noncon is unremarkable and no contraindications, patient should be given TNK.  Went to the CT suite, images reviewed by myself, my independent interpretation is acute left-sided intracranial hemorrhage.  Call to neurosurgery, Cardene ordered for hypertension, notified neurology. [KA]   2240 Dr. Britt discussed the patient with Dr. Madsen of neurosurgery.  He recommends Cardene, systolic blood pressure less than 150 and admission to the ICU, he will consult [KA]   2249 I discussed the patient with Dr. Mclain, radiologist, CT head shows interval parenchymal hemorrhage on the left, ovoid, 5 cc volume [KA]   2257 Glucose(!): 138 [KA]   2257 Creatinine: 1.24 [KA]   2257 HS Troponin T: 17 [KA]   2315 I reassessed the patient, no change in condition, counseled him about his imaging results, blood pressure, plan for admission to the ICU, he is agreeable   [KA]   2329 I reassessed the patient, resting, no change in condition. Family now at the bedside, counseled on workup with his permission [KA]   2335 I discussed patient with KRYSTA Hendrix for the ICU including history presentation workup and she agrees to admit on behalf of Dr. Og [KA]      ED Course User Index  [KA] Pau Bowie PA-C   CT head interpreted myself:  Hemorrhagic lesion noted on the left    SHARED VISIT: This visit was performed by BOTH a physician and an APC. The substantive portion of the medical decision making was performed by this attesting physician who made or approved the management plan and takes responsibility for patient management. All studies in the APC note (if performed) were independently interpreted by me.        Inderjit Britt MD  05/09/24 6893

## 2024-05-10 NOTE — PROGRESS NOTES
"  Daily Progress Note.   [facility] INTENSIVE CARE  5/10/2024    Patient:  Name:  Semaj Narvaez  MRN:  [id number]  1960  63 y.o.  male         CC: ICH A-fib prior stroke   Interval History:  Admitted overnight with intercranial hemorrhage.  Neurosurgery consulted by ER requested ICU admission.  Patient awakens from sleep.  Denies any chest pain shortness of breath.  Denies any cough sputum production.  Does complain of weakness in his right side.        Physical Exam:  /92   Pulse 101   Temp 97.8 °F (36.6 °C) (Oral)   Resp 19   Ht 168.9 cm (66.5\")   Wt 134 kg (294 lb 15.6 oz)   SpO2 93%   BMI 46.90 kg/m²   Body mass index is 46.9 kg/m².    Intake/Output Summary (Last 24 hours) at 5/10/2024 0544  Last data filed at 5/10/2024 0400  Gross per 24 hour   Intake 100 ml   Output --   Net 100 ml     General appearance: Nontoxic, conversant   Eyes: anicteric sclerae, moist conjunctivae; no lidlag;    HENT: Atraumatic; oropharynx clear with moist mucous membranes    Neck: Large neck circumference limiting exam  Lungs: Distant breath sounds no wheeze no rhonchi, with normal respiratory effort and no intercostal retractions  CV: Irregular regular tachycardic, no rub   Abdomen: Soft, nontender; BS+ obese  Extremities: No peripheral edema or ext lad  Skin: WWP no diffuse visible rash  Psych: Appropriate affect, alert   Neuro: 0 out of 5 strength right upper extremity preserved otherwise speech intact appropriate mood affect awake    Data Review:  Notable Labs:  Results from last 7 days   Lab Units 05/10/24  0204 05/09/24 2218   WBC 10*3/mm3 8.98 7.79   HEMOGLOBIN g/dL 15.7 15.5   PLATELETS 10*3/mm3 284 255     Results from last 7 days   Lab Units 05/10/24  0204 05/09/24 2218   SODIUM mmol/L 140 143   POTASSIUM mmol/L 3.5 4.0   CHLORIDE mmol/L 101 104   CO2 mmol/L 25.3 26.0   BUN mg/dL 12 12   CREATININE mg/dL 1.11 1.24   GLUCOSE mg/dL 117* 138*   CALCIUM mg/dL 8.9 8.9   Estimated Creatinine " Clearance: 86.9 mL/min (by C-G formula based on SCr of 1.11 mg/dL).    Results from last 7 days   Lab Units 05/10/24  0204 05/09/24  2218   AST (SGOT) U/L  --  12   ALT (SGPT) U/L  --  16   PLATELETS 10*3/mm3 284 255             Imaging:  Reviewed chest images personally from past 3 days    ASSESSMENT  /  PLAN:  Acute intraparenchymal hemorrhage  Atrial fibrillation  Hypertension  Chronic kidney disease  History of multiple strokes with encephalomalacia       Bp control  Serial imaging  Serial neurochecks  ICH per stroke and nsy services  Await neurosurgery consultation note  Cardiology consulted overnight for A-fib management  Neurology consulted overnight await consultation  All issues new to me today.  Prior hospital course, labs and imaging reviewed.      Electronically signed by John Ellis MD, 05/10/24, 8:08 AM EDT.

## 2024-05-10 NOTE — CONSULTS
"Neurology Consult Note    Consult Date: 5/10/2024    Referring MD: Inderjit Britt MD    Reason for Consult I have been asked to see the patient in neurological consultation to render advice and opinion regarding right-sided weakness and right facial droop and slurred speech    Semaj Narvaez is a 63 y.o. male past medical history of hypertension hyperlipidemia atrial fibrillation not on chronic anticoagulation chronic kidney disease.  Patient presented to the ER on 5/9/2024 with acute onset right-sided weakness slurred speech, known normal was 8:30 PM blood pressure on arrival was systolic 170.  CT scan showed intraparenchymal hemorrhage and was admitted to the ICU for further observation.    Past Medical History:   Diagnosis Date    Cellulitis and abscess 06/13/2013    DDD (degenerative disc disease), lumbosacral 07/06/2012    Encounter for eye exam 2011    with dilation    Erectile dysfunction     GERD (gastroesophageal reflux disease) 05/31/2013    Hydradenitis 12/22/2008    suppurativa    Hyperlipidemia 05/31/2013    Hypertension     benign essential    Low back pain 05/31/2013    Pneumonia of left lung due to infectious organism 1/12/2017    Spider bite 06/26/2012    with pustular rash surrounding this bite heart deep abrasion       Exam  /92   Pulse 89   Temp 97.7 °F (36.5 °C) (Oral)   Resp 15   Ht 168.9 cm (66.5\")   Wt 134 kg (294 lb 15.6 oz)   SpO2 94%   BMI 46.90 kg/m²   Gen: NAD, vitals reviewed  Morbidly obese  MS: Lethargic and drowsy oriented x 2, normal comprehension and repetition and nonfluent speech is  CN: visual acuity grossly normal, PERRL, EOMI, mild facial droop, severe dysarthria dysarthria  Motor: Right upper extremity 2/5  Right lower extremities at least 3+/5  Left upper and left lower 4+/5    DATA:    Lab Results   Component Value Date    GLUCOSE 117 (H) 05/10/2024    CALCIUM 8.9 05/10/2024     05/10/2024    K 3.5 05/10/2024    CO2 25.3 05/10/2024     05/10/2024    " BUN 12 05/10/2024    CREATININE 1.11 05/10/2024    EGFRIFAFRI 74 12/21/2021    EGFRIFNONA 64 12/21/2021    BCR 10.8 05/10/2024    ANIONGAP 13.7 05/10/2024     Lab Results   Component Value Date    WBC 8.98 05/10/2024    HGB 15.7 05/10/2024    HCT 45.7 05/10/2024    MCV 93.1 05/10/2024     05/10/2024       Lab review:   Sodium 140  Creatinine 1.11  Blood glucose 117  Normal LFTs    A1c 6.1  TSH 2.38      WBC 8.98  Hemoglobin 15.7 and platelets 284    Imaging review:   CT head yesterday revealed  2.2 x1.8 x 2.5 cm left basal ganglia hemorrhage    Repeat CT head 5 10:24 AM stable hemorrhage    Diagnoses:  Left basal ganglia hemorrhage    Other medical problems  Atrial fibrillation not on anticoagulation at home  Hypertension  Hyperlipidemia  Previous chronic ischemic strokes    Pre-stroke MRS: 0  NIHSS: NIHSS:    Baseline  1-->Not alert: but arousable by minor stimulation to obey, answer, or respond  1-->Answers one question correctly  0-->Performs both tasks correctly  0=normal  0=No visual loss  1=Minor paralysis (flattened nasolabial fold, asymmetric on smiling)  0-->No drift: limb holds 90 (or 45) degrees for full 10 secs  3-->No effort against gravity: limb falls  0-->No drift: limb holds 90 (or 45) degrees for full 10 secs  2-->Some effort against gravity: limb cannot get to or maintain (if cued) 90 (or 45) degrees, drifts down to bed, but has some effort against gravity  0=Absent  1=Mild to moderate sensory loss; patient feels pinprick is less sharp or is dull on the affected side; there is a loss of superficial pain with pinprick but patient is aware He is being touched  1-->Mild-to-moderate aphasia: some obvious loss of fluency or facility of comprehension, without significant limitation on ideas expressed or form of expression. Reduction of speech and/or comprehension, however, makes conversation.  2=Severe; patient speech is so slurred as to be unintelligible in the absence of or our of  proportion to any dysphagia, or is mute/anarthric  0=No abnormality    Total score: 12    1.  Left basal ganglia hemorrhage  Etiology of the bleed most likely hypertensive  Systolic blood pressure goal less than 150  No aspirin or anticoagulation right now  SLP eval, passed swallow eval start him on oral blood pressure medications  Plan to get MRI brain and MRA head  Plan to send him to [facility] tomorrow morning    No family at bedside.  High risk for intubation and aspiration due to poor mental status.      MDM   Reviewed: Previous charts, nursing notes and vitals   Reviewed: Previous labs and CT scan    Interpretation: Labs and CT scan   Total time providing critical care is :30-74 minutes. This excluded time spent performing separately reportable procedures and services  Consults :Neurology/Stroke    Please note that portions of this note were completed with a voice recognition program.     Olivier Longoria MD  Neuro Hospitalist /Vascular Neurology.

## 2024-05-10 NOTE — CASE MANAGEMENT/SOCIAL WORK
Discharge Planning Assessment  [facility]     Patient Name: Semaj Narvaez  MRN: [id number]  Today's Date: 5/10/2024    Admit Date: 5/9/2024    Plan: pending clinical course   Discharge Needs Assessment       Row Name 05/10/24 1428       Living Environment    People in Home alone    Current Living Arrangements home    Primary Care Provided by self    Provides Primary Care For no one    Family Caregiver if Needed sibling(s)    Quality of Family Relationships helpful;involved;supportive       Transition Planning    Patient/Family Anticipates Transition to home with help/services;inpatient rehabilitation facility    Patient/Family Anticipated Services at Transition skilled nursing;home health care    Transportation Anticipated health plan transportation;family or friend will provide       Discharge Needs Assessment    Readmission Within the Last 30 Days no previous admission in last 30 days    Equipment Currently Used at Home none    Concerns to be Addressed discharge planning                   Discharge Plan       Row Name 05/10/24 1429       Plan    Plan pending clinical course    Patient/Family in Agreement with Plan yes    Plan Comments CCP spoke with patient's sister, Ilene, to discuss discharge planning. CCP role explained and facesheet verified. The patient lives alone and has 3 COLUM[employer]. He does not use DME. He is IADL's and still drives. No prior history of HH or SNF. CCP explained PT and OT are to see patient and CCP will follow for recommendations on discharge planning. Jurgen PRIEST CCP                  Continued Care and Services - Admitted Since 5/9/2024    No active coordination exists for this encounter.          Demographic Summary       Row Name 05/10/24 1428       General Information    Arrived From emergency department    Referral Source emergency department    Preferred Language English                   Functional Status       Row Name 05/10/24 1428       Functional Status    Usual Activity Tolerance good     Current Activity Tolerance fair       Functional Status, IADL    Medications independent    Meal Preparation independent    Housekeeping independent    Laundry independent    Shopping independent                   Psychosocial    No documentation.                  Abuse/Neglect    No documentation.                  Legal    No documentation.                  Substance Abuse    No documentation.                  Patient Forms    No documentation.                     Jurgen Clayton RN

## 2024-05-10 NOTE — PLAN OF CARE
Goal Outcome Evaluation:  Plan of Care Reviewed With: patient           Outcome Evaluation: Patient seen for clinical swallow assessment. Pt was awake and quick to respond during evaluation. ALEXY okayed swallow evaluation and HOB elevation. Oral mech exam revealed moderate-severe dysarthria. R facial droop. Soft palate elevation appeared adequate. Voice clear. Immediate cough with ice and nectar via spoon. No overt s/s of aspiration with honey via spoon/cup/straw or puree. Soft throat clearing as trials progressed with soft solids. No oral residue observed. SLP recs honey and puree, straws okay. Meds whole or crushed with puree. Hold PO if not alert. Will follow with VFSS d/t s/s of aspiration of aspiration and moderate-severe dysarthria. Pt at high risk for silent aspiration, but voice did remain clear throughout assessment.

## 2024-05-10 NOTE — ED NOTES
Nursing report ED to floor  Semaj Narvaez  63 y.o.  male    HPI :   Chief Complaint   Patient presents with    Extremity Weakness     Via ems, pt started having R sided leg and arm weakness at 2030. Pt also was having slurred speech, via ems. Team D called at 2214. Pt has 18g iv in L ac from ems.       Admitting doctor:   Nguyen Og MD    Admitting diagnosis:   The primary encounter diagnosis was Intracranial hemorrhage. A diagnosis of Hypertensive crisis was also pertinent to this visit.    Code status:   Current Code Status       Date Active Code Status Order ID Comments User Context       Prior            Allergies:   Lisinopril    Isolation:   No active isolations    Intake and Output  No intake or output data in the 24 hours ending 05/10/24 0002    Weight:       05/09/24  2302   Weight: (!) 138 kg (304 lb 14.3 oz)       Most recent vitals:   Vitals:    05/09/24 2352 05/09/24 2353 05/09/24 2356 05/09/24 2358   BP:   (!) 159/118    Pulse: 95  90 88   Resp:       Temp:  98 °F (36.7 °C)     TempSrc:  Oral     SpO2:       Weight:           Active LDAs/IV Access:   Lines, Drains & Airways       Active LDAs       Name Placement date Placement time Site Days    Peripheral IV Left Antecubital --  --  Antecubital  --                    Labs (abnormal labs have a star):   Labs Reviewed   COMPREHENSIVE METABOLIC PANEL - Abnormal; Notable for the following components:       Result Value    Glucose 138 (*)     All other components within normal limits    Narrative:     GFR Normal >60  Chronic Kidney Disease <60  Kidney Failure <15     PROTIME-INR - Normal   APTT - Normal   SINGLE HS TROPONIN T - Normal    Narrative:     High Sensitive Troponin T Reference Range:  <14.0 ng/L- Negative Female for AMI  <22.0 ng/L- Negative Male for AMI  >=14 - Abnormal Female indicating possible myocardial injury.  >=22 - Abnormal Male indicating possible myocardial injury.   Clinicians would have to utilize clinical acumen, EKG, Troponin, and  serial changes to determine if it is an Acute Myocardial Infarction or myocardial injury due to an underlying chronic condition.        CBC WITH AUTO DIFFERENTIAL - Normal   RAINBOW DRAW    Narrative:     The following orders were created for panel order [place] Draw.  Procedure                               Abnormality         Status                     ---------                               -----------         ------                     Green Top (Gel)[[id number]]                                  Final result               Lavender Top[[id number]]                                     Final result               Gold Top - SST[[id number]]                                   Final result               Light Blue Top[[id number]]                                   Final result                 Please view results for these tests on the individual orders.   POCT GLUCOSE FINGERSTICK   TYPE AND SCREEN   CBC AND DIFFERENTIAL    Narrative:     The following orders were created for panel order CBC & Differential.  Procedure                               Abnormality         Status                     ---------                               -----------         ------                     CBC Auto Differential[[id number]]        Normal              Final result                 Please view results for these tests on the individual orders.   GREEN TOP   LAVENDER TOP   GOLD TOP - SST   LIGHT BLUE TOP       EKG:   ECG 12 Lead Stroke Evaluation   Preliminary Result   HEART RATE= 82  bpm   RR Interval= 732  ms   NJ Interval=   ms   P Horizontal Axis=   deg   P Front Axis=   deg   QRSD Interval= 155  ms   QT Interval= 420  ms   QTcB= 491  ms   QRS Axis= -164  deg   T Wave Axis= -6  deg   - ABNORMAL ECG -   Atrial fibrillation   RBBB and LPFB   Inferior infarct, age indeterminate   Electronically Signed By:    Date and Time of Study: 2024-05-09 22:55:03          Meds given in ED:   Medications   sodium chloride 0.9 % flush 10 mL (has no  administration in time range)   niCARdipine (CARDENE) 25 mg in 250 mL NS infusion kit (10 mg/hr Intravenous Rate/Dose Change 5/9/24 3172)       Imaging results:  CT Head Without Contrast    Result Date: 5/10/2024  1. There is an ovoid acute intraparenchymal hemorrhage measuring 2.2 x 1.8 x 2.5 cm in maximal mediolateral, anterior posterior and craniocaudal dimensions for mean volume of acute intraparenchymal hemorrhage of 5 cc of intraparenchymal hemorrhage that extends from the posterior left putamen into the lateral fibers of the posterior limb left internal capsule and the medial fibers of the posterior aspect of left external capsule with a thin 2 mm thick band of surrounding edema. This has a good location for hypertensive hemorrhage and correlate clinically  2. There is mild-moderate small vessel disease in the cerebral white matter. There are several punched out areas of encephalomalacia consistent with small old infarcts including an 8 x 7 mm old infarct in the anterior body of the left side of the corpus callosum in the left NIRAV territory, 7 x 6 mm old left frontal white matter infarct and a 5 x 4 mm old right parietal white matter infarct in 6.4 mm old lacunar infarct in the anterior right putamen. The remainder of the head CT is within normal limits. The results of this head CT without contrast were communicated to Dr. Kapadia from stroke neurology by telephone on 05/09/2024 at 10:29 p.m. while the patient was still on her scanner and due to the the acute intraparenchymal hemorrhage no CTA or CTP was performed at this time.  AI analysis of LVO was utilized.  Radiation dose reduction techniques were utilized, including automated exposure control and exposure modulation based on body size.       XR Chest 1 View    Result Date: 5/9/2024  Electronically signed by Scot Toussaint M.D. on 05-09-24 at 2333     Ambulatory status:   -bed bound (needs assist)    Social issues:   Social History     Socioeconomic  History    Marital status: Single   Tobacco Use    Smoking status: Never    Smokeless tobacco: Never   Substance and Sexual Activity    Alcohol use: No    Drug use: No    Sexual activity: Not Currently       NIH Stroke Scale:  Interval: baseline    Scot Alfaro RN  05/10/24 00:02 EDT

## 2024-05-10 NOTE — PLAN OF CARE
Goal Outcome Evaluation:  Plan of Care Reviewed With: patient        Progress: no change    NIH 6. Pt still exhibits right sided weakness, right sided facial droop, & slurred speech. AM CT done & results pending. Pt reports no pain & on room air. Pt able to be weaned off of nicardipine drip and meeting SBP goal <150. Pt in controlled afib with possible BBB noted. Pt failed dysphagia screen & NPO until evaluated by SLP. No other events or changes over PM.

## 2024-05-10 NOTE — SIGNIFICANT NOTE
05/10/24 1600   OTHER   Discipline physical therapist   Rehab Time/Intention   Session Not Performed other (see comments)  (Waiting for Neuro consult in AM. In PM, pt getting an ECHO at bedside. PT will check back tomorrow.)   Recommendation   PT - Next Appointment 05/11/24

## 2024-05-10 NOTE — SIGNIFICANT NOTE
05/10/24 1451   OTHER   Discipline occupational therapist   Rehab Time/Intention   Session Not Performed other (see comments)  (pt in ICU, awaiting neuro/ALEXY clearance for OT eval. Sign on door reports HOB to remain at 30 degrees.)   Recommendation   OT - Next Appointment 05/11/24

## 2024-05-10 NOTE — PLAN OF CARE
Goal Outcome Evaluation:              Outcome Evaluation: Awaiting ALEXY clearance for HOB elevation and PO.

## 2024-05-10 NOTE — PROGRESS NOTES
Received request for stroke screening per [facility] Stroke Order set.  Rehab Adm Nurses will review periodically to see if full acute Rehab evaluation is appropriate.  If acute care staff feel patient is appropriate for full acute Rehab evaluation now--please call referral to [phone].   Thank you--Odalis Adams,  Rehab Adm. Coordinator

## 2024-05-10 NOTE — CONSULTS
Date of Consultation: 05/10/24    Referral Provider: Dr. Og.     Reason for Consultation: Atrial fibrillation.    Encounter Provider: Timothy Love MD    Group of Service: [facility]     Patient Name: Semaj Narvaez    :1960    Chief complaint: Right sided weakness and slurred speech.    History of Present Illness:      This is a very pleasant 63 year-old male with a past medical history significant for hyperlipidemia, hypertension, atrial fibrillation (not on chronic AC), and chronic kidney disease.    He presented to the emergency department on 2021 with complaints of right-sided weakness and slurred speech. His blood pressure when EMS arrived was 170/100 with a heart rate of 80. EMS brought him to the emergency department for concerns of stroke. A CT head without contrast revealed an avoid acute intraparenchymal hemorrhage that measure 2.2 x 1.8 x 2.5 cm. There were also several punched-out areas of encephalomalacia consistent with small old infarcts. The CT done this morning showed an increase in size of the intraparenchymal hemorrhage as well as some adjacent vasogenic edema.    I saw him, he was fairly somnolent, but would arouse and interact.  He denied any chest pain.  His heart rate has largely been controlled.  He was on Cardene for blood pressure control.    ECHO 2017  All left ventricular wall segments contract normally.  Left ventricular function is normal. Estimated EF = 67%.  Mild-to-moderate mitral valve regurgitation is present  Right ventricular cavity is mildly dilated.    Past Medical History:   Diagnosis Date    Cellulitis and abscess 2013    DDD (degenerative disc disease), lumbosacral 2012    Encounter for eye exam     with dilation    Erectile dysfunction     GERD (gastroesophageal reflux disease) 2013    Hydradenitis 2008    suppurativa    Hyperlipidemia 2013    Hypertension     benign essential    Low back pain  05/31/2013    Pneumonia of left lung due to infectious organism 1/12/2017    Spider bite 06/26/2012    with pustular rash surrounding this bite heart deep abrasion         Past Surgical History:   Procedure Laterality Date    HERNIA REPAIR           Allergies   Allergen Reactions    Lisinopril          No current facility-administered medications on file prior to encounter.     Current Outpatient Medications on File Prior to Encounter   Medication Sig Dispense Refill    aspirin  MG EC tablet Take 1 tablet by mouth Daily.  0    fluticasone (FLONASE) 50 MCG/ACT nasal spray 2 sprays into the nostril(s) as directed by provider Daily. 16 g 2    Garlic 10 MG capsule Take  by mouth.      metoprolol tartrate (LOPRESSOR) 25 MG tablet TAKE ONE TABLET BY MOUTH EVERY MORNING AND TAKE 1 1/2 tABLET IN THE EVENING (Patient taking differently: Take 1 tablet by mouth Every Evening. TAKE ONE TABLET BY MOUTH EVERY MORNING AND TAKE 1 1/2 tABLET IN THE EVENING) 45 tablet 0    Omega-3 Fatty Acids (FISH OIL) 1000 MG capsule capsule Take 1 capsule by mouth Daily With Breakfast.      omeprazole OTC (PriLOSEC OTC) 20 MG EC tablet Take 1 tablet by mouth Daily. 90 tablet 1    hydroCHLOROthiazide (HYDRODIURIL) 12.5 MG tablet Take 1 tablet by mouth Daily. 90 tablet 3    losartan (Cozaar) 50 MG tablet Take 1 tablet by mouth Daily. 90 tablet 2         Social History     Socioeconomic History    Marital status: Single   Tobacco Use    Smoking status: Never    Smokeless tobacco: Never   Vaping Use    Vaping status: Never Used   Substance and Sexual Activity    Alcohol use: No    Drug use: No    Sexual activity: Not Currently         Family History   Problem Relation Age of Onset    Heart disease Mother     Cancer Father     Hypertension Other     Lung disease Other        REVIEW OF SYSTEMS:   The patient was fairly somnolent, and a complete review of systems was not possible.     Objective:     Vitals:    05/10/24 1645 05/10/24 1700 05/10/24  "1730 05/10/24 1800   BP: 135/98 134/92 151/85 (!) 138/102   BP Location:       Patient Position:       Pulse: 67 75 75 86   Resp:       Temp:       TempSrc:       SpO2: 95%  97% 95%   Weight:       Height:         Body mass index is 47.48 kg/m².  Flowsheet Rows      Flowsheet Row First Filed Value   Admission Height 168.9 cm (66.5\") Documented at 05/10/2024 0200   Admission Weight 138 kg (304 lb 14.3 oz) Documented at 05/09/2024 2302             General:    Somnolent, arouses.                   Head:    Normocephalic, atraumatic.   Eyes:          Conjunctivae and sclerae normal, no icterus.   Throat:   No oral lesions, no thrush, oral mucosa moist.    Neck:   Supple, trachea midline.   Lungs:     Decreased breath sounds bilaterally.    Heart:    Irregularly irregular rhythm and normal rate.  No murmurs, gallops, or rubs noted.   Abdomen:     Soft, non-tender, non-distended, positive bowel sounds.    Extremities:   No clubbing, cyanosis, or edema.     Pulses:   Pulses palpable and equal bilaterally.    Skin:   No bleeding or rash.   Neuro:   Slurred speech.  Complete neurological exam was not performed.   Psychiatric:   Somnolent.         Lab Review:                Results from last 7 days   Lab Units 05/10/24  1419 05/10/24  0204   SODIUM mmol/L  --  140   POTASSIUM mmol/L 4.0 3.5   CHLORIDE mmol/L  --  101   CO2 mmol/L  --  25.3   BUN mg/dL  --  12   CREATININE mg/dL  --  1.11   GLUCOSE mg/dL  --  117*   CALCIUM mg/dL  --  8.9     Results from last 7 days   Lab Units 05/09/24  2218   HSTROP T ng/L 17     Results from last 7 days   Lab Units 05/10/24  0204   WBC 10*3/mm3 8.98   HEMOGLOBIN g/dL 15.7   HEMATOCRIT % 45.7   PLATELETS 10*3/mm3 284     Results from last 7 days   Lab Units 05/09/24  2218   INR  1.00   APTT seconds 26.1     Results from last 7 days   Lab Units 05/10/24  0204   CHOLESTEROL mg/dL 233*         Results from last 7 days   Lab Units 05/10/24  0204   CHOLESTEROL mg/dL 233*   TRIGLYCERIDES mg/dL " 150   HDL CHOL mg/dL 39*   LDL CHOL mg/dL 166*       EKG (reviewed by me personally):              Assessment:   1.  Acute left basal ganglia intraparenchymal hemorrhage  2.  Hypertensive emergency  3.  Persistent atrial fibrillation  4.  Bifascicular block (RBBB, LAFB)  5.  Morbid obesity, complicating all aspects of care  6.  History of previous strokes  7.  Hyperlipidemia    Plan:       The patient was still somnolent when I saw him.  He is in atrial fibrillation, and is not on systemic anticoagulation at baseline for unclear reasons.  He has a history of previous strokes, although in this situation, obviously, it was to his advantage not to be on anticoagulation.    His rate was controlled.  He is on carvedilol 3.125 mg twice daily currently, although it appears he is on metoprolol 25 mg in the morning and 37.5 mg in the evening at home.  He is currently on Cardene for blood pressure control with goals per neurosurgery.    I did review his echocardiogram.  His ejection fraction is normal.  There was no significant pathology.    Cardiology will continue to follow.  Thank you very much for this consult.    Kolby Love MD

## 2024-05-10 NOTE — H&P
Group: [facility]        HISTORY AND PHYSICAL    Patient Identification:  Semaj Narvaez  63 y.o.  male  1960  [id number]            CC: right sided weakness, slurred speech, hemorrhagic stroke    History of Present Illness:  Semaj Narvaez is a 63-year-old male with a past medical history of hyperlipidemia,  hypertension, atrial fibrillation (not on chronic anticoagulation), chronic kidney disease.    He presented to the emergency department on 5/9/2021 for acute right-sided weakness and slurred speech.  Also normal was 8:30 PM.  EMS was called due to concern for stroke.  Blood pressure on arrival of EMS was 170/100, heart rate in the 80s in atrial fibrillation.  Patient denies any shortness of breath, chest pain.  ED evaluation was largely unremarkable, except a CT head without contrast which revealed an ovoid acute intraparenchymal hemorrhage measuring 2.2 x 1.8 x 2.5 cm with a mean volume of acute intraparenchymal hemorrhage of 5 mL.  Additionally, several punched-out areas of encephalomalacia consistent with small old infarcts including an 8 x 7 old infarct in the anterior body of the left side of the corpus callosum in the left NIRAV territory 6 mm old left frontal white matter infarct, and a 5 x 4 mm old right parietal white matter infarct.    Review of Systems:  CONSTITUTIONAL:  Denies fevers or chills  EYE:  No new vision changes  EAR:  No change in hearing  CARDIAC:  No chest pain  PULMONARY:  No productive cough or shortness of breath  GI:  No diarrhea, hematemesis or hematochezia  RENAL:  No dysuria or urinary frequency  MUSCULOSKELETAL:  No musculoskeletal complaints  ENDOCRINE:  No heat or cold intolerance  INTEGUMENTARY: No skin rashes  NEUROLOGICAL:  No dizziness or confusion.  No seizure activity.  Positive for right-sided weakness and speech disturbance.  PSYCHIATRIC:  No new anxiety or depression  12 system review of systems performed and all else negative     Past Medical  History:  Past Medical History:   Diagnosis Date    Cellulitis and abscess 06/13/2013    DDD (degenerative disc disease), lumbosacral 07/06/2012    Encounter for eye exam 2011    with dilation    Erectile dysfunction     GERD (gastroesophageal reflux disease) 05/31/2013    Hydradenitis 12/22/2008    suppurativa    Hyperlipidemia 05/31/2013    Hypertension     benign essential    Low back pain 05/31/2013    Pneumonia of left lung due to infectious organism 1/12/2017    Spider bite 06/26/2012    with pustular rash surrounding this bite heart deep abrasion       Past Surgical History:  Past Surgical History:   Procedure Laterality Date    HERNIA REPAIR          Home Meds:  (Not in a hospital admission)      Allergies:  Allergies   Allergen Reactions    Lisinopril        Social History:   Social History     Socioeconomic History    Marital status: Single   Tobacco Use    Smoking status: Never    Smokeless tobacco: Never   Substance and Sexual Activity    Alcohol use: No    Drug use: No    Sexual activity: Not Currently       Family History:  Family History   Problem Relation Age of Onset    Heart disease Mother     Cancer Father     Hypertension Other     Lung disease Other        Physical Exam:  BP (!) 149/109   Pulse 85   Resp 18   Wt (!) 138 kg (304 lb 14.3 oz)   SpO2 95%   BMI 52.70 kg/m²  Body mass index is 52.7 kg/m². 95% (!) 138 kg (304 lb 14.3 oz)  Constitutional: Middle aged, morbidly obese male pt in bed, No acute respiratory distress, no accessory muscle use  Head: - NCAT  Eyes: No pallor, Anicteric conjunctiva, EOMI.  ENMT:  Mallampati 3, no oral thrush. Dry MM..  NECK: Trachea midline, No thyromegaly, no palpable cervical lymphadenopathy  Heart: Regular rate, irregular rhythm, no murmur. No pedal edema   Lungs: VINITA +, No wheezes/ crackles heard, exam hindered secondary to body habitus sore  Abdomen: Soft, obese. No tenderness, guarding or rigidity. No palpable masses  Extremities: Extremities warm and  "well perfused. No cyanosis/ clubbing  Neuro: Conscious, answers appropriately, strength 0 out of 5 right upper extremity, 5 out of 5 on right lower, left upper and left lower, dysarthria present with mild right-sided facial droop  Psych: Mood and affect appropriate    PPE recommended per [facility] infectious disease Isolation protocol for the current clinical scenario(as mentioned below) was followed.      LABS:  No results found for: \"COVID19\"    Lab Results   Component Value Date    CALCIUM 8.9 05/09/2024     Results from last 7 days   Lab Units 05/09/24  2218   SODIUM mmol/L 143   POTASSIUM mmol/L 4.0   CHLORIDE mmol/L 104   CO2 mmol/L 26.0   BUN mg/dL 12   CREATININE mg/dL 1.24   GLUCOSE mg/dL 138*   CALCIUM mg/dL 8.9   WBC 10*3/mm3 7.79   HEMOGLOBIN g/dL 15.5   PLATELETS 10*3/mm3 255   ALT (SGPT) U/L 16   AST (SGOT) U/L 12     Lab Results   Component Value Date    TROPONINT 17 05/09/2024     Results from last 7 days   Lab Units 05/09/24  2218   HSTROP T ng/L 17                     Results from last 7 days   Lab Units 05/09/24  2218   INR  1.00         Lab Results   Component Value Date    TSH 2.380 04/18/2024     Estimated Creatinine Clearance: 75.9 mL/min (by C-G formula based on SCr of 1.24 mg/dL).         Imaging: I personally visualized the images of scans/x-rays performed within last 3 days.      Assessment:  Acute intraparenchymal hemorrhage  Atrial fibrillation  Hypertension  Chronic kidney disease  History of multiple strokes with encephalomalacia    Recommendations:  Acute intraparenchymal hemorrhage  CT head showed acute intraparenchymal hemorrhage measuring 2.2 x 1.8 x 2.5 cm.  Neurosurgery and neurology consulted.  Keep SBP less than 150, as needed labetalol and nicardipine drip available to maintain goal.  Repeat CT head without contrast in the morning.    Hypertension  Blood pressure upon EMS arrival was 170/100.    Home medications: Hydrochlorothiazide, losartan, metoprolol tartrate- " hold.  Using nicardipine and PRN labetalol to maintain goal SBP < 150.    Atrial fibrillation  Metoprolol tartrate ordered for rate control-currently rate controlled.  Patient becomes tachycardic, may initiate scheduled IV metoprolol pushes while patient is NPO.  Not on chronic anticoagulation at baseline.     Chronic kidney disease  Creatinine 124, BUN 12.     History of multiple strokes with encephalomalacia  CT head showed mild to moderate small vessel disease and cerebral white matter and several punched-out areas of encephalomalacia consistent with small old infarcts.  Patient appears to be on 325 aspirin daily, currently on hold.    NPO  Full code  SCDs    Patient was placed in face mask upon entering room and kept mask on throughout our encounter. I wore full protective equipment throughout this patient encounter including a face mask, gown and gloves. Hand hygiene was performed before donning protective equipment and after removal when leaving the room.    Isatu Smith, APRN  5/9/2024  23:36 EDT      Much of this encounter note is an electronic transcription/translation of spoken language to printed text using Dragon Software.
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
## 2024-05-10 NOTE — CONSULTS
met with pt and pt's sister Ade at bedside to discuss Advance Directive. AD completed and placed in file. Pt also requested prayer; prayer support provided. Chaplains remain available.

## 2024-05-10 NOTE — CONSULTS
"[facility] NEUROSURGERY CONSULT NOTE    Patient name: Semaj Narvaez  Referring Provider: Pau Bowie PA-C   Reason for Consultation: \"head bleed\"    Patient Care Team:  Jessica Mishra APRN as PCP - General (Nurse Practitioner)    Chief complaint: Facial droop, speech difficulty, right-sided weakness    Subjective .     History of present illness:    Patient is a 63 y.o.  male with hyperlipidemia, hypertension, A-fib not on anticoagulation, chronic kidney disease who reports to ED on 5/9/2024 with right-sided weakness, slurred speech and right facial droop.  Last known normal was 8:30 PM last night.  When he arrived to the ED blood pressure was noted to be 170/100.  CT of the head showed left intraparenchymal hemorrhage in the basal ganglia.  Currently he does not report headache, denies vision change, numbness or tingling.  He continues to have right upper and lower extremity weakness, slurred speech as well as some mild right facial droop.  Review of Systems  Review of Systems   Eyes:  Negative for visual disturbance.   Musculoskeletal:  Positive for gait problem.   Neurological:  Positive for facial asymmetry and weakness. Negative for numbness.   Psychiatric/Behavioral:  Negative for confusion.        History  PAST MEDICAL HISTORY  Past Medical History:   Diagnosis Date    Cellulitis and abscess 06/13/2013    DDD (degenerative disc disease), lumbosacral 07/06/2012    Encounter for eye exam 2011    with dilation    Erectile dysfunction     GERD (gastroesophageal reflux disease) 05/31/2013    Hydradenitis 12/22/2008    suppurativa    Hyperlipidemia 05/31/2013    Hypertension     benign essential    Low back pain 05/31/2013    Pneumonia of left lung due to infectious organism 1/12/2017    Spider bite 06/26/2012    with pustular rash surrounding this bite heart deep abrasion       PAST SURGICAL HISTORY  Past Surgical History:   Procedure Laterality Date    HERNIA REPAIR         FAMILY HISTORY  Family History "   Problem Relation Age of Onset    Heart disease Mother     Cancer Father     Hypertension Other     Lung disease Other        SOCIAL HISTORY  Social History     Tobacco Use    Smoking status: Never    Smokeless tobacco: Never   Vaping Use    Vaping status: Never Used   Substance Use Topics    Alcohol use: No    Drug use: No         Allergies:  Lisinopril    MEDICATIONS:  Medications Prior to Admission   Medication Sig Dispense Refill Last Dose    aspirin  MG EC tablet Take 1 tablet by mouth Daily.  0 5/9/2024    fluticasone (FLONASE) 50 MCG/ACT nasal spray 2 sprays into the nostril(s) as directed by provider Daily. 16 g 2 5/9/2024    Garlic 10 MG capsule Take  by mouth.   5/9/2024    metoprolol tartrate (LOPRESSOR) 25 MG tablet TAKE ONE TABLET BY MOUTH EVERY MORNING AND TAKE 1 1/2 tABLET IN THE EVENING (Patient taking differently: Take 1 tablet by mouth Every Evening. TAKE ONE TABLET BY MOUTH EVERY MORNING AND TAKE 1 1/2 tABLET IN THE EVENING) 45 tablet 0 Patient Taking Differently    Omega-3 Fatty Acids (FISH OIL) 1000 MG capsule capsule Take 1 capsule by mouth Daily With Breakfast.   Past Month    omeprazole OTC (PriLOSEC OTC) 20 MG EC tablet Take 1 tablet by mouth Daily. 90 tablet 1 5/9/2024    hydroCHLOROthiazide (HYDRODIURIL) 12.5 MG tablet Take 1 tablet by mouth Daily. 90 tablet 3 Unknown    losartan (Cozaar) 50 MG tablet Take 1 tablet by mouth Daily. 90 tablet 2 Unknown       Objective     Results Review:  LABS:    Admission on 05/09/2024   Component Date Value Ref Range Status    QT Interval 05/09/2024 420  ms Preliminary    QTC Interval 05/09/2024 491  ms Preliminary    Glucose 05/09/2024 138 (H)  65 - 99 mg/dL Final    BUN 05/09/2024 12  8 - 23 mg/dL Final    Creatinine 05/09/2024 1.24  0.76 - 1.27 mg/dL Final    Sodium 05/09/2024 143  136 - 145 mmol/L Final    Potassium 05/09/2024 4.0  3.5 - 5.2 mmol/L Final    Slight hemolysis detected by analyzer. Result may be falsely elevated.    Chloride  05/09/2024 104  98 - 107 mmol/L Final    CO2 05/09/2024 26.0  22.0 - 29.0 mmol/L Final    Calcium 05/09/2024 8.9  8.6 - 10.5 mg/dL Final    Total Protein 05/09/2024 6.5  6.0 - 8.5 g/dL Final    Albumin 05/09/2024 4.3  3.5 - 5.2 g/dL Final    ALT (SGPT) 05/09/2024 16  1 - 41 U/L Final    AST (SGOT) 05/09/2024 12  1 - 40 U/L Final    Alkaline Phosphatase 05/09/2024 80  39 - 117 U/L Final    Total Bilirubin 05/09/2024 0.3  0.0 - 1.2 mg/dL Final    Globulin 05/09/2024 2.2  gm/dL Final    A/G Ratio 05/09/2024 2.0  g/dL Final    BUN/Creatinine Ratio 05/09/2024 9.7  7.0 - 25.0 Final    Anion Gap 05/09/2024 13.0  5.0 - 15.0 mmol/L Final    eGFR 05/09/2024 64.5  >60.0 mL/min/1.73 Final    Protime 05/09/2024 13.4  11.7 - 14.2 Seconds Final    INR 05/09/2024 1.00  0.90 - 1.10 Final    PTT 05/09/2024 26.1  22.7 - 35.4 seconds Final    HS Troponin T 05/09/2024 17  <22 ng/L Final    ABO Type 05/09/2024 AB   Final    RH type 05/09/2024 Positive   Final    Antibody Screen 05/09/2024 Negative   Final    T&S Expiration Date 05/09/2024 5/12/2024 11:59:59 PM   Final    Extra Tube 05/09/2024 Hold for add-ons.   Final    Auto resulted.    Extra Tube 05/09/2024 hold for add-on   Final    Auto resulted    Extra Tube 05/09/2024 Hold for add-ons.   Final    Auto resulted.    Extra Tube 05/09/2024 Hold for add-ons.   Final    Auto resulted    WBC 05/09/2024 7.79  3.40 - 10.80 10*3/mm3 Final    RBC 05/09/2024 4.80  4.14 - 5.80 10*6/mm3 Final    Hemoglobin 05/09/2024 15.5  13.0 - 17.7 g/dL Final    Hematocrit 05/09/2024 45.4  37.5 - 51.0 % Final    MCV 05/09/2024 94.6  79.0 - 97.0 fL Final    MCH 05/09/2024 32.3  26.6 - 33.0 pg Final    MCHC 05/09/2024 34.1  31.5 - 35.7 g/dL Final    RDW 05/09/2024 12.9  12.3 - 15.4 % Final    RDW-SD 05/09/2024 44.9  37.0 - 54.0 fl Final    MPV 05/09/2024 10.3  6.0 - 12.0 fL Final    Platelets 05/09/2024 255  140 - 450 10*3/mm3 Final    Neutrophil % 05/09/2024 62.0  42.7 - 76.0 % Final    Lymphocyte %  05/09/2024 23.7  19.6 - 45.3 % Final    Monocyte % 05/09/2024 9.4  5.0 - 12.0 % Final    Eosinophil % 05/09/2024 3.9  0.3 - 6.2 % Final    Basophil % 05/09/2024 0.6  0.0 - 1.5 % Final    Immature Grans % 05/09/2024 0.4  0.0 - 0.5 % Final    Neutrophils, Absolute 05/09/2024 4.83  1.70 - 7.00 10*3/mm3 Final    Lymphocytes, Absolute 05/09/2024 1.85  0.70 - 3.10 10*3/mm3 Final    Monocytes, Absolute 05/09/2024 0.73  0.10 - 0.90 10*3/mm3 Final    Eosinophils, Absolute 05/09/2024 0.30  0.00 - 0.40 10*3/mm3 Final    Basophils, Absolute 05/09/2024 0.05  0.00 - 0.20 10*3/mm3 Final    Immature Grans, Absolute 05/09/2024 0.03  0.00 - 0.05 10*3/mm3 Final    nRBC 05/09/2024 0.0  0.0 - 0.2 /100 WBC Final    Glucose 05/10/2024 124  70 - 130 mg/dL Final    Hemoglobin A1C 05/10/2024 6.10 (H)  4.80 - 5.60 % Final    Total Cholesterol 05/10/2024 233 (H)  0 - 200 mg/dL Final    Triglycerides 05/10/2024 150  0 - 150 mg/dL Final    HDL Cholesterol 05/10/2024 39 (L)  40 - 60 mg/dL Final    LDL Cholesterol  05/10/2024 166 (H)  0 - 100 mg/dL Final    VLDL Cholesterol 05/10/2024 28  5 - 40 mg/dL Final    LDL/HDL Ratio 05/10/2024 4.21   Final    Glucose 05/10/2024 117 (H)  65 - 99 mg/dL Final    BUN 05/10/2024 12  8 - 23 mg/dL Final    Creatinine 05/10/2024 1.11  0.76 - 1.27 mg/dL Final    Sodium 05/10/2024 140  136 - 145 mmol/L Final    Potassium 05/10/2024 3.5  3.5 - 5.2 mmol/L Final    Chloride 05/10/2024 101  98 - 107 mmol/L Final    CO2 05/10/2024 25.3  22.0 - 29.0 mmol/L Final    Calcium 05/10/2024 8.9  8.6 - 10.5 mg/dL Final    BUN/Creatinine Ratio 05/10/2024 10.8  7.0 - 25.0 Final    Anion Gap 05/10/2024 13.7  5.0 - 15.0 mmol/L Final    eGFR 05/10/2024 73.7  >60.0 mL/min/1.73 Final    WBC 05/10/2024 8.98  3.40 - 10.80 10*3/mm3 Final    RBC 05/10/2024 4.91  4.14 - 5.80 10*6/mm3 Final    Hemoglobin 05/10/2024 15.7  13.0 - 17.7 g/dL Final    Hematocrit 05/10/2024 45.7  37.5 - 51.0 % Final    MCV 05/10/2024 93.1  79.0 - 97.0 fL Final     MCH 05/10/2024 32.0  26.6 - 33.0 pg Final    MCHC 05/10/2024 34.4  31.5 - 35.7 g/dL Final    RDW 05/10/2024 12.7  12.3 - 15.4 % Final    RDW-SD 05/10/2024 43.1  37.0 - 54.0 fl Final    MPV 05/10/2024 10.3  6.0 - 12.0 fL Final    Platelets 05/10/2024 284  140 - 450 10*3/mm3 Final    Neutrophil % 05/10/2024 62.6  42.7 - 76.0 % Final    Lymphocyte % 05/10/2024 24.5  19.6 - 45.3 % Final    Monocyte % 05/10/2024 9.4  5.0 - 12.0 % Final    Eosinophil % 05/10/2024 2.6  0.3 - 6.2 % Final    Basophil % 05/10/2024 0.6  0.0 - 1.5 % Final    Immature Grans % 05/10/2024 0.3  0.0 - 0.5 % Final    Neutrophils, Absolute 05/10/2024 5.63  1.70 - 7.00 10*3/mm3 Final    Lymphocytes, Absolute 05/10/2024 2.20  0.70 - 3.10 10*3/mm3 Final    Monocytes, Absolute 05/10/2024 0.84  0.10 - 0.90 10*3/mm3 Final    Eosinophils, Absolute 05/10/2024 0.23  0.00 - 0.40 10*3/mm3 Final    Basophils, Absolute 05/10/2024 0.05  0.00 - 0.20 10*3/mm3 Final    Immature Grans, Absolute 05/10/2024 0.03  0.00 - 0.05 10*3/mm3 Final    nRBC 05/10/2024 0.0  0.0 - 0.2 /100 WBC Final    Glucose 05/10/2024 125  70 - 130 mg/dL Final       DIAGNOSTICS:      CT head without contrast 5/9/2024:  Shows intraparenchymal hemorrhage in the left basal ganglia measuring 2 x 2 x 1 0.8 x 2.5 cm.  Also noted are areas of old infarcts including an 8 x 7 mm old infarct in the anterior body of the left side of the corpus callosum on the left NIRAV territory.  There is also a 7 x 6 mm old left frontal white matter infarct and a 5 x 4 mm old right parietal white matter infarct and 6 x 4 mm old lacunar infarct in the anterior right putamen.      CT head without contrast 5/10/2024:  Shows previously noted left basal ganglia intraparenchymal hematoma with extension into the left frontal lobe.  It measures 1.6 x 2.7 x 2.6 cm which is slightly increased in size from yesterday's imaging.  Adjacent vasogenic edema is noted.      Results Review:   I reviewed the patient's new clinical results.  I  personally viewed and interpreted the patient's labs, imaging studies, medications and chart.    Vital Signs   Temp:  [97.7 °F (36.5 °C)-98.1 °F (36.7 °C)] 97.7 °F (36.5 °C)  Heart Rate:  [] 94  Resp:  [15-19] 15  BP: (112-180)/() 138/89    Physical Exam:  Physical Exam  Constitutional:       General: He is not in acute distress.     Appearance: Normal appearance. He is not ill-appearing, toxic-appearing or diaphoretic.   HENT:      Head: Normocephalic.      Nose: Nose normal.      Mouth/Throat:      Mouth: Mucous membranes are moist.      Pharynx: Oropharynx is clear.   Eyes:      Extraocular Movements: Extraocular movements intact.      Conjunctiva/sclera: Conjunctivae normal.      Pupils: Pupils are equal, round, and reactive to light.   Cardiovascular:      Rate and Rhythm: Normal rate.   Pulmonary:      Effort: Pulmonary effort is normal.   Musculoskeletal:      Cervical back: Normal range of motion.   Skin:     General: Skin is warm.   Neurological:      Mental Status: He is alert and oriented to person, place, and time.      Coordination: Finger-Nose-Finger Test and Heel to Shin Test (On left side) normal.   Psychiatric:         Mood and Affect: Mood normal.         Speech: Speech is slurred.         Behavior: Behavior normal.         Thought Content: Thought content normal.         Judgment: Judgment normal.       Neurologic Exam     Mental Status   Oriented to person, place, and time.   Follows 3 step commands.   Attention: normal. Concentration: normal.   Speech: slurred   Level of consciousness: alert  Knowledge: consistent with education.     Cranial Nerves     CN II   Visual fields full to confrontation.   Visual acuity: normal  Right visual field deficit: none  Left visual field deficit: none     CN III, IV, VI   Pupils are equal, round, and reactive to light.  Right pupil: Size: 4 mm. Shape: regular. Reactivity: brisk.   Left pupil: Size: 3 mm. Shape: regular. Reactivity: brisk.   CN III:  no CN III palsy  CN VI: no CN VI palsy  Nystagmus: none   Ophthalmoparesis: none  Upgaze: normal  Downgaze: normal    CN V   Facial sensation intact.   Right facial sensation deficit: none  Left facial sensation deficit: none    CN VII   Right facial weakness: Right-sided facial droop.    CN VIII   Hearing: intact    CN IX, X   CN IX normal.   CN X normal.     CN XI   Right sternocleidomastoid strength: weak  Left sternocleidomastoid strength: normal  Right trapezius strength: weak    CN XII   CN XII normal.     Motor Exam   Muscle bulk: normal  Overall muscle tone: normal  Right arm tone: normal  Left arm tone: normal  Right leg tone: normal  Left leg tone: normalLeft hand  strength 5/5, right hand  strength 3+/5.    Left plantarflexion/dorsiflexion 5/5  Right plantarflexion 4+/5, right dorsiflexion 3+/5    Right lower extremity drift.  Leg able to raise off bed but falls to bed before 5 seconds  Right upper extremity raise off bed but falls within a few seconds     Sensory Exam   Light touch normal.     Gait, Coordination, and Reflexes     Gait  Gait: (Gait deferred)    Coordination   Finger to nose coordination: normal  Heel to shin coordination: normal (On left side)Ataxia unable to test on right side due to weakness       Assessment & Plan       Intraparenchymal hemorrhage of brain      63-year-old male with history of hypertension who had acute onset dysarthria, right-sided weakness and right-sided facial weakness last night.  He was noted to be hypertensive and CT head later found left basal ganglia hemorrhage.  He continues to exhibit significant right-sided weakness, moderate dysarthria as well as some mild right facial weakness.  Recommend getting speech therapy to see him today.  Keep in ICU today and repeat CT head scan in AM.  Maintain SBP less than 150 and please call neurosurgery and/or order stat head CT for acute neurologic decline.    PLAN:     SBP less than 150  CT head in a.m.  Every hour  "neurochecks    I discussed the patient's findings and my recommendations with patient and Dr. Reyes.     During patient visit, I utilized appropriate personal protective equipment including mask and gloves.  Mask used was standard procedure mask. Appropriate PPE was worn during the entire visit.  Hand hygiene was completed before and after.     Stephan Mckoy, APRN  05/10/24  11:40 EDT    \"Dictated utilizing Dragon dictation\".    "
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
## 2024-05-11 ENCOUNTER — APPOINTMENT (OUTPATIENT)
Dept: GENERAL RADIOLOGY | Facility: HOSPITAL | Age: 64
End: 2024-05-11
Payer: MEDICARE

## 2024-05-11 ENCOUNTER — APPOINTMENT (OUTPATIENT)
Dept: CT IMAGING | Facility: HOSPITAL | Age: 64
End: 2024-05-11
Payer: MEDICARE

## 2024-05-11 LAB
ANION GAP SERPL CALCULATED.3IONS-SCNC: 9.8 MMOL/L (ref 5–15)
BACTERIA UR QL AUTO: ABNORMAL /HPF
BASOPHILS # BLD AUTO: 0.04 10*3/MM3 (ref 0–0.2)
BASOPHILS NFR BLD AUTO: 0.4 % (ref 0–1.5)
BILIRUB UR QL STRIP: NEGATIVE
BUN SERPL-MCNC: 10 MG/DL (ref 8–23)
BUN/CREAT SERPL: 12.5 (ref 7–25)
CALCIUM SPEC-SCNC: 8.7 MG/DL (ref 8.6–10.5)
CHLORIDE SERPL-SCNC: 105 MMOL/L (ref 98–107)
CLARITY UR: CLEAR
CO2 SERPL-SCNC: 24.2 MMOL/L (ref 22–29)
COLOR UR: YELLOW
CREAT SERPL-MCNC: 0.8 MG/DL (ref 0.76–1.27)
DEPRECATED RDW RBC AUTO: 42.5 FL (ref 37–54)
EGFRCR SERPLBLD CKD-EPI 2021: 98.2 ML/MIN/1.73
EOSINOPHIL # BLD AUTO: 0.34 10*3/MM3 (ref 0–0.4)
EOSINOPHIL NFR BLD AUTO: 3.5 % (ref 0.3–6.2)
ERYTHROCYTE [DISTWIDTH] IN BLOOD BY AUTOMATED COUNT: 12.6 % (ref 12.3–15.4)
GLUCOSE BLDC GLUCOMTR-MCNC: 113 MG/DL (ref 70–130)
GLUCOSE BLDC GLUCOMTR-MCNC: 114 MG/DL (ref 70–130)
GLUCOSE BLDC GLUCOMTR-MCNC: 117 MG/DL (ref 70–130)
GLUCOSE BLDC GLUCOMTR-MCNC: 99 MG/DL (ref 70–130)
GLUCOSE SERPL-MCNC: 109 MG/DL (ref 65–99)
GLUCOSE UR STRIP-MCNC: NEGATIVE MG/DL
HCT VFR BLD AUTO: 42.2 % (ref 37.5–51)
HGB BLD-MCNC: 14.8 G/DL (ref 13–17.7)
HGB UR QL STRIP.AUTO: ABNORMAL
HYALINE CASTS UR QL AUTO: ABNORMAL /LPF
IMM GRANULOCYTES # BLD AUTO: 0.04 10*3/MM3 (ref 0–0.05)
IMM GRANULOCYTES NFR BLD AUTO: 0.4 % (ref 0–0.5)
KETONES UR QL STRIP: NEGATIVE
LEUKOCYTE ESTERASE UR QL STRIP.AUTO: ABNORMAL
LYMPHOCYTES # BLD AUTO: 1.88 10*3/MM3 (ref 0.7–3.1)
LYMPHOCYTES NFR BLD AUTO: 19.6 % (ref 19.6–45.3)
MCH RBC QN AUTO: 32.5 PG (ref 26.6–33)
MCHC RBC AUTO-ENTMCNC: 35.1 G/DL (ref 31.5–35.7)
MCV RBC AUTO: 92.7 FL (ref 79–97)
MONOCYTES # BLD AUTO: 0.86 10*3/MM3 (ref 0.1–0.9)
MONOCYTES NFR BLD AUTO: 9 % (ref 5–12)
NEUTROPHILS NFR BLD AUTO: 6.43 10*3/MM3 (ref 1.7–7)
NEUTROPHILS NFR BLD AUTO: 67.1 % (ref 42.7–76)
NITRITE UR QL STRIP: NEGATIVE
NRBC BLD AUTO-RTO: 0 /100 WBC (ref 0–0.2)
PH UR STRIP.AUTO: <=5 [PH] (ref 5–8)
PLATELET # BLD AUTO: 280 10*3/MM3 (ref 140–450)
PMV BLD AUTO: 10.3 FL (ref 6–12)
POTASSIUM SERPL-SCNC: 3.6 MMOL/L (ref 3.5–5.2)
POTASSIUM SERPL-SCNC: 4.4 MMOL/L (ref 3.5–5.2)
PROT UR QL STRIP: NEGATIVE
RBC # BLD AUTO: 4.55 10*6/MM3 (ref 4.14–5.8)
RBC # UR STRIP: ABNORMAL /HPF
REF LAB TEST METHOD: ABNORMAL
SODIUM SERPL-SCNC: 139 MMOL/L (ref 136–145)
SP GR UR STRIP: 1.02 (ref 1–1.03)
SQUAMOUS #/AREA URNS HPF: ABNORMAL /HPF
UROBILINOGEN UR QL STRIP: ABNORMAL
WBC # UR STRIP: ABNORMAL /HPF
WBC NRBC COR # BLD AUTO: 9.59 10*3/MM3 (ref 3.4–10.8)

## 2024-05-11 PROCEDURE — 25010000002 LABETALOL 5 MG/ML SOLUTION

## 2024-05-11 PROCEDURE — 25810000003 SODIUM CHLORIDE 0.9 % SOLUTION: Performed by: PHYSICIAN ASSISTANT

## 2024-05-11 PROCEDURE — 85025 COMPLETE CBC W/AUTO DIFF WBC: CPT

## 2024-05-11 PROCEDURE — 84132 ASSAY OF SERUM POTASSIUM: CPT | Performed by: INTERNAL MEDICINE

## 2024-05-11 PROCEDURE — 70450 CT HEAD/BRAIN W/O DYE: CPT

## 2024-05-11 PROCEDURE — 87077 CULTURE AEROBIC IDENTIFY: CPT | Performed by: PSYCHIATRY & NEUROLOGY

## 2024-05-11 PROCEDURE — 99233 SBSQ HOSP IP/OBS HIGH 50: CPT | Performed by: PSYCHIATRY & NEUROLOGY

## 2024-05-11 PROCEDURE — 87086 URINE CULTURE/COLONY COUNT: CPT | Performed by: PSYCHIATRY & NEUROLOGY

## 2024-05-11 PROCEDURE — 25010000002 CEFTRIAXONE PER 250 MG: Performed by: INTERNAL MEDICINE

## 2024-05-11 PROCEDURE — 74230 X-RAY XM SWLNG FUNCJ C+: CPT

## 2024-05-11 PROCEDURE — 82948 REAGENT STRIP/BLOOD GLUCOSE: CPT

## 2024-05-11 PROCEDURE — 97530 THERAPEUTIC ACTIVITIES: CPT

## 2024-05-11 PROCEDURE — 97161 PT EVAL LOW COMPLEX 20 MIN: CPT

## 2024-05-11 PROCEDURE — 87186 SC STD MICRODIL/AGAR DIL: CPT | Performed by: PSYCHIATRY & NEUROLOGY

## 2024-05-11 PROCEDURE — 81001 URINALYSIS AUTO W/SCOPE: CPT | Performed by: PSYCHIATRY & NEUROLOGY

## 2024-05-11 PROCEDURE — 92611 MOTION FLUOROSCOPY/SWALLOW: CPT

## 2024-05-11 PROCEDURE — 80048 BASIC METABOLIC PNL TOTAL CA: CPT

## 2024-05-11 PROCEDURE — 97167 OT EVAL HIGH COMPLEX 60 MIN: CPT

## 2024-05-11 PROCEDURE — 99232 SBSQ HOSP IP/OBS MODERATE 35: CPT | Performed by: NURSE PRACTITIONER

## 2024-05-11 PROCEDURE — 99232 SBSQ HOSP IP/OBS MODERATE 35: CPT | Performed by: INTERNAL MEDICINE

## 2024-05-11 PROCEDURE — 25010000002 NICARDIPINE 2.5 MG/ML SOLUTION: Performed by: PHYSICIAN ASSISTANT

## 2024-05-11 RX ORDER — LOSARTAN POTASSIUM 50 MG/1
50 TABLET ORAL
Status: DISCONTINUED | OUTPATIENT
Start: 2024-05-11 | End: 2024-05-16 | Stop reason: HOSPADM

## 2024-05-11 RX ORDER — HYDROCHLOROTHIAZIDE 12.5 MG/1
12.5 TABLET ORAL DAILY
Status: DISCONTINUED | OUTPATIENT
Start: 2024-05-11 | End: 2024-05-16 | Stop reason: HOSPADM

## 2024-05-11 RX ORDER — AMLODIPINE BESYLATE 5 MG/1
5 TABLET ORAL
Status: DISCONTINUED | OUTPATIENT
Start: 2024-05-11 | End: 2024-05-11

## 2024-05-11 RX ORDER — POTASSIUM CHLORIDE 750 MG/1
40 TABLET, FILM COATED, EXTENDED RELEASE ORAL EVERY 4 HOURS
Status: COMPLETED | OUTPATIENT
Start: 2024-05-11 | End: 2024-05-11

## 2024-05-11 RX ORDER — AMLODIPINE BESYLATE 10 MG/1
10 TABLET ORAL
Status: DISCONTINUED | OUTPATIENT
Start: 2024-05-11 | End: 2024-05-16 | Stop reason: HOSPADM

## 2024-05-11 RX ADMIN — SENNOSIDES AND DOCUSATE SODIUM 2 TABLET: 50; 8.6 TABLET ORAL at 08:44

## 2024-05-11 RX ADMIN — POTASSIUM CHLORIDE 40 MEQ: 750 TABLET, EXTENDED RELEASE ORAL at 03:14

## 2024-05-11 RX ADMIN — LABETALOL HYDROCHLORIDE 20 MG: 5 INJECTION, SOLUTION INTRAVENOUS at 15:12

## 2024-05-11 RX ADMIN — BARIUM SULFATE 135 ML: 980 POWDER, FOR SUSPENSION ORAL at 09:13

## 2024-05-11 RX ADMIN — LOSARTAN POTASSIUM 50 MG: 50 TABLET, FILM COATED ORAL at 13:07

## 2024-05-11 RX ADMIN — Medication 10 ML: at 09:41

## 2024-05-11 RX ADMIN — Medication 10 ML: at 22:37

## 2024-05-11 RX ADMIN — CARVEDILOL 3.12 MG: 3.12 TABLET, FILM COATED ORAL at 08:11

## 2024-05-11 RX ADMIN — AMLODIPINE BESYLATE 10 MG: 10 TABLET ORAL at 16:54

## 2024-05-11 RX ADMIN — BARIUM SULFATE 1 TEASPOON(S): 0.6 CREAM ORAL at 09:13

## 2024-05-11 RX ADMIN — HYDROCHLOROTHIAZIDE 12.5 MG: 12.5 TABLET ORAL at 13:07

## 2024-05-11 RX ADMIN — POTASSIUM CHLORIDE 40 MEQ: 750 TABLET, EXTENDED RELEASE ORAL at 06:45

## 2024-05-11 RX ADMIN — CARVEDILOL 3.12 MG: 3.12 TABLET, FILM COATED ORAL at 16:54

## 2024-05-11 RX ADMIN — NICARDIPINE HYDROCHLORIDE 5 MG/HR: 25 INJECTION, SOLUTION INTRAVENOUS at 10:17

## 2024-05-11 RX ADMIN — NICARDIPINE HYDROCHLORIDE 15 MG/HR: 25 INJECTION, SOLUTION INTRAVENOUS at 00:00

## 2024-05-11 RX ADMIN — NICARDIPINE HYDROCHLORIDE 10 MG/HR: 25 INJECTION, SOLUTION INTRAVENOUS at 06:00

## 2024-05-11 RX ADMIN — CEFTRIAXONE 2000 MG: 2 INJECTION, POWDER, FOR SOLUTION INTRAMUSCULAR; INTRAVENOUS at 15:59

## 2024-05-11 RX ADMIN — BARIUM SULFATE 55 ML: 0.81 POWDER, FOR SUSPENSION ORAL at 09:13

## 2024-05-11 RX ADMIN — NICARDIPINE HYDROCHLORIDE 7.5 MG/HR: 25 INJECTION, SOLUTION INTRAVENOUS at 03:00

## 2024-05-11 RX ADMIN — FLUTICASONE PROPIONATE 2 SPRAY: 50 SPRAY, METERED NASAL at 08:40

## 2024-05-11 RX ADMIN — BARIUM SULFATE 50 ML: 400 SUSPENSION ORAL at 09:13

## 2024-05-11 NOTE — PROGRESS NOTES
LOS: 2 days   Patient Care Team:  Jessica Mishra APRN as PCP - General (Nurse Practitioner)    Chief Complaint: Follow-up hypertensive emergency, intraparenchymal hemorrhage.    Interval History: Doing better.  More alert.  No chest pain or shortness of breath.  Continues to have rate controlled atrial fibrillation.  Remains on Cardene.    Vital Signs:  Temp:  [97.4 °F (36.3 °C)-98.5 °F (36.9 °C)] 97.4 °F (36.3 °C)  Heart Rate:  [63-90] 66  Resp:  [15-20] 20  BP: (113-161)/() 135/87    Intake/Output Summary (Last 24 hours) at 5/11/2024 1159  Last data filed at 5/11/2024 1100  Gross per 24 hour   Intake 2410 ml   Output 1950 ml   Net 460 ml       Physical Exam:   General Appearance:    No acute distress, alert   Lungs:     Decreased breath sounds bilaterally.     Heart:     Irregularly irregular rhythm and normal rate.  No murmurs, gallops, or rubs noted.    Abdomen:     Soft, nontender, nondistended.    Extremities:   No clubbing, cyanosis, or edema.     Results Review:    Results from last 7 days   Lab Units 05/11/24  0210   SODIUM mmol/L 139   POTASSIUM mmol/L 3.6   CHLORIDE mmol/L 105   CO2 mmol/L 24.2   BUN mg/dL 10   CREATININE mg/dL 0.80   GLUCOSE mg/dL 109*   CALCIUM mg/dL 8.7     Results from last 7 days   Lab Units 05/09/24  2218   HSTROP T ng/L 17     Results from last 7 days   Lab Units 05/11/24  0210   WBC 10*3/mm3 9.59   HEMOGLOBIN g/dL 14.8   HEMATOCRIT % 42.2   PLATELETS 10*3/mm3 280     Results from last 7 days   Lab Units 05/09/24  2218   INR  1.00   APTT seconds 26.1     Results from last 7 days   Lab Units 05/10/24  0204   CHOLESTEROL mg/dL 233*         Results from last 7 days   Lab Units 05/10/24  0204   CHOLESTEROL mg/dL 233*   TRIGLYCERIDES mg/dL 150   HDL CHOL mg/dL 39*   LDL CHOL mg/dL 166*       I reviewed the patient's new clinical results.        Assessment:  1.  Acute left basal ganglia intraparenchymal hemorrhage  2.  Hypertensive emergency  3.  Persistent atrial  fibrillation  4.  Bifascicular block (RBBB, LAFB)  5.  Morbid obesity, complicating all aspects of care  6.  History of previous strokes  7.  Hyperlipidemia    Plan:  -Continue Cardene drip for now.  I am going to resume his HCTZ to 12.5 mg/day and his losartan at 50 mg/day.  Continue Coreg 3.125 mg twice daily for now.    -Atrial fibrillation rate is controlled.  On Coreg.    -Obviously, no anticoagulation currently.    -Agree with need for sleep referral as he very likely has undiagnosed obstructive sleep apnea.    -With regards to Watchman, he would still need to be on systemic anticoagulation for 45 days afterwards.  This can be evaluated further as an outpatient, although he would not be a candidate currently with an active brain bleed.    Timothy Love MD  05/11/24  11:59 EDT

## 2024-05-11 NOTE — PROGRESS NOTES
"DOS: 2024  NAME: Semaj Narvaez   : 1960  PCP: Jessica Mishra APRN  Chief Complaint   Patient presents with    Extremity Weakness     Via ems, pt started having R sided leg and arm weakness at . Pt also was having slurred speech, via ems. Team D called at . Pt has 18g iv in L ac from ems.       Chief complaint: right sided weakness  Subjective: Patient new to me today.  This is a 63-year-old man with a history of paroxysmal A-fib, hypertension, probable obstructive sleep apnea, morbid obesity, hyperlipidemia who presented to the hospital with right hemiparesis and was found to have a 2 cm left subcortical intracerebral hemorrhage.  Neurologically he has persistent right hemiparesis.  He had some early hematoma expansion but that seems to have stabilized on his most recent head CT.  He remains in the ICU on Cardene.    Objective:  Vital signs: BP (!) 133/108   Pulse 75   Temp 98.4 °F (36.9 °C) (Oral)   Resp 15   Ht 168 cm (66.14\")   Wt 133 kg (293 lb 14 oz)   SpO2 96%   BMI 47.23 kg/m²    Gen: NAD, vitals reviewed  MS: oriented x3, recent/remote memory intact, normal attention/concentration, language intact, no neglect.  CN: visual acuity grossly normal, PERRL, EOMI, right facial droop, moderate to severe dysarthria  Motor: 4/5 right upper and lower extremities, 5/5 left upper and lower extremities  Sensory: intact to light touch all 4 ext.    Laboratory results:  Lab Results   Component Value Date    GLUCOSE 109 (H) 2024    CALCIUM 8.7 2024     2024    K 3.6 2024    CO2 24.2 2024     2024    BUN 10 2024    CREATININE 0.80 2024    EGFRIFAFRI 74 2021    EGFRIFNONA 64 2021    BCR 12.5 2024    ANIONGAP 9.8 2024     Lab Results   Component Value Date    WBC 9.59 2024    HGB 14.8 2024    HCT 42.2 2024    MCV 92.7 2024     2024     Lab Results   Component Value Date    LDL " 166 (H) 05/10/2024     (H) 04/18/2024     (H) 05/22/2023         Lab 05/10/24  0204   HEMOGLOBIN A1C 6.10*        Review of labs: CBC, BMP unremarkable, , hemoglobin A1c 6.1%    Review and interpretation of imaging: I personally reviewed his initial head CT as well as his most recent follow-up head CT.  His a.m. head CT this morning shows stability compared to his most recent scan.  He has an approximately 2 cm left subcortical ICH.  Radiology report reviewed.    Diagnoses:  Hypertensive left subcortical intracerebral hemorrhage.  Essential hypertension  Probable obstructive sleep apnea  Paroxysmal atrial fibrillation  Mixed hyperlipidemia    Comment: Hypertensive basal ganglia hemorrhage.  Patient endorses history of hypertension and says he has been told he has sleep apnea.  Currently refusing MRI.  Neurologic status stable.    Plan:  1.  From a neurology standpoint he does not require any additional imaging unless there is further neurologic change.  I will cancel MRI and MRA.  Patient is refusing the studies in any case due to claustrophobia  2.  No aspirin or anticoagulation until cleared by neurosurgery.  Probably a good watchman candidate  3.  Needs sleep referral on discharge for probable ZORAN  4.  Maintain blood pressure <150. Ok to downgrade from ICU when off Cardene consistently  5.  PT/OT/ST. Anticipate acute rehab late next week    Management discussed with patient, nursing staff    High risk

## 2024-05-11 NOTE — PLAN OF CARE
Goal Outcome Evaluation:  Plan of Care Reviewed With: patient        Progress: no change  Outcome Evaluation: Pt seen for OT eval after admission 2/2 hypertensive urgency and L BG hemmorhage. Pt presents with R side weakness and dysarthria. Pt reporting he lives alone (sone sometimes there, but not regularly) in 1 story home and had no AD prior to admission. Pt stating mutliple times he is quite tired this morning and EOB activity deferred (also con't to have HOB </= 45 deg orders in chart). Pt is Dep x 2 for scooting towards HOB and presents with significant weakness in RUE leading to increased assist with all ADLs. OT will con't to follow for stated goals and recommend d/c to inpt rehab.      Anticipated Discharge Disposition (OT): inpatient rehabilitation facility
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
## 2024-05-11 NOTE — NURSING NOTE
Transported to Xray for swallow study.Cardene drip at 5mg. Tolerated procedure well. Pending recommendation to follow. Transferred back to ICU. Cardiology seeing pt along with Neuro Surgery. Pt also c/o burning with urination. UA and Culture sent to lab. Tye Sorto RN BSN

## 2024-05-11 NOTE — PLAN OF CARE
Goal Outcome Evaluation:              Outcome Evaluation: VFSS completed. See full note for details. SLP recommends soft, chopped (NO mixed) and nectar thick liquids. Medication whole with applesauce. Precautions: upright, small bites/sips slow rate, alternate solids with liquids. Allow for water 30 minutes before/after meals after oral care for free water protocol. SLP to follow for re-evaluation. May upgrade at bedside with improved attention and ability to utilize compensatory strategies.      Anticipated Discharge Disposition (SLP): unknown          SLP Swallowing Diagnosis: mild, oral dysphagia, pharyngeal dysphagia (05/11/24 8312)

## 2024-05-11 NOTE — PLAN OF CARE
Goal Outcome Evaluation:  Plan of Care Reviewed With: patient        Progress: no change     Pt neuro status slightly improved and NIH 5. Slurred speech & right side facial droop slightly improved. Right arm a bit stronger. AM Head CT done and results pending. Pt reports no pain. Pt continues to require nicardipine drip to achieve SBP goal <150 despite scheduled carvedilol dose being started. Remains in stable afib with BBB. Pt on 2L NC to help with desaturation while sleeping. Otherwise pt stable on room air. Pt voiding q1hr . Bladder scan done to check for urinary retention and bladder scan showed 0cc remaining s/p void. Per neuro- hold off on MRI d/t pt claustrophobia. Pt in good spirits and no other changes or events over PM.

## 2024-05-11 NOTE — PROGRESS NOTES
[facility], [place], operated by [facility] NEUROSURGERY INTRACRANIAL PROGRESS NOTE    PATIENT IDENTIFICATION:   Name:  Semaj Narvaez      MRN:  [id number]     63 y.o.  male               CC: Facial weakness, Right sided weakness, dysarthria       Subjective     Interval History:  No significant events overnight. Patient denies H/A.    ROS:  Constitutional: No fever, chills  HEENT: No headache, no vision changes, no tinnitus, no hearing difficulties  Neck: no stiffness  GI: No nausea, vomiting, + swallow difficulties  Neuro: No numbness, tingling, + right side weakness, + speech difficulties, + balance difficulties    Objective     Vital signs in last 24 hours:  Temp:  [97.4 °F (36.3 °C)-98.5 °F (36.9 °C)] 97.4 °F (36.3 °C)  Heart Rate:  [63-95] 75  Resp:  [15-19] 15  BP: (113-161)/() 133/108      Intake/Output this shift:  No intake/output data recorded.      Intake/Output last 3 shifts:  I/O last 3 completed shifts:  In: 3327 [P.O.:350; I.V.:2577; IV Piggyback:400]  Out: 2400 [Urine:2400]    LABS:  Results from last 7 days   Lab Units 05/11/24  0210 05/10/24  0204 05/09/24  2218   WBC 10*3/mm3 9.59 8.98 7.79   HEMOGLOBIN g/dL 14.8 15.7 15.5   HEMATOCRIT % 42.2 45.7 45.4   PLATELETS 10*3/mm3 280 284 255      Results from last 7 days   Lab Units 05/11/24  0210 05/10/24  1419 05/10/24  0204 05/09/24  2218   SODIUM mmol/L 139  --  140 143   POTASSIUM mmol/L 3.6 4.0 3.5 4.0   CHLORIDE mmol/L 105  --  101 104   CO2 mmol/L 24.2  --  25.3 26.0   BUN mg/dL 10  --  12 12   CREATININE mg/dL 0.80  --  1.11 1.24   CALCIUM mg/dL 8.7  --  8.9 8.9   BILIRUBIN mg/dL  --   --   --  0.3   ALK PHOS U/L  --   --   --  80   ALT (SGPT) U/L  --   --   --  16   AST (SGOT) U/L  --   --   --  12   GLUCOSE mg/dL 109*  --  117* 138*      Results from last 7 days   Lab Units 05/10/24  0204   CHOLESTEROL mg/dL 233*   TRIGLYCERIDES mg/dL 150   HDL CHOL mg/dL 39*   LDL CHOL mg/dL 166*      Results from last 7 days   Lab Units 05/10/24  0204   HEMOGLOBIN A1C % 6.10*       IMAGING STUDIES:  [facility] 5/11/24   Narrative & Impression   CT OF THE HEAD WITHOUT CONTRAST     HISTORY: Intraparenchymal hemorrhage     COMPARISON: May 10, 2024     TECHNIQUE: Axial CT imaging was obtained through the brain. No IV  contrast was administered.     FINDINGS:  Area of intraparenchymal hemorrhage within the left basal ganglia/corona  radiata measures 2.5 x 1.7 cm. This is stable when compared to prior  exam. There is some surrounding edema. There is no intraventricular  hemorrhage. No new areas of hemorrhage are seen. There is some  periventricular and deep white matter microangiopathic change, as well  as an old lacunar infarct within the right basal ganglia. There is no  midline shift. Additional old lacunar infarct is noted within the left  frontal corona radiata. Mucosal thickening is noted within the ethmoid  sinuses. Mucous retention cyst is noted within the right sphenoid sinus.     IMPRESSION:  No significant interval change.     Radiation dose reduction techniques were utilized, including automated  exposure control and exposure modulation based on body size.        This report was finalized on 5/11/2024 5:17 AM by Dr. Sherron Villa M.D on Workstation: [id number]       I personally viewed and interpreted the patient's chart.     Meds reviewed/changed: Yes  Coreg 3.125 mg BID  Flonase daily   HCTZ 12.5mg daily  Cozaar 50mg daily  Pericolace 8.6-50mg BID    Physical Exam:    General:   Awake, alert, oriented x3. Dysarthria noted   HEENT:    Atraumatic   Neck:    supple  CN II:    Visual fields full to confrontation  CN III IV VI:   Right upper quad vision deficit. Extraocular movements are full , PERRL   CN VII:   Facial weakness right   Motor:    LLE and LUE 5/5; Able to lift right leg off bed but unable to maintain; Unable to lift RUE. No fasciculations, rigidity, spasticity, or abnormal movements.  Station and Gait:  Gait not assessed   Coordination:  Finger-to-nose test shows no  dysmetria on left side.     Extremities:   Wearing SCD    Assessment & Plan     ASSESSMENT:      Intraparenchymal hemorrhage of brain    The patient is a 63 year old male who presented to the hospital with right hemiparesis and was found to have a 2 cm left subcortical intracerebral hemorrhage.     He is refusing MRI. Recommendations have been made for a sleep study. Today, he denies headache. He has right sided weakness, right facial weakness and dysarthria.     PLAN:   Keep SBP less than 150  CTH if neurological changes   Okay to TTF from ALEXY as long as blood pressure is maintained     I discussed the patient's findings and my recommendations with patient and Dr. Madsen.          LOS: 2 days       KRYSTA Trinidad  5/11/2024  10:15 EDT

## 2024-05-11 NOTE — PROGRESS NOTES
LOS: 2 days   Patient Care Team:  Jessica Mishra APRN as PCP - General (Nurse Practitioner)    Subjective     Patient me today picking up pulmonary critical care coverage from Dr. John Ellis had discussed with him last night and reviewed his another records.  Patient with intracranial hemorrhage A-fib  Patient clearly has some dysarthria he is complaining of some burning when he urinates he says it similar to prior urinary tract infection he had.  No chest pain no cough or shortness of breath.  No headache or nausea.  He cannot move his right arm very well and his right leg is weaker than his left  Review of Systems:          Objective     Vital Signs  Vital Sign Min/Max for last 24 hours  Temp  Min: 97.6 °F (36.4 °C)  Max: 98.5 °F (36.9 °C)   BP  Min: 112/76  Max: 180/102   Pulse  Min: 67  Max: 107   Resp  Min: 15  Max: 19   SpO2  Min: 88 %  Max: 98 %   Flow (L/min)  Min: 2  Max: 2   Weight  Min: 133 kg (293 lb 14 oz)  Max: 134 kg (295 lb 6.7 oz)        Ventilator/Non-Invasive Ventilation Settings (From admission, onward)      None                         Body mass index is 47.23 kg/m².  I/O last 3 completed shifts:  In: 3277 [P.O.:300; I.V.:2577; IV Piggyback:400]  Out: 1975 [Urine:1975]  No intake/output data recorded.        Physical Exam:  General Appearance: Obese white male he is resting in bed he does not appear in any acute distress  Eyes: Conjunctiva are clear and anicteric pupils are equal and reactive to light they are about 3 mm and extraocular muscles are intact  ENT: He has a little bit of a right facial droop tongue appears midline mucous membranes are a little dry he has a Mallampati type IV airway and nasal septum midline  Neck: Very short very obese skin his neck sticks out beyond his chin I do not palpate any adenopathy trachea appears midline  Lungs: Clear nonlabored symmetric expansion  Cardiac: Irregularly irregular heart rate mostly in the 70s no murmur   Abdomen: Soft  nontender no palpable hepatosplenomegaly or masses  : Not examined  Musculoskeletal: Grossly normal  Skin: Warm and dry no jaundice no petechiae  Neuro: He is alert and cooperative following commands and grossly intact  Extremities/P Vascular: No clubbing no cyanosis no edema palpable radial and dorsalis pedis pulses  MSE: Seems to be in pretty good spirits       Labs:  Results from last 7 days   Lab Units 05/11/24  0210 05/10/24  1419 05/10/24  0204 05/09/24  2218   GLUCOSE mg/dL 109*  --  117* 138*   SODIUM mmol/L 139  --  140 143   POTASSIUM mmol/L 3.6 4.0 3.5 4.0   CO2 mmol/L 24.2  --  25.3 26.0   CHLORIDE mmol/L 105  --  101 104   ANION GAP mmol/L 9.8  --  13.7 13.0   CREATININE mg/dL 0.80  --  1.11 1.24   BUN mg/dL 10  --  12 12   BUN / CREAT RATIO  12.5  --  10.8 9.7   CALCIUM mg/dL 8.7  --  8.9 8.9   ALK PHOS U/L  --   --   --  80   TOTAL PROTEIN g/dL  --   --   --  6.5   ALT (SGPT) U/L  --   --   --  16   AST (SGOT) U/L  --   --   --  12   BILIRUBIN mg/dL  --   --   --  0.3   ALBUMIN g/dL  --   --   --  4.3   GLOBULIN gm/dL  --   --   --  2.2     Estimated Creatinine Clearance: 119.4 mL/min (by C-G formula based on SCr of 0.8 mg/dL).      Results from last 7 days   Lab Units 05/11/24  0210 05/10/24  0204 05/09/24 2218   WBC 10*3/mm3 9.59 8.98 7.79   RBC 10*6/mm3 4.55 4.91 4.80   HEMOGLOBIN g/dL 14.8 15.7 15.5   HEMATOCRIT % 42.2 45.7 45.4   MCV fL 92.7 93.1 94.6   MCH pg 32.5 32.0 32.3   MCHC g/dL 35.1 34.4 34.1   RDW % 12.6 12.7 12.9   RDW-SD fl 42.5 43.1 44.9   MPV fL 10.3 10.3 10.3   PLATELETS 10*3/mm3 280 284 255   NEUTROPHIL % % 67.1 62.6 62.0   LYMPHOCYTE % % 19.6 24.5 23.7   MONOCYTES % % 9.0 9.4 9.4   EOSINOPHIL % % 3.5 2.6 3.9   BASOPHIL % % 0.4 0.6 0.6   IMM GRAN % % 0.4 0.3 0.4   NEUTROS ABS 10*3/mm3 6.43 5.63 4.83   LYMPHS ABS 10*3/mm3 1.88 2.20 1.85   MONOS ABS 10*3/mm3 0.86 0.84 0.73   EOS ABS 10*3/mm3 0.34 0.23 0.30   BASOS ABS 10*3/mm3 0.04 0.05 0.05   IMMATURE GRANS (ABS) 10*3/mm3 0.04  0.03 0.03   NRBC /100 WBC 0.0 0.0 0.0         Results from last 7 days   Lab Units 05/09/24 2218   HSTROP T ng/L 17                 Results from last 7 days   Lab Units 05/09/24 2218   INR  1.00     Microbiology Results (last 10 days)       ** No results found for the last 240 hours. **                carvedilol, 3.125 mg, Oral, BID With Meals  fluticasone, 2 spray, Each Nare, Daily  senna-docusate sodium, 2 tablet, Oral, BID  sodium chloride, 10 mL, Intravenous, Q12H      niCARdipine, 5-15 mg/hr, Last Rate: 10 mg/hr (05/11/24 0600)        Diagnostics:  CT Head Without Contrast    Result Date: 5/11/2024  CT OF THE HEAD WITHOUT CONTRAST  HISTORY: Intraparenchymal hemorrhage  COMPARISON: May 10, 2024  TECHNIQUE: Axial CT imaging was obtained through the brain. No IV contrast was administered.  FINDINGS: Area of intraparenchymal hemorrhage within the left basal ganglia/corona radiata measures 2.5 x 1.7 cm. This is stable when compared to prior exam. There is some surrounding edema. There is no intraventricular hemorrhage. No new areas of hemorrhage are seen. There is some periventricular and deep white matter microangiopathic change, as well as an old lacunar infarct within the right basal ganglia. There is no midline shift. Additional old lacunar infarct is noted within the left frontal corona radiata. Mucosal thickening is noted within the ethmoid sinuses. Mucous retention cyst is noted within the right sphenoid sinus.      No significant interval change.  Radiation dose reduction techniques were utilized, including automated exposure control and exposure modulation based on body size.   This report was finalized on 5/11/2024 5:17 AM by Dr. Sherron Villa M.D on Workstation: [id number]      Adult Transthoracic Echo Complete w/ Color, Spectral and Contrast if Necessary Per Protocol    Result Date: 5/10/2024    Left ventricular systolic function is normal. Left ventricular ejection fraction appears to be 61 -  65%.   Left ventricular wall thickness is consistent with mild concentric hypertrophy.   Left ventricular diastolic function was indeterminate.   The right ventricular cavity is mildly dilated. Normal right ventricular systolic function noted.   The left atrial cavity is moderately dilated.   No evidence of a patent foramen ovale. Saline test results are negative.   Insufficient TR velocity profile to estimate the right ventricular systolic pressure.   There is a trivial pericardial effusion.     CT Head Without Contrast    Result Date: 5/10/2024  EMERGENCY CT SCAN OF THE HEAD WITHOUT CONTRAST ON 05/09/2024  CLINICAL HISTORY: Acute stroke, acute onset right-sided weakness. Team D.  TECHNIQUE: Spiral CT images were obtained from the base of the skull to the vertex without intravenous contrast. The images were reformatted and submitted in 3 mm thick axial, sagittal and coronal CT sections with brain algorithm  COMPARISON: There are no prior head CTs from [facility] for comparison.  FINDINGS: There is an ovoid area of acute intraparenchymal hemorrhage that measures 2.2 x 1.8 x 2.5 cm in maximal mediolateral anterior posterior and craniocaudal dimension. It extends from the posterior left putamen into the lateral fibers of the posterior limb of the left internal capsule and into the posterior left external capsule. There is a minimal band of surrounding edema measuring 2 mm in thickness. It exerts no significant mass effect at this time. There is some patchy low-density extending from the periventricular into the subcortical white matter of the cerebral hemispheres consistent with mild-moderate small vessel disease. There is an 8 x 7 mm discrete punched-out area of low density involving the medial left frontal lobe extending in the anterior body of the left side of the corpus callosum consistent consistent with an old infarct, tiny 7 x 6 mm discrete punched out area of encephalomalacia 5 mm lateral  to the angle of the frontal horn of the left lateral ventricle within the left frontal white matter compatible with a tiny old infarct. There is a 6 x 4 mm old lacunar infarct in the anterior right putamen. There is a 5 x 4 mm old right parietal white matter infarct. The remainder of the brain parenchyma is normal in attenuation. The ventricles are normal in size. There is no midline shift. No extra-axial fluid collections are identified. There is no additional acute intracranial hemorrhage. The calvarium and the skull base are normal in appearance. The paranasal sinuses and the mastoid air cells and the middle ear cavities are clear.      1. There is an ovoid acute intraparenchymal hemorrhage measuring 2.2 x 1.8 x 2.5 cm in maximal mediolateral, anterior posterior and craniocaudal dimensions for a mean volume of acute intraparenchymal hemorrhage of 5 cc of intraparenchymal hemorrhage that extends from the posterior left putamen into the lateral fibers of the posterior limb left internal capsule and the medial fibers of the posterior aspect of the left external capsule with a thin 2 mm thick band of surrounding edema. This has a good location for hypertensive hemorrhage and correlate clinically  2. There is mild-moderate small vessel disease in the cerebral white matter. There are several punched out areas of encephalomalacia consistent with small old infarcts including an 8 x 7 mm old infarct in the anterior body of the left side of the corpus callosum in the left NIRAV territory, 7 x 6 mm old left frontal white matter infarct and a 5 x 4 mm old right parietal white matter infarct in 6 x 4 mm old lacunar infarct in the anterior right putamen. The remainder of the head CT is within normal limits. The results of this head CT without contrast were communicated to Dr. Kapadia from stroke neurology by telephone on 05/09/2024 at 10:29 p.m. while the patient was still on our scanner and due to the the acute  intraparenchymal hemorrhage no CTA or CTP was performed at this time.  AI analysis of LVO was utilized.  Radiation dose reduction techniques were utilized, including automated exposure control and exposure modulation based on body size.   This report was finalized on 5/10/2024 7:07 AM by Dr. Yemi Mclain M.D on Workstation: [id number]      CT Head Without Contrast    Result Date: 5/10/2024  Patient: IMELDA NAJERA  Time Out: 06:20 Exam(s): CT HEAD Without Contrast EXAM:   CT Head Without Intravenous Contrast CLINICAL HISTORY:    Reason for exam: follow up brain bleed. TECHNIQUE:   Axial computed tomography images of the head brain without intravenous contrast.  CTDI is 55.44 mGy and DLP is 991.6 mGy-cm.  This CT exam was performed according to the principle of ALARA (As Low As Reasonably Achievable) by using one or more of the following dose reduction techniques: automated exposure control, adjustment of the mA and or kV according to patient size, and or use of iterative reconstruction technique. COMPARISON:   CT Head dated 05 9 2024 FINDINGS:   Brain:  Redemonstrated left basal ganglial intraparenchymal hematoma with extension into the left frontal lobe.  This measures approximately 1. 6 x 2.7 x 2.6 cm with a total volume of 5.89 ml.  Overall, this is increased in size from prior study when measured in similar fashion, previously measuring 1.6 x 2.7 x 2.2 cm.  Adjacent vasogenic edema noted.  Consider continued attention on follow-up imaging.  Areas of decreased attenuation in the deep cerebral white matter are consistent with small vessel ischemic degenerative changes.  The cerebral and cerebellar sulci are prominent consistent with brain atrophy.   Ventricles:  Unremarkable.  No ventriculomegaly.   Bones joints:  Unremarkable.  No acute fracture.   Soft tissues:  Unremarkable.   Vasculature:  Atherosclerotic disease.   Sinuses:  Unremarkable as visualized.   Mastoid air cells:  Unremarkable as visualized.  No  mastoid effusion. IMPRESSION:     1.  Redemonstrated left basal ganglial intraparenchymal hematoma with extension into the left frontal lobe.  This measures approximately 1.6 x 2.7 x 2.6 cm with a total volume of 5.89 ml.  Overall, this is increased in size from prior study when measured in similar fashion, previously measuring 1.6 x 2.7 x 2.2 cm.  Adjacent vasogenic edema noted.  Consider continued attention on follow-up imaging. 2.  Small vessel ischemic degenerative changes. 3.  Cerebral and cerebellar atrophy.     Electronically signed by Hayden Garcia MD on 05-10-24 at 0620    XR Chest 1 View    Result Date: 5/9/2024  Patient: IMELDA NAJERA  Time Out: 23:36 Exam(s): XR CXR 1 VIEW EXAM:   XR Chest, 1 View CLINICAL HISTORY:    Reason for exam: Acute Stroke Protocol (onset < 12 hrs). TECHNIQUE:   Frontal view of the chest. COMPARISON:   No relevant prior studies available. FINDINGS:   Lungs:  Left basilar opacity is unchanged.  Interstitial prominence.   Pleural space:  Likely left pleural effusion.  No pneumothorax.   Heart:  Unremarkable.  No cardiomegaly.   Mediastinum:  Similar appearance of the cardiomediastinal silhouette.   Bones joints:  Unremarkable.  No acute fracture. IMPRESSION:     Cardiomediastinal enlargement with findings suggestive of pulmonary edema.   Left basilar opacity likely represents combination of effusion and atelectasis but airspace disease such as pneumonia not excluded.     Electronically signed by Scot Toussaint M.D. on 05-09-24 at 2333    Results for orders placed during the hospital encounter of 05/09/24    Adult Transthoracic Echo Complete w/ Color, Spectral and Contrast if Necessary Per Protocol    Interpretation Summary    Left ventricular systolic function is normal. Left ventricular ejection fraction appears to be 61 - 65%.    Left ventricular wall thickness is consistent with mild concentric hypertrophy.    Left ventricular diastolic function was indeterminate.    The right  ventricular cavity is mildly dilated. Normal right ventricular systolic function noted.    The left atrial cavity is moderately dilated.    No evidence of a patent foramen ovale. Saline test results are negative.    Insufficient TR velocity profile to estimate the right ventricular systolic pressure.    There is a trivial pericardial effusion.          Active Hospital Problems    Diagnosis  POA    **Intraparenchymal hemorrhage of brain [I61.9]  Yes      Resolved Hospital Problems   No resolved problems to display.         Assessment & Plan     Left basal ganglia intracranial hemorrhage likely hypertensive bleed patient refused MRI/MRA  Hypertension stroke service goal systolic blood pressure less than 150 they have had to give him a dose or 2 of IV labetalol despite restarting his home medicines to keep him around that 150 pearl I am going ahead and add some amlodipine.  Right hemiparesis, dysarthria secondary to #1  Persistent atrial fibrillation patient need anticoagulation for at least 45 days before candidate for Watchman and he can be anticoagulated currently with his bleeds.  Rate controlled with beta-blocker  Bifascicular block  Morbid obesity with a BMI greater than 47  Hyperlipidemia  Observed sleep apnea he clearly has obstructions very high risk airway with a lot of comorbid conditions he needs a sleep study.  I have placed orders in the computer for sleep study and follow-up with one of our sleep physicians in the sleep lab after the study completed.   question UTI he does have some hematuria and increased white count and leukocyte Estrace not clear whether this is infection or not, he is complaining of dysuria since he has had infections do this before.  I will give him a dose of Rocephin while we are watching for cultures    Plan for disposition: Patient is going to need placement will need his blood pressure controlled before he can go home and asked the hospitalist if they can help with blood pressure  management and if they would consider taking in transfer to their service    Haider Ospina Jr, MD  05/11/24  07:19 EDT    Time: 38 minutes on patient's care today

## 2024-05-11 NOTE — PLAN OF CARE
Goal Outcome Evaluation:  Plan of Care Reviewed With: patient        Progress: improving  Outcome Evaluation: Pt will need PT to address his trunk balance and R UE/LE weakness.  He is not able to ambulate right now.  He will, most likely need subacute rehab to progress his mobility so that he can function as he did prior to his admit.  Currently, he requires min A for trunlke mobility.      Anticipated Discharge Disposition (PT): inpatient rehabilitation facility
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
## 2024-05-12 PROBLEM — R73.03 PREDIABETES: Status: ACTIVE | Noted: 2024-05-12

## 2024-05-12 LAB
ANION GAP SERPL CALCULATED.3IONS-SCNC: 12 MMOL/L (ref 5–15)
BASOPHILS # BLD AUTO: 0.05 10*3/MM3 (ref 0–0.2)
BASOPHILS NFR BLD AUTO: 0.4 % (ref 0–1.5)
BUN SERPL-MCNC: 9 MG/DL (ref 8–23)
BUN/CREAT SERPL: 9.9 (ref 7–25)
CALCIUM SPEC-SCNC: 8.7 MG/DL (ref 8.6–10.5)
CHLORIDE SERPL-SCNC: 101 MMOL/L (ref 98–107)
CO2 SERPL-SCNC: 24 MMOL/L (ref 22–29)
CREAT SERPL-MCNC: 0.91 MG/DL (ref 0.76–1.27)
DEPRECATED RDW RBC AUTO: 43.9 FL (ref 37–54)
EGFRCR SERPLBLD CKD-EPI 2021: 93.5 ML/MIN/1.73
EOSINOPHIL # BLD AUTO: 0.29 10*3/MM3 (ref 0–0.4)
EOSINOPHIL NFR BLD AUTO: 2.5 % (ref 0.3–6.2)
ERYTHROCYTE [DISTWIDTH] IN BLOOD BY AUTOMATED COUNT: 12.7 % (ref 12.3–15.4)
GLUCOSE BLDC GLUCOMTR-MCNC: 110 MG/DL (ref 70–130)
GLUCOSE BLDC GLUCOMTR-MCNC: 111 MG/DL (ref 70–130)
GLUCOSE BLDC GLUCOMTR-MCNC: 123 MG/DL (ref 70–130)
GLUCOSE BLDC GLUCOMTR-MCNC: 124 MG/DL (ref 70–130)
GLUCOSE SERPL-MCNC: 106 MG/DL (ref 65–99)
HCT VFR BLD AUTO: 45.7 % (ref 37.5–51)
HGB BLD-MCNC: 15.5 G/DL (ref 13–17.7)
IMM GRANULOCYTES # BLD AUTO: 0.04 10*3/MM3 (ref 0–0.05)
IMM GRANULOCYTES NFR BLD AUTO: 0.4 % (ref 0–0.5)
LYMPHOCYTES # BLD AUTO: 1.65 10*3/MM3 (ref 0.7–3.1)
LYMPHOCYTES NFR BLD AUTO: 14.4 % (ref 19.6–45.3)
MCH RBC QN AUTO: 32.1 PG (ref 26.6–33)
MCHC RBC AUTO-ENTMCNC: 33.9 G/DL (ref 31.5–35.7)
MCV RBC AUTO: 94.6 FL (ref 79–97)
MONOCYTES # BLD AUTO: 1.1 10*3/MM3 (ref 0.1–0.9)
MONOCYTES NFR BLD AUTO: 9.6 % (ref 5–12)
NEUTROPHILS NFR BLD AUTO: 72.7 % (ref 42.7–76)
NEUTROPHILS NFR BLD AUTO: 8.29 10*3/MM3 (ref 1.7–7)
NRBC BLD AUTO-RTO: 0 /100 WBC (ref 0–0.2)
NT-PROBNP SERPL-MCNC: 413 PG/ML (ref 0–900)
PLATELET # BLD AUTO: 271 10*3/MM3 (ref 140–450)
PMV BLD AUTO: 10.2 FL (ref 6–12)
POTASSIUM SERPL-SCNC: 4 MMOL/L (ref 3.5–5.2)
RBC # BLD AUTO: 4.83 10*6/MM3 (ref 4.14–5.8)
SODIUM SERPL-SCNC: 137 MMOL/L (ref 136–145)
WBC NRBC COR # BLD AUTO: 11.42 10*3/MM3 (ref 3.4–10.8)

## 2024-05-12 PROCEDURE — 83880 ASSAY OF NATRIURETIC PEPTIDE: CPT | Performed by: STUDENT IN AN ORGANIZED HEALTH CARE EDUCATION/TRAINING PROGRAM

## 2024-05-12 PROCEDURE — 82948 REAGENT STRIP/BLOOD GLUCOSE: CPT

## 2024-05-12 PROCEDURE — 80048 BASIC METABOLIC PNL TOTAL CA: CPT | Performed by: INTERNAL MEDICINE

## 2024-05-12 PROCEDURE — 25010000002 CEFTRIAXONE PER 250 MG: Performed by: INTERNAL MEDICINE

## 2024-05-12 PROCEDURE — 99232 SBSQ HOSP IP/OBS MODERATE 35: CPT | Performed by: PSYCHIATRY & NEUROLOGY

## 2024-05-12 PROCEDURE — 85025 COMPLETE CBC W/AUTO DIFF WBC: CPT | Performed by: INTERNAL MEDICINE

## 2024-05-12 PROCEDURE — 99232 SBSQ HOSP IP/OBS MODERATE 35: CPT | Performed by: INTERNAL MEDICINE

## 2024-05-12 PROCEDURE — 99232 SBSQ HOSP IP/OBS MODERATE 35: CPT | Performed by: NURSE PRACTITIONER

## 2024-05-12 RX ORDER — ATORVASTATIN CALCIUM 20 MG/1
20 TABLET, FILM COATED ORAL NIGHTLY
Status: DISCONTINUED | OUTPATIENT
Start: 2024-05-12 | End: 2024-05-16 | Stop reason: HOSPADM

## 2024-05-12 RX ORDER — PANTOPRAZOLE SODIUM 40 MG/1
40 TABLET, DELAYED RELEASE ORAL
Status: DISCONTINUED | OUTPATIENT
Start: 2024-05-12 | End: 2024-05-16 | Stop reason: HOSPADM

## 2024-05-12 RX ADMIN — CARVEDILOL 3.12 MG: 3.12 TABLET, FILM COATED ORAL at 17:37

## 2024-05-12 RX ADMIN — PANTOPRAZOLE SODIUM 40 MG: 40 TABLET, DELAYED RELEASE ORAL at 08:48

## 2024-05-12 RX ADMIN — ATORVASTATIN CALCIUM 20 MG: 20 TABLET, FILM COATED ORAL at 20:19

## 2024-05-12 RX ADMIN — HYDROCHLOROTHIAZIDE 12.5 MG: 12.5 TABLET ORAL at 08:47

## 2024-05-12 RX ADMIN — Medication 10 ML: at 11:37

## 2024-05-12 RX ADMIN — Medication 10 ML: at 20:20

## 2024-05-12 RX ADMIN — LOSARTAN POTASSIUM 50 MG: 50 TABLET, FILM COATED ORAL at 08:47

## 2024-05-12 RX ADMIN — CEFTRIAXONE 2000 MG: 2 INJECTION, POWDER, FOR SOLUTION INTRAMUSCULAR; INTRAVENOUS at 16:09

## 2024-05-12 RX ADMIN — AMLODIPINE BESYLATE 10 MG: 10 TABLET ORAL at 08:47

## 2024-05-12 RX ADMIN — SENNOSIDES AND DOCUSATE SODIUM 2 TABLET: 50; 8.6 TABLET ORAL at 20:19

## 2024-05-12 RX ADMIN — CARVEDILOL 3.12 MG: 3.12 TABLET, FILM COATED ORAL at 08:48

## 2024-05-12 RX ADMIN — FLUTICASONE PROPIONATE 2 SPRAY: 50 SPRAY, METERED NASAL at 11:37

## 2024-05-12 NOTE — PROGRESS NOTES
LOS: 3 days   Patient Care Team:  Jessica Mishra APRN as PCP - General (Nurse Practitioner)    Chief Complaint: Follow-up hypertensive emergency, intraparenchymal hemorrhage.    Interval History: Still in rate controlled atrial fibrillation.  He is off Cardene at this point.  He just feels poorly in general.  No chest pain.    Vital Signs:  Temp:  [97.2 °F (36.2 °C)-99 °F (37.2 °C)] 98.2 °F (36.8 °C)  Heart Rate:  [60-83] 60  Resp:  [16-20] 16  BP: (127-159)/(81-99) 127/81    Intake/Output Summary (Last 24 hours) at 5/12/2024 1402  Last data filed at 5/12/2024 0900  Gross per 24 hour   Intake 118 ml   Output 1475 ml   Net -1357 ml       Physical Exam:   General Appearance:    No acute distress, alert   Lungs:     Decreased breath sounds bilaterally.     Heart:    Irregularly irregular rhythm and normal rate.  No murmurs, gallops, or rubs noted.    Abdomen:     Soft, nontender, nondistended.    Extremities:   No clubbing, cyanosis, or edema.     Results Review:    Results from last 7 days   Lab Units 05/12/24  0516   SODIUM mmol/L 137   POTASSIUM mmol/L 4.0   CHLORIDE mmol/L 101   CO2 mmol/L 24.0   BUN mg/dL 9   CREATININE mg/dL 0.91   GLUCOSE mg/dL 106*   CALCIUM mg/dL 8.7     Results from last 7 days   Lab Units 05/09/24  2218   HSTROP T ng/L 17     Results from last 7 days   Lab Units 05/12/24  0516   WBC 10*3/mm3 11.42*   HEMOGLOBIN g/dL 15.5   HEMATOCRIT % 45.7   PLATELETS 10*3/mm3 271     Results from last 7 days   Lab Units 05/09/24  2218   INR  1.00   APTT seconds 26.1     Results from last 7 days   Lab Units 05/10/24  0204   CHOLESTEROL mg/dL 233*         Results from last 7 days   Lab Units 05/10/24  0204   CHOLESTEROL mg/dL 233*   TRIGLYCERIDES mg/dL 150   HDL CHOL mg/dL 39*   LDL CHOL mg/dL 166*       I reviewed the patient's new clinical results.        Assessment:  1.  Acute left basal ganglia intraparenchymal hemorrhage  2.  Hypertensive emergency  3.  Persistent atrial fibrillation  4.   Bifascicular block (RBBB, LAFB)  5.  Morbid obesity, complicating all aspects of care  6.  History of previous strokes  7.  Hyperlipidemia    Plan:  -He is off Cardene.  Continue losartan 50 mg/day, HCTZ 12.5 mg/day, amlodipine 10 mg/day, and carvedilol 3.125 mg twice daily.  Goal systolic blood pressure is less than 150.    -Atrial fibrillation rate is controlled.  On Coreg.    -No anticoagulation for obvious reasons.    -Agree with need for sleep referral as he very likely has undiagnosed obstructive sleep apnea.    -Agree with initiation of Lipitor 20 mg/day.  LDL was 166.    -With regards to Watchman, he would still need to be on systemic anticoagulation for 45 days afterwards.  This can be evaluated further as an outpatient, although he would not be a candidate currently with an active brain bleed.    Timothy Love MD  05/12/24  14:02 EDT

## 2024-05-12 NOTE — PROGRESS NOTES
"DAILY PROGRESS NOTE  [facility]    Patient Identification:  Name: Semaj Narvaez  Age: 63 y.o.  Sex: male  :  1960  MRN: [id number]         Primary Care Physician: Jessica Mishra APRN    Subjective: patient is sitting up; feels better; wife is at the bedside  Interval History: follow up for hypertensive urgency, cerebral hemorrhage, obesity , a fib    Objective:    Scheduled Meds:amLODIPine, 10 mg, Oral, Q24H  carvedilol, 3.125 mg, Oral, BID With Meals  cefTRIAXone, 2,000 mg, Intravenous, Q24H  fluticasone, 2 spray, Each Nare, Daily  hydroCHLOROthiazide Oral, 12.5 mg, Oral, Daily  losartan, 50 mg, Oral, Q24H  senna-docusate sodium, 2 tablet, Oral, BID  sodium chloride, 10 mL, Intravenous, Q12H      Continuous Infusions:     Vital signs in last 24 hours:  Temp:  [97.2 °F (36.2 °C)-98.4 °F (36.9 °C)] 98.2 °F (36.8 °C)  Heart Rate:  [63-90] 83  Resp:  [15-20] 20  BP: (113-159)/() 140/87    Intake/Output:    Intake/Output Summary (Last 24 hours) at 2024 2337  Last data filed at 2024 1900  Gross per 24 hour   Intake 1820 ml   Output 2150 ml   Net -330 ml       Exam:  /87 (BP Location: Left arm, Patient Position: Lying)   Pulse 83   Temp 98.2 °F (36.8 °C)   Resp 20   Ht 168 cm (66.14\")   Wt 131 kg (288 lb 9.3 oz)   SpO2 97%   BMI 46.38 kg/m²     General Appearance:    Alert, cooperative, no distress, AAOx3                          Head:    Normocephalic, without obvious abnormality, atraumatic                           Eyes:    PERRL, conjunctivae/corneas clear, EOM's intact, both eyes                         Throat:   Lips, tongue, gums normal; oral mucosa pink and moist                           Neck:   Supple, symmetrical, trachea midline, no JVD                         Lungs:    Clear to auscultation bilaterally, respirations unlabored                 Chest Wall:    No tenderness or deformity                          Heart:    Regular rate and rhythm, S1 and S2 " normal, no murmur,no  rub or gallop                  Abdomen:     Soft, nontender, bowel sounds active, no masses, no organomegaly                  Extremities:   Extremities normal, atraumatic, no cyanosis or edema                      Data Review:  Labs in chart were reviewed.  WBC   Date Value Ref Range Status   05/11/2024 9.59 3.40 - 10.80 10*3/mm3 Final     Hemoglobin   Date Value Ref Range Status   05/11/2024 14.8 13.0 - 17.7 g/dL Final     Hematocrit   Date Value Ref Range Status   05/11/2024 42.2 37.5 - 51.0 % Final     Platelets   Date Value Ref Range Status   05/11/2024 280 140 - 450 10*3/mm3 Final     Sodium   Date Value Ref Range Status   05/11/2024 139 136 - 145 mmol/L Final     Potassium   Date Value Ref Range Status   05/11/2024 4.4 3.5 - 5.2 mmol/L Final     Comment:     Slight hemolysis detected by analyzer. Result may be falsely elevated.     Chloride   Date Value Ref Range Status   05/11/2024 105 98 - 107 mmol/L Final     CO2   Date Value Ref Range Status   05/11/2024 24.2 22.0 - 29.0 mmol/L Final     BUN   Date Value Ref Range Status   05/11/2024 10 8 - 23 mg/dL Final     Creatinine   Date Value Ref Range Status   05/11/2024 0.80 0.76 - 1.27 mg/dL Final     Glucose   Date Value Ref Range Status   05/11/2024 109 (H) 65 - 99 mg/dL Final     Calcium   Date Value Ref Range Status   05/11/2024 8.7 8.6 - 10.5 mg/dL Final         Assessment:  Active Hospital Problems    Diagnosis  POA    **Intraparenchymal hemorrhage of brain [I61.9]  Yes      Resolved Hospital Problems   No resolved problems to display.   Hypertensive urgency   Obesity  A fib  hyperlipidemia    Plan:  Will continue to monitor bp  Trend labs  Follow with you  Notes reviewed  Thanks for involving us in his care  Libby Vale MD  5/11/2024  23:37 EDT

## 2024-05-12 NOTE — PLAN OF CARE
Goal Outcome Evaluation:           Progress: improving          VSS.  A-Ox4.  No acute neurological changes throughout shift.  Pt reporting he has little appetite.  Pt encouraged to try and eat and offered alternatives to what he currently had, also brought pt nutritional shake.

## 2024-05-12 NOTE — PROGRESS NOTES
Name: Semaj Narvaez ADMIT: 2024   : 1960  PCP: Jessica Mishra APRN    MRN: [id number] LOS: 3 days   AGE/SEX: 63 y.o. male  ROOM: [facility]     Subjective   Subjective   Laying in bed, complains of nasal congestion, requesting Flonase.  BP stable.  No new weakness, numbness.  Denies headache or vision changes.    Review of Systems     Objective   Objective   Vital Signs  Temp:  [97.2 °F (36.2 °C)-99 °F (37.2 °C)] 99 °F (37.2 °C)  Heart Rate:  [65-83] 83  Resp:  [16-20] 16  BP: (127-159)/() 140/88  SpO2:  [90 %-97 %] 97 %  on  Flow (L/min):  [3] 3;   Device (Oxygen Therapy): nasal cannula  Body mass index is 46.34 kg/m².  Physical Exam    General: Alert, laying in bed, not in distress, obese, dysarthria+  HEENT: Normocephalic, atraumatic  CV: Irregular rhythm, normal rate  Lungs: Clear to auscultation bilaterally, no crackles or wheezes  Abdomen: Soft, nontender, nondistended  Extremities: No significant peripheral edema , right-sided weakness    Results Review     I reviewed the patient's new clinical results.  Results from last 7 days   Lab Units 24  0516 24  0210 05/10/24  02024  2218   WBC 10*3/mm3 11.42* 9.59 8.98 7.79   HEMOGLOBIN g/dL 15.5 14.8 15.7 15.5   PLATELETS 10*3/mm3 271 280 284 255     Results from last 7 days   Lab Units 24  0516 24  1213 24  0210 05/10/24  1419 05/10/24  0204 24  2218   SODIUM mmol/L 137  --  139  --  140 143   POTASSIUM mmol/L 4.0 4.4 3.6 4.0 3.5 4.0   CHLORIDE mmol/L 101  --  105  --  101 104   CO2 mmol/L 24.0  --  24.2  --  25.3 26.0   BUN mg/dL 9  --  10  --  12 12   CREATININE mg/dL 0.91  --  0.80  --  1.11 1.24   GLUCOSE mg/dL 106*  --  109*  --  117* 138*   Estimated Creatinine Clearance: 104.1 mL/min (by C-G formula based on SCr of 0.91 mg/dL).  Results from last 7 days   Lab Units 24  2218   ALBUMIN g/dL 4.3   BILIRUBIN mg/dL 0.3   ALK PHOS U/L 80   AST (SGOT) U/L 12   ALT (SGPT) U/L 16     Results from  "last 7 days   Lab Units 05/12/24  0516 05/11/24  0210 05/10/24  0204 05/09/24  2218   CALCIUM mg/dL 8.7 8.7 8.9 8.9   ALBUMIN g/dL  --   --   --  4.3     No results found for: \"COVID19\"  Hemoglobin A1C   Date/Time Value Ref Range Status   05/10/2024 0204 6.10 (H) 4.80 - 5.60 % Final     Glucose   Date/Time Value Ref Range Status   05/12/2024 0602 110 70 - 130 mg/dL Final   05/11/2024 2138 114 70 - 130 mg/dL Final   05/11/2024 1200 117 70 - 130 mg/dL Final   05/11/2024 0630 113 70 - 130 mg/dL Final   05/11/2024 0049 99 70 - 130 mg/dL Final   05/10/2024 1824 108 70 - 130 mg/dL Final   05/10/2024 1300 129 70 - 130 mg/dL Final           FL Video Swallow Single Contrast  Narrative: VIDEO SWALLOW STUDY     HISTORY: Dysphagia.     Fluoroscopy was provided for the speech pathologist during a video  swallow study.     Penetration and suspected aspiration visualized.     Impression: Fluoroscopy was provided for the speech pathologist during a  video swallow study. For full details please see the speech pathology  report     Dose: K,a 14.95 mGy         This report was finalized on 5/11/2024 3:09 PM by Dr. Marcello Reyes M.D  on Workstation: [id number]     CT Head Without Contrast  Narrative: CT OF THE HEAD WITHOUT CONTRAST     HISTORY: Intraparenchymal hemorrhage     COMPARISON: May 10, 2024     TECHNIQUE: Axial CT imaging was obtained through the brain. No IV  contrast was administered.     FINDINGS:  Area of intraparenchymal hemorrhage within the left basal ganglia/corona  radiata measures 2.5 x 1.7 cm. This is stable when compared to prior  exam. There is some surrounding edema. There is no intraventricular  hemorrhage. No new areas of hemorrhage are seen. There is some  periventricular and deep white matter microangiopathic change, as well  as an old lacunar infarct within the right basal ganglia. There is no  midline shift. Additional old lacunar infarct is noted within the left  frontal corona radiata. Mucosal thickening " is noted within the ethmoid  sinuses. Mucous retention cyst is noted within the right sphenoid sinus.     Impression: No significant interval change.     Radiation dose reduction techniques were utilized, including automated  exposure control and exposure modulation based on body size.        This report was finalized on 5/11/2024 5:17 AM by Dr. Sherron Villa M.D on Workstation: [id number]       Scheduled Medications  amLODIPine, 10 mg, Oral, Q24H  carvedilol, 3.125 mg, Oral, BID With Meals  cefTRIAXone, 2,000 mg, Intravenous, Q24H  fluticasone, 2 spray, Each Nare, Daily  hydroCHLOROthiazide Oral, 12.5 mg, Oral, Daily  losartan, 50 mg, Oral, Q24H  pantoprazole, 40 mg, Oral, Q AM  senna-docusate sodium, 2 tablet, Oral, BID  sodium chloride, 10 mL, Intravenous, Q12H    Infusions   Diet  Diet: Regular/House; No Mixed Consistencies; Texture: Soft to Chew (NDD 3); Soft to Chew: Chopped Meat; Fluid Consistency: Nectar Thick    I have personally reviewed     [x]  Laboratory   [x]  Microbiology   [x]  Radiology   [x]  EKG/Telemetry  [x]  Cardiology/Vascular   []  Pathology    [x]  Records       Assessment/Plan     Active Hospital Problems    Diagnosis  POA    **Intraparenchymal hemorrhage of brain [I61.9]  Yes    CKD (chronic kidney disease) stage 3, GFR 30-59 ml/min [N18.30]  Yes    Hypertension [I10]  Yes    Hyperlipidemia [E78.5]  Yes      Resolved Hospital Problems   No resolved problems to display.       Patient is a 63-year-old female with a history of atrial fibrillation not on anticoagulation prior to admission, hypertension, CKD, presented to the ED on 05/9/24 with acute right-sided weakness and slurred speech.  Patient called neighbor and sister, and EMS was called.    Left subcortical intracerebral hemorrhage with residual right-sided weakness and hemorrhage  -Etiology of the bleed most likely hypertensive   -Neurology/neurosurgery following  -SBP goal less than 150  -Status post Cardene drip,  -BP  currently stable , on HCTZ, amlodipine, carvedilol, losartan.  Cardiology managing    Atrial fibrillation  -Rate controlled with Coreg, not on anticoagulation secondary to above  -Cardiology following    GERD  -On omeprazole outpatient, initiate pantoprazole 40 mg daily      UTI?  -Urinalysis was positive for leukocytes, RBC, WBC  -Urine culture pending  -Was initiated on empiric IV ceftriaxone for 3 days on admission  -Follow-up final urine culture results    Prediabetes  -Hemoglobin A1c 6.1 05/10  -Continue SSI    CKD stage III  -Creatinine stable/improved compared to prior  - monitor    Hyperlipidemia  -Lipid panel 05/10/2024 showed total cholesterol of 233,   -Initiated on atorvastatin 20 mg daily, uptitrate as tolerated      SCDs for DVT prophylaxis.  Full code.  Discussed with patient.        Copied text in this note has been reviewed and is accurate as of 05/12/24.         Dictated utilizing Dragon dictation        Ata Israel MD  [facility]  05/12/24  08:38 EDT

## 2024-05-12 NOTE — PLAN OF CARE
Goal Outcome Evaluation:  Plan of Care Reviewed With: patient        Progress: improving     Pt transported from ICU around 2050. Pt asked for Flonase due to congestion. Pt slept through the night. Will continue to monitor for the remainder of the shift.

## 2024-05-12 NOTE — PROGRESS NOTES
[facility] NEUROSURGERY INTRACRANIAL PROGRESS NOTE    PATIENT IDENTIFICATION:   Name:  Semaj Narvaez      MRN:  [id number]     63 y.o.  male               CC: Facial weakness, right sided weakness, dysarthria      Subjective     Interval History:  No significant events overnight. Patient denies headache     ROS:  Constitutional: No fever, chills  HEENT: No headache, no vision changes, no tinnitus, no hearing difficulties  Neck: no stiffness  GI: No nausea, vomiting, + swallow difficulties  Neuro: No numbness, tingling, + right sided weakness, + speech difficulties, + balance difficulties    Objective     Vital signs in last 24 hours:  Temp:  [97.2 °F (36.2 °C)-99 °F (37.2 °C)] 99 °F (37.2 °C)  Heart Rate:  [65-83] 83  Resp:  [16-20] 16  BP: (127-159)/() 140/88      Intake/Output this shift:  No intake/output data recorded.      Intake/Output last 3 shifts:  I/O last 3 completed shifts:  In: 2810 [P.O.:750; I.V.:2060]  Out: 3475 [Urine:3475]    LABS:  Results from last 7 days   Lab Units 05/12/24  0516 05/11/24  0210 05/10/24  0204   WBC 10*3/mm3 11.42* 9.59 8.98   HEMOGLOBIN g/dL 15.5 14.8 15.7   HEMATOCRIT % 45.7 42.2 45.7   PLATELETS 10*3/mm3 271 280 284      Results from last 7 days   Lab Units 05/12/24  0516 05/11/24  1213 05/11/24  0210 05/10/24  1419 05/10/24  0204 05/09/24  2218   SODIUM mmol/L 137  --  139  --  140 143   POTASSIUM mmol/L 4.0 4.4 3.6   < > 3.5 4.0   CHLORIDE mmol/L 101  --  105  --  101 104   CO2 mmol/L 24.0  --  24.2  --  25.3 26.0   BUN mg/dL 9  --  10  --  12 12   CREATININE mg/dL 0.91  --  0.80  --  1.11 1.24   CALCIUM mg/dL 8.7  --  8.7  --  8.9 8.9   BILIRUBIN mg/dL  --   --   --   --   --  0.3   ALK PHOS U/L  --   --   --   --   --  80   ALT (SGPT) U/L  --   --   --   --   --  16   AST (SGOT) U/L  --   --   --   --   --  12   GLUCOSE mg/dL 106*  --  109*  --  117* 138*    < > = values in this interval not displayed.      5/12/24     IMAGING STUDIES:  Narrative & Impression    CT OF THE HEAD WITHOUT CONTRAST     HISTORY: Intraparenchymal hemorrhage     COMPARISON: May 10, 2024     TECHNIQUE: Axial CT imaging was obtained through the brain. No IV  contrast was administered.     FINDINGS:  Area of intraparenchymal hemorrhage within the left basal ganglia/corona  radiata measures 2.5 x 1.7 cm. This is stable when compared to prior  exam. There is some surrounding edema. There is no intraventricular  hemorrhage. No new areas of hemorrhage are seen. There is some  periventricular and deep white matter microangiopathic change, as well  as an old lacunar infarct within the right basal ganglia. There is no  midline shift. Additional old lacunar infarct is noted within the left  frontal corona radiata. Mucosal thickening is noted within the ethmoid  sinuses. Mucous retention cyst is noted within the right sphenoid sinus.     IMPRESSION:  No significant interval change.     Radiation dose reduction techniques were utilized, including automated  exposure control and exposure modulation based on body size.        This report was finalized on 5/11/2024 5:17 AM by Dr. Sherron Villa M.D on Workstation: [id number]       I personally viewed and interpreted the patient's chart.    Meds reviewed/changed: Yes  Rocephin 2000mg IV for 3 days-start 5/11  Norvasc 10mg daily  Coreg 3.125 mg BID  Flonase daily   HCTZ 12.5mg daily  Cozaar 50mg daily  Pericolace 8.6-50mg BID    Physical Exam:    General:   Awake, alert, oriented x3. Dysarthria noted   HEENT:    Atraumatic   Neck:    supple  CN II:    Visual fields full to confrontation  CN III IV VI:   Right upper quad vision deficit; Extraocular movements are full , PERRL   CN VII:   Facial weakness right   Motor:    LLE and LUE 5/5. Able to lift right leg off bed but unable to maintain. Unable to lift RUE. No fasciculations, rigidity, spasticity, or abnormal movements.  Reflexes:   Plantar responses are flexor.   Sensation:   Normal to light touch; no  extinction  Station and Gait:  Per PT note, patient unable to ambulated, requires Eileen for trunk mobility   Coordination:  Finger-to-nose test shows no dysmetria on left side    Extremities:   Wearing SCD    Assessment & Plan     ASSESSMENT:      Intraparenchymal hemorrhage of brain    Hyperlipidemia    Hypertension    CKD (chronic kidney disease) stage 3, GFR 30-59 ml/min    The patient is a 63 year old male who presented to the hospital with right hemiparesis and was found to have a 2 cm left subcortical intracerebral hemorrhage.      He is refusing MRI. Recommendations have been made for a sleep study. Today, he denies headache. He has right sided weakness, right facial weakness and dysarthria.     PLAN:   Keep SBP less than 150  CTH if neurological changes   ALEXY will sign off for now but will be available for questions. He will be scheduled for a follow-up visit in the office with a CT.     I discussed the patient's findings and my recommendations with patient       LOS: 3 days       Sandrita Puentes, KRYSTA  5/12/2024  08:58 EDT

## 2024-05-12 NOTE — PROGRESS NOTES
"DOS: 2024  NAME: Semaj Narvaez   : 1960  PCP: Jessica Mishra APRN  Chief Complaint   Patient presents with    Extremity Weakness     Via ems, pt started having R sided leg and arm weakness at . Pt also was having slurred speech, via ems. Team D called at . Pt has 18g iv in L ac from ems.       Chief complaint: right hemiparesis  Subjective: Right-sided weakness stable    Objective:  Vital signs: /81 (BP Location: Left arm, Patient Position: Lying)   Pulse 60   Temp 98.2 °F (36.8 °C) (Oral)   Resp 16   Ht 168 cm (66.14\")   Wt 131 kg (288 lb 5.8 oz)   SpO2 95%   BMI 46.34 kg/m²    Gen: NAD, vitals reviewed  MS: oriented x3, recent/remote memory intact, normal attention/concentration, language intact, no neglect.  CN: visual acuity grossly normal, PERRL, EOMI, right facial droop, moderate dysarthria  Motor: 4+/5 right upper extremity, 3/5 right lower extremity, 5/5 left upper and lower extremities    Laboratory results:  Lab Results   Component Value Date    GLUCOSE 106 (H) 2024    CALCIUM 8.7 2024     2024    K 4.0 2024    CO2 24.0 2024     2024    BUN 9 2024    CREATININE 0.91 2024    EGFRIFAFRI 74 2021    EGFRIFNONA 64 2021    BCR 9.9 2024    ANIONGAP 12.0 2024     Lab Results   Component Value Date    WBC 11.42 (H) 2024    HGB 15.5 2024    HCT 45.7 2024    MCV 94.6 2024     2024     Lab Results   Component Value Date     (H) 05/10/2024     (H) 2024     (H) 2023         Lab 05/10/24  0204   HEMOGLOBIN A1C 6.10*        Review of labs: WBC 11.4,     Diagnoses:  Hypertensive left subcortical intracerebral hemorrhage.  Essential hypertension  Probable obstructive sleep apnea  Paroxysmal atrial fibrillation  Mixed hyperlipidemia    Comment: Remains neurologically stable    Plan:  1. Normalize BP  2. No further neuroimaging " needed unless he has worsening neurologic symptoms  3. Sleep medicine referral after discharge for sleep study, probable [facility]. I will place the referral order for after discharge.  4. Consider referral for Watchman after recovery from ICH. Likely would be able to tolerate anticoagulation on a short term basis but like to be a poor candidate for long term anticoagulation.    Management discussed with patient. Will see prn while admitted.

## 2024-05-12 NOTE — PROGRESS NOTES
Pulmonary/critical care  Thank you Dr. Israel and [facility] service for taking patient in transfer will be available as needed

## 2024-05-13 ENCOUNTER — APPOINTMENT (OUTPATIENT)
Dept: CT IMAGING | Facility: HOSPITAL | Age: 64
End: 2024-05-13
Payer: MEDICARE

## 2024-05-13 LAB
ANION GAP SERPL CALCULATED.3IONS-SCNC: 11 MMOL/L (ref 5–15)
BACTERIA SPEC AEROBE CULT: ABNORMAL
BUN SERPL-MCNC: 15 MG/DL (ref 8–23)
BUN/CREAT SERPL: 15.3 (ref 7–25)
CALCIUM SPEC-SCNC: 8.8 MG/DL (ref 8.6–10.5)
CHLORIDE SERPL-SCNC: 101 MMOL/L (ref 98–107)
CO2 SERPL-SCNC: 27 MMOL/L (ref 22–29)
CREAT SERPL-MCNC: 0.98 MG/DL (ref 0.76–1.27)
EGFRCR SERPLBLD CKD-EPI 2021: 85.6 ML/MIN/1.73
GLUCOSE BLDC GLUCOMTR-MCNC: 101 MG/DL (ref 70–130)
GLUCOSE BLDC GLUCOMTR-MCNC: 132 MG/DL (ref 70–130)
GLUCOSE BLDC GLUCOMTR-MCNC: 135 MG/DL (ref 70–130)
GLUCOSE BLDC GLUCOMTR-MCNC: 138 MG/DL (ref 70–130)
GLUCOSE SERPL-MCNC: 100 MG/DL (ref 65–99)
POTASSIUM SERPL-SCNC: 4 MMOL/L (ref 3.5–5.2)
SODIUM SERPL-SCNC: 139 MMOL/L (ref 136–145)

## 2024-05-13 PROCEDURE — 80048 BASIC METABOLIC PNL TOTAL CA: CPT | Performed by: INTERNAL MEDICINE

## 2024-05-13 PROCEDURE — 97110 THERAPEUTIC EXERCISES: CPT

## 2024-05-13 PROCEDURE — 82948 REAGENT STRIP/BLOOD GLUCOSE: CPT

## 2024-05-13 PROCEDURE — 92526 ORAL FUNCTION THERAPY: CPT

## 2024-05-13 PROCEDURE — 99232 SBSQ HOSP IP/OBS MODERATE 35: CPT | Performed by: NURSE PRACTITIONER

## 2024-05-13 PROCEDURE — 70450 CT HEAD/BRAIN W/O DYE: CPT

## 2024-05-13 PROCEDURE — 25010000002 CEFTRIAXONE PER 250 MG: Performed by: INTERNAL MEDICINE

## 2024-05-13 PROCEDURE — 97530 THERAPEUTIC ACTIVITIES: CPT

## 2024-05-13 RX ADMIN — Medication 10 ML: at 09:15

## 2024-05-13 RX ADMIN — Medication 10 ML: at 20:16

## 2024-05-13 RX ADMIN — AMLODIPINE BESYLATE 10 MG: 10 TABLET ORAL at 09:15

## 2024-05-13 RX ADMIN — PANTOPRAZOLE SODIUM 40 MG: 40 TABLET, DELAYED RELEASE ORAL at 05:12

## 2024-05-13 RX ADMIN — ATORVASTATIN CALCIUM 20 MG: 20 TABLET, FILM COATED ORAL at 20:14

## 2024-05-13 RX ADMIN — CARVEDILOL 3.12 MG: 3.12 TABLET, FILM COATED ORAL at 09:15

## 2024-05-13 RX ADMIN — LOSARTAN POTASSIUM 50 MG: 50 TABLET, FILM COATED ORAL at 09:15

## 2024-05-13 RX ADMIN — FLUTICASONE PROPIONATE 2 SPRAY: 50 SPRAY, METERED NASAL at 09:15

## 2024-05-13 RX ADMIN — HYDROCHLOROTHIAZIDE 12.5 MG: 12.5 TABLET ORAL at 09:15

## 2024-05-13 RX ADMIN — Medication 10 ML: at 20:15

## 2024-05-13 RX ADMIN — CARVEDILOL 3.12 MG: 3.12 TABLET, FILM COATED ORAL at 16:36

## 2024-05-13 RX ADMIN — SENNOSIDES AND DOCUSATE SODIUM 2 TABLET: 50; 8.6 TABLET ORAL at 09:14

## 2024-05-13 RX ADMIN — CEFTRIAXONE 2000 MG: 2 INJECTION, POWDER, FOR SOLUTION INTRAMUSCULAR; INTRAVENOUS at 16:37

## 2024-05-13 NOTE — PLAN OF CARE
Goal Outcome Evaluation:  Plan of Care Reviewed With: patient           Outcome Evaluation: Upon entering room, pt. supine in bed, awake/alert, and agreeable to work with P.T. this date despite c/o fatigue and weakness.  Pt. requires Max. assist x 2 for bed mobility this AM.  Once sitting EOB, pt. requires CGA x 1 for static sitting balance and Min. assist x 1 for dynamic sitting balance.  Attempted sit <-> stand transfers with Dep. assist x 2 and pt. unable to clear his bottom off of the bed. BLE (AROM LLE/AAROM RLE) ther. ex. program x 10 reps completed for general strengthening.  Will continue to progress functional mobility as tolerated.

## 2024-05-13 NOTE — CASE MANAGEMENT/SOCIAL WORK
Continued Stay Note  [facility]     Patient Name: Semaj Narvaez  MRN: [id number]  Today's Date: 5/13/2024    Admit Date: 5/9/2024    Plan: Pending, BAR stroke screening   Discharge Plan       Row Name 05/13/24 1547       Plan    Plan Pending, BAR stroke screening    Plan Comments Patient's discharge plan is pending progress. LOKESH is following for stroke screening. CCP to follow. CD, CSW.                   Discharge Codes    No documentation.                 Expected Discharge Date and Time       Expected Discharge Date Expected Discharge Time    May 15, 2024
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
## 2024-05-13 NOTE — PLAN OF CARE
Goal Outcome Evaluation:  Plan of Care Reviewed With: patient           Outcome Evaluation: Re-eval of swallow completed.  Recommend continue with nectar thick liquids and soft solids no mixed consistencies.  Medications whole or crushed with purée.  Recommend frequent oral care with water/ice chips between meals.  Recommend upright, slow rate, double swallow and alternate liquids and solids.  SLP to follow for re-evaluation for upgrade as appropriate.      Anticipated Discharge Disposition (SLP): unknown          SLP Swallowing Diagnosis: mild, oral dysphagia, pharyngeal dysphagia (05/13/24 5817)

## 2024-05-13 NOTE — PLAN OF CARE
Goal Outcome Evaluation:  Plan of Care Reviewed With: patient        Progress: improving  Outcome Evaluation: Pt. A&Ox4.  No new neurological changes during the shift.  Oxygen drops to the 50's to 60's while sleeping. VSS.  No requests at this time.  Call light within reach.
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
## 2024-05-13 NOTE — PROGRESS NOTES
HOSPITAL FOLLOW UP NOTE    Patient Name: Semaj Narvaez  Patient : 1960        Date of Service:24  Provider of Service: KRYSTA Jaquez  Place of Service: [facility]  Referral Provider: Inderjit Britt MD          Follow Up: hypertensive emergency    Interval Hx:controlled atrial fib on tele, garbled speech, AAOx3    OBJECTIVE  Temp:  [97.5 °F (36.4 °C)-98.2 °F (36.8 °C)] 97.5 °F (36.4 °C)  Heart Rate:  [59-65] 60  Resp:  [16-18] 18  BP: (127-141)/() 141/77     Intake/Output Summary (Last 24 hours) at 2024 1205  Last data filed at 2024 0444  Gross per 24 hour   Intake 450 ml   Output 1350 ml   Net -900 ml     Body mass index is 46.34 kg/m².      24  0600 24  2053 24  0500   Weight: 133 kg (293 lb 14 oz) 131 kg (288 lb 9.3 oz) 131 kg (288 lb 5.8 oz)         Physical Exam:     General Appearance:    Alert, cooperative, in no acute distress   Head:    Normocephalic, without obvious abnormality, atraumatic   Eyes:            Conjunctivae and sclerae normal, no   icterus, no pallor, corneas clear, PERRLA   Neck:   No adenopathy, supple, trachea midline, no thyromegaly, no   carotid bruit, no JVD   Lungs:     Clear to auscultation,respirations regular, even and unlabored    Heart:    Irregular rhythm/rate, normal S1 and S2, no murmur, no gallop, no rub, no click   Chest Wall:    No abnormalities observed   Abdomen:     Normal bowel sounds, no masses, no organomegaly, soft, nontender, nondistended, no guarding, no rebound  tenderness   Extremities:   Moves all extremities well, janet LE edema, no cyanosis, no redness   Pulses:   Pulses palpable and equal bilaterally.          CURRENT MEDS    Scheduled Meds:amLODIPine, 10 mg, Oral, Q24H  atorvastatin, 20 mg, Oral, Nightly  carvedilol, 3.125 mg, Oral, BID With Meals  cefTRIAXone, 2,000 mg, Intravenous, Q24H  fluticasone, 2 spray, Each Nare, Daily  hydroCHLOROthiazide Oral, 12.5 mg, Oral, Daily  losartan,  50 mg, Oral, Q24H  pantoprazole, 40 mg, Oral, Q AM  senna-docusate sodium, 2 tablet, Oral, BID  sodium chloride, 10 mL, Intravenous, Q12H      Continuous Infusions:       Lab Review:   Results from last 7 days   Lab Units 05/13/24  0336 05/12/24  0516 05/10/24  0204 05/09/24  2218   SODIUM mmol/L 139 137   < > 143   POTASSIUM mmol/L 4.0 4.0   < > 4.0   CHLORIDE mmol/L 101 101   < > 104   CO2 mmol/L 27.0 24.0   < > 26.0   BUN mg/dL 15 9   < > 12   CREATININE mg/dL 0.98 0.91   < > 1.24   GLUCOSE mg/dL 100* 106*   < > 138*   CALCIUM mg/dL 8.8 8.7   < > 8.9   AST (SGOT) U/L  --   --   --  12   ALT (SGPT) U/L  --   --   --  16    < > = values in this interval not displayed.         Results from last 7 days   Lab Units 05/12/24  0516 05/11/24  0210   WBC 10*3/mm3 11.42* 9.59   HEMOGLOBIN g/dL 15.5 14.8   HEMATOCRIT % 45.7 42.2   PLATELETS 10*3/mm3 271 280     Results from last 7 days   Lab Units 05/09/24  2218   INR  1.00   APTT seconds 26.1         Results from last 7 days   Lab Units 05/10/24  0204   CHOLESTEROL mg/dL 233*   TRIGLYCERIDES mg/dL 150   HDL CHOL mg/dL 39*   LDL CHOL mg/dL 166*     Results from last 7 days   Lab Units 05/12/24  0516   PROBNP pg/mL 413.0               ASSESSMENT & PLAN    Intraparenchymal hemorrhage of brain    Hyperlipidemia    Hypertension    CKD (chronic kidney disease) stage 3, GFR 30-59 ml/min    Prediabetes    1.  Acute left basal ganglia intraparenchymal  2.  Hypertensive emergency: improving  3.  Paroxysmal atrial fibrillation: rate control with carvedilol.  No anticoagulation for obvious reasons  4.  Morbid obesity  5.  History of previous strokes  6.  Hyperlipidemia    Recommend outpatient sleep referral .   Patient would still need to be on systemic anticoagulation for 45 days post Watchman's device.  This can be evaluated further as outpatient.  He is not currently a candidate 2/2 to active brain bleed.     Yolanda Harley, APRN  05/13/24

## 2024-05-13 NOTE — CONSULTS
Pt awake, welcomed chp visit. Pt requested prayer. Chp prayed with pt for comfort, hope, healing, peace, and for medical staff. Pt expressed appreciation.    Pastoral Care Office remains available.

## 2024-05-13 NOTE — PROGRESS NOTES
Name: Semaj Narvaez ADMIT: 2024   : 1960  PCP: Jessica Mishra APRN    MRN: [id number] LOS: 4 days   AGE/SEX: 63 y.o. male  ROOM: [facility]     Subjective   Subjective   Pt doing okay today but notes his RUE is more weak today--can't raise it against gravity today. No other changes. Voiding well. Tolerating modified diet. No SOA or CP or palp. No N/V/D/abd pain. No F/C/NS. No HA or vis changes.       Objective   Objective   Vital Signs  Temp:  [97.3 °F (36.3 °C)-98.2 °F (36.8 °C)] 97.3 °F (36.3 °C)  Heart Rate:  [56-65] 56  Resp:  [18] 18  BP: (137-141)/() 137/78  SpO2:  [93 %-98 %] 93 %  on  Flow (L/min):  [2-3] 3;   Device (Oxygen Therapy): nasal cannula  Body mass index is 46.34 kg/m².  Physical Exam  Vitals and nursing note reviewed.   Constitutional:       General: He is not in acute distress.     Appearance: He is obese. He is ill-appearing (chronically). He is not toxic-appearing or diaphoretic.   HENT:      Nose: Nose normal.      Mouth/Throat:      Mouth: Mucous membranes are moist.      Pharynx: Oropharynx is clear.   Eyes:      General: No scleral icterus.        Right eye: No discharge.         Left eye: No discharge.      Conjunctiva/sclera: Conjunctivae normal.   Cardiovascular:      Rate and Rhythm: Normal rate. Rhythm irregular.      Pulses: Normal pulses.   Pulmonary:      Effort: Pulmonary effort is normal. No respiratory distress.      Breath sounds: Normal breath sounds. No wheezing or rales.   Abdominal:      General: Bowel sounds are normal. There is no distension.      Palpations: Abdomen is soft.      Tenderness: There is no abdominal tenderness.   Musculoskeletal:         General: No swelling or deformity.      Cervical back: Neck supple.   Skin:     General: Skin is warm and dry.      Capillary Refill: Capillary refill takes less than 2 seconds.      Coloration: Skin is not jaundiced.   Neurological:      Mental Status: He is alert.      Comments: Right facial droop,  slurred speech  Cannot lift RUE against gravity  RLE sl weaker than LLE   Psychiatric:         Mood and Affect: Mood normal.         Behavior: Behavior normal.         Thought Content: Thought content normal.       Results Review     I reviewed the patient's new clinical results.  Results from last 7 days   Lab Units 05/12/24  0516 05/11/24  0210 05/10/24  0204 05/09/24 2218   WBC 10*3/mm3 11.42* 9.59 8.98 7.79   HEMOGLOBIN g/dL 15.5 14.8 15.7 15.5   PLATELETS 10*3/mm3 271 280 284 255     Results from last 7 days   Lab Units 05/13/24  0336 05/12/24  0516 05/11/24  1213 05/11/24  0210 05/10/24  1419 05/10/24  0204   SODIUM mmol/L 139 137  --  139  --  140   POTASSIUM mmol/L 4.0 4.0 4.4 3.6   < > 3.5   CHLORIDE mmol/L 101 101  --  105  --  101   CO2 mmol/L 27.0 24.0  --  24.2  --  25.3   BUN mg/dL 15 9  --  10  --  12   CREATININE mg/dL 0.98 0.91  --  0.80  --  1.11   GLUCOSE mg/dL 100* 106*  --  109*  --  117*   EGFR mL/min/1.73 85.6 93.5  --  98.2  --  73.7    < > = values in this interval not displayed.     Results from last 7 days   Lab Units 05/09/24  2218   ALBUMIN g/dL 4.3   BILIRUBIN mg/dL 0.3   ALK PHOS U/L 80   AST (SGOT) U/L 12   ALT (SGPT) U/L 16     Results from last 7 days   Lab Units 05/13/24  0336 05/12/24  0516 05/11/24  0210 05/10/24  0204 05/09/24  2218   CALCIUM mg/dL 8.8 8.7 8.7 8.9 8.9   ALBUMIN g/dL  --   --   --   --  4.3       Glucose   Date/Time Value Ref Range Status   05/13/2024 1124 132 (H) 70 - 130 mg/dL Final   05/13/2024 0545 101 70 - 130 mg/dL Final   05/12/2024 2050 123 70 - 130 mg/dL Final   05/12/2024 1603 111 70 - 130 mg/dL Final   05/12/2024 1124 124 70 - 130 mg/dL Final   05/12/2024 0602 110 70 - 130 mg/dL Final   05/11/2024 2138 114 70 - 130 mg/dL Final       No radiology results for the last day    I have personally reviewed all medications:  Scheduled Medications  amLODIPine, 10 mg, Oral, Q24H  atorvastatin, 20 mg, Oral, Nightly  carvedilol, 3.125 mg, Oral, BID With  "Meals  cefTRIAXone, 2,000 mg, Intravenous, Q24H  fluticasone, 2 spray, Each Nare, Daily  hydroCHLOROthiazide Oral, 12.5 mg, Oral, Daily  losartan, 50 mg, Oral, Q24H  pantoprazole, 40 mg, Oral, Q AM  senna-docusate sodium, 2 tablet, Oral, BID  sodium chloride, 10 mL, Intravenous, Q12H    Infusions   Diet  Diet: Regular/House; No Mixed Consistencies; Texture: Soft to Chew (NDD 3); Soft to Chew: Chopped Meat; Fluid Consistency: Nectar Thick    I have personally reviewed:  [x]  Laboratory   [x]  Microbiology   [x]  Radiology   [x]  EKG/Telemetry  [x]  Cardiology/Vascular   []  Pathology    [x]  Records       Assessment/Plan     Active Hospital Problems    Diagnosis  POA    **Intraparenchymal hemorrhage of brain [I61.9]  Yes    Prediabetes [R73.03]  Unknown    CKD (chronic kidney disease) stage 3, GFR 30-59 ml/min [N18.30]  Yes    Hypertension [I10]  Yes    Hyperlipidemia [E78.5]  Yes      Resolved Hospital Problems   No resolved problems to display.       64yo gentleman with atrial fibrillation (not on anticoagulation prior to admission), hypertension, and CKD3, who presented to the ER with acute right-sided weakness and slurred speech. He was admitted to ICU with left intraparenchymal hemorrhage. We were asked to take over when pt transitioned out of ICU.     Left subcortical intracerebral hemorrhage with residual right-sided weakness  -Etiology most likely hypertensive   -Neuro, Card, and ALEXY following  -SBP goal <150  -Status post Cardene drip  -BPs acceptable on HCTZ, amlodipine, carvedilol, and losartan.    -Cardiology managing BP  -RUE more weak today, will check stat CT Head     Atrial fibrillation  RBBB and LAFB  -Rate controlled with Coreg, not on anticoagulation secondary to above  -Cardiology following  -Can discuss AC further as outpt      GERD  -continue PPI    Dysphagia  -Tolerating modified diet per SLP recs:  \"nectar thick liquids and soft solids no mixed consistencies. Medications whole or crushed with " "purée. Recommend frequent oral care with water/ice chips between meals. Recommend upright, slow rate, double swallow and alternate liquids and solids\"     UTI?  -Urinalysis was positive for leukocytes, RBC, WBC  -Urine culture grew Enterobacter  -Completing 3d course of CTX     Prediabetes  -A1c 6.1   -Not on meds PTA  -Sugars acceptable  -Continue SSI     CKD3  -Cr wnl here  -Continue to monitor     Hyperlipidemia  -Total cholesterol of 233,   -Started atorvastatin    Nasal decongestant spray dependence  -Asking staff to place him on nasal cannula O2 for this reason      SCDs for DVT prophylaxis.  Full code.  Discussed with patient, nursing staff, CCP, and care team on multidisciplinary rounds. D/w Dr. Khalil.  Anticipate discharge to SNU facility once arrangements have been made.  Expected Discharge Date: 5/15/2024; Expected Discharge Time: TBD      Milton Prasad MD  [facility]  05/13/24  14:28 EDT      "

## 2024-05-13 NOTE — PLAN OF CARE
Goal Outcome Evaluation:  Plan of Care Reviewed With: patient           Outcome Evaluation: Pt presents in bed, c/o R shld pain when woke up this AM and can replicate during PROM R shld and pt reports cannot move R UE as much as  yesterday.   Today pt demo trace at fingers and trace/slight at wrist, a few degrees elbow flexion.   No AROM at shld.  OT provides support for AROM/AAROM for about 5 reps in available range of hand. OT ed pt to do SROM R hand and elbow but not at shld due to recent pain.   Will cont to follow for skilled OT.

## 2024-05-14 ENCOUNTER — APPOINTMENT (OUTPATIENT)
Dept: CARDIOLOGY | Facility: HOSPITAL | Age: 64
End: 2024-05-14
Payer: MEDICARE

## 2024-05-14 ENCOUNTER — APPOINTMENT (OUTPATIENT)
Dept: GENERAL RADIOLOGY | Facility: HOSPITAL | Age: 64
End: 2024-05-14
Payer: MEDICARE

## 2024-05-14 LAB
ALBUMIN SERPL-MCNC: 3.6 G/DL (ref 3.5–5.2)
ALP SERPL-CCNC: 77 U/L (ref 39–117)
ALT SERPL W P-5'-P-CCNC: 18 U/L (ref 1–41)
ANION GAP SERPL CALCULATED.3IONS-SCNC: 12.6 MMOL/L (ref 5–15)
AST SERPL-CCNC: 11 U/L (ref 1–40)
BH CV UPPER VENOUS LEFT INTERNAL JUGULAR AUGMENT: NORMAL
BH CV UPPER VENOUS LEFT INTERNAL JUGULAR COMPRESS: NORMAL
BH CV UPPER VENOUS LEFT INTERNAL JUGULAR PHASIC: NORMAL
BH CV UPPER VENOUS LEFT INTERNAL JUGULAR SPONT: NORMAL
BH CV UPPER VENOUS LEFT SUBCLAVIAN AUGMENT: NORMAL
BH CV UPPER VENOUS LEFT SUBCLAVIAN COMPRESS: NORMAL
BH CV UPPER VENOUS LEFT SUBCLAVIAN PHASIC: NORMAL
BH CV UPPER VENOUS LEFT SUBCLAVIAN SPONT: NORMAL
BH CV UPPER VENOUS RIGHT AXILLARY AUGMENT: NORMAL
BH CV UPPER VENOUS RIGHT AXILLARY COMPRESS: NORMAL
BH CV UPPER VENOUS RIGHT AXILLARY PHASIC: NORMAL
BH CV UPPER VENOUS RIGHT AXILLARY SPONT: NORMAL
BH CV UPPER VENOUS RIGHT BASILIC FOREARM COMPRESS: NORMAL
BH CV UPPER VENOUS RIGHT BASILIC UPPER COMPRESS: NORMAL
BH CV UPPER VENOUS RIGHT BRACHIAL COMPRESS: NORMAL
BH CV UPPER VENOUS RIGHT CEPHALIC FOREARM COMPRESS: NORMAL
BH CV UPPER VENOUS RIGHT CEPHALIC UPPER COMPRESS: NORMAL
BH CV UPPER VENOUS RIGHT INTERNAL JUGULAR AUGMENT: NORMAL
BH CV UPPER VENOUS RIGHT INTERNAL JUGULAR COMPRESS: NORMAL
BH CV UPPER VENOUS RIGHT INTERNAL JUGULAR PHASIC: NORMAL
BH CV UPPER VENOUS RIGHT INTERNAL JUGULAR SPONT: NORMAL
BH CV UPPER VENOUS RIGHT RADIAL COMPRESS: NORMAL
BH CV UPPER VENOUS RIGHT SUBCLAVIAN AUGMENT: NORMAL
BH CV UPPER VENOUS RIGHT SUBCLAVIAN COMPRESS: NORMAL
BH CV UPPER VENOUS RIGHT SUBCLAVIAN PHASIC: NORMAL
BH CV UPPER VENOUS RIGHT SUBCLAVIAN SPONT: NORMAL
BH CV UPPER VENOUS RIGHT ULNAR COMPRESS: NORMAL
BILIRUB CONJ SERPL-MCNC: <0.2 MG/DL (ref 0–0.3)
BILIRUB INDIRECT SERPL-MCNC: NORMAL MG/DL
BILIRUB SERPL-MCNC: 0.5 MG/DL (ref 0–1.2)
BUN SERPL-MCNC: 19 MG/DL (ref 8–23)
BUN/CREAT SERPL: 17 (ref 7–25)
CALCIUM SPEC-SCNC: 9 MG/DL (ref 8.6–10.5)
CHLORIDE SERPL-SCNC: 101 MMOL/L (ref 98–107)
CO2 SERPL-SCNC: 25.4 MMOL/L (ref 22–29)
CREAT SERPL-MCNC: 1.12 MG/DL (ref 0.76–1.27)
DEPRECATED RDW RBC AUTO: 44.4 FL (ref 37–54)
EGFRCR SERPLBLD CKD-EPI 2021: 72.9 ML/MIN/1.73
ERYTHROCYTE [DISTWIDTH] IN BLOOD BY AUTOMATED COUNT: 12.4 % (ref 12.3–15.4)
GLUCOSE BLDC GLUCOMTR-MCNC: 106 MG/DL (ref 70–130)
GLUCOSE BLDC GLUCOMTR-MCNC: 127 MG/DL (ref 70–130)
GLUCOSE BLDC GLUCOMTR-MCNC: 148 MG/DL (ref 70–130)
GLUCOSE BLDC GLUCOMTR-MCNC: 274 MG/DL (ref 70–130)
GLUCOSE SERPL-MCNC: 114 MG/DL (ref 65–99)
HCT VFR BLD AUTO: 45.1 % (ref 37.5–51)
HGB BLD-MCNC: 15.2 G/DL (ref 13–17.7)
MAGNESIUM SERPL-MCNC: 2 MG/DL (ref 1.6–2.4)
MCH RBC QN AUTO: 32.1 PG (ref 26.6–33)
MCHC RBC AUTO-ENTMCNC: 33.7 G/DL (ref 31.5–35.7)
MCV RBC AUTO: 95.3 FL (ref 79–97)
PHOSPHATE SERPL-MCNC: 3.9 MG/DL (ref 2.5–4.5)
PLATELET # BLD AUTO: 273 10*3/MM3 (ref 140–450)
PMV BLD AUTO: 10.4 FL (ref 6–12)
POTASSIUM SERPL-SCNC: 4.2 MMOL/L (ref 3.5–5.2)
PROT SERPL-MCNC: 6.5 G/DL (ref 6–8.5)
RBC # BLD AUTO: 4.73 10*6/MM3 (ref 4.14–5.8)
SODIUM SERPL-SCNC: 139 MMOL/L (ref 136–145)
WBC NRBC COR # BLD AUTO: 10.94 10*3/MM3 (ref 3.4–10.8)

## 2024-05-14 PROCEDURE — 93971 EXTREMITY STUDY: CPT | Performed by: SURGERY

## 2024-05-14 PROCEDURE — 82948 REAGENT STRIP/BLOOD GLUCOSE: CPT

## 2024-05-14 PROCEDURE — 80076 HEPATIC FUNCTION PANEL: CPT | Performed by: HOSPITALIST

## 2024-05-14 PROCEDURE — 85027 COMPLETE CBC AUTOMATED: CPT | Performed by: HOSPITALIST

## 2024-05-14 PROCEDURE — 93971 EXTREMITY STUDY: CPT

## 2024-05-14 PROCEDURE — 73030 X-RAY EXAM OF SHOULDER: CPT

## 2024-05-14 PROCEDURE — 99232 SBSQ HOSP IP/OBS MODERATE 35: CPT | Performed by: PSYCHIATRY & NEUROLOGY

## 2024-05-14 PROCEDURE — 99232 SBSQ HOSP IP/OBS MODERATE 35: CPT | Performed by: NURSE PRACTITIONER

## 2024-05-14 PROCEDURE — 80048 BASIC METABOLIC PNL TOTAL CA: CPT | Performed by: INTERNAL MEDICINE

## 2024-05-14 PROCEDURE — 83735 ASSAY OF MAGNESIUM: CPT | Performed by: HOSPITALIST

## 2024-05-14 PROCEDURE — 84100 ASSAY OF PHOSPHORUS: CPT | Performed by: HOSPITALIST

## 2024-05-14 RX ORDER — CARVEDILOL 6.25 MG/1
6.25 TABLET ORAL 2 TIMES DAILY WITH MEALS
Status: DISCONTINUED | OUTPATIENT
Start: 2024-05-14 | End: 2024-05-16 | Stop reason: HOSPADM

## 2024-05-14 RX ORDER — CARVEDILOL 3.12 MG/1
3.12 TABLET ORAL ONCE
Status: COMPLETED | OUTPATIENT
Start: 2024-05-14 | End: 2024-05-14

## 2024-05-14 RX ADMIN — CARVEDILOL 3.12 MG: 3.12 TABLET, FILM COATED ORAL at 09:19

## 2024-05-14 RX ADMIN — CARVEDILOL 3.12 MG: 3.12 TABLET, FILM COATED ORAL at 12:02

## 2024-05-14 RX ADMIN — PANTOPRAZOLE SODIUM 40 MG: 40 TABLET, DELAYED RELEASE ORAL at 06:38

## 2024-05-14 RX ADMIN — CARVEDILOL 6.25 MG: 6.25 TABLET, FILM COATED ORAL at 21:00

## 2024-05-14 RX ADMIN — AMLODIPINE BESYLATE 10 MG: 10 TABLET ORAL at 09:20

## 2024-05-14 RX ADMIN — Medication 10 ML: at 09:20

## 2024-05-14 RX ADMIN — FLUTICASONE PROPIONATE 2 SPRAY: 50 SPRAY, METERED NASAL at 09:20

## 2024-05-14 RX ADMIN — Medication 10 ML: at 21:27

## 2024-05-14 RX ADMIN — LOSARTAN POTASSIUM 50 MG: 50 TABLET, FILM COATED ORAL at 09:19

## 2024-05-14 RX ADMIN — HYDROCHLOROTHIAZIDE 12.5 MG: 12.5 TABLET ORAL at 09:20

## 2024-05-14 RX ADMIN — ATORVASTATIN CALCIUM 20 MG: 20 TABLET, FILM COATED ORAL at 21:25

## 2024-05-14 RX ADMIN — SENNOSIDES AND DOCUSATE SODIUM 2 TABLET: 50; 8.6 TABLET ORAL at 21:25

## 2024-05-14 NOTE — CASE MANAGEMENT/SOCIAL WORK
Continued Stay Note  [facility]     Patient Name: Semaj Narvaez  MRN: [id number]  Today's Date: 5/14/2024    Admit Date: 5/9/2024    Plan: SNF referrals pending   Discharge Plan       Row Name 05/14/24 1540       Plan    Plan SNF referrals pending    Patient/Family in Agreement with Plan yes    Plan Comments CCP met with patient’s sister at bedside. Patient was off the floor. Discussed SNF placement. Provided sister with list of facilities. Patients’ sister stated she needs to talk to patient and other sister about what facilities they would like. She did state that CCP could make referrals to [facility], [facility], [facility], [facility], [facility], and [facility]. CCP made referrals.                   Discharge Codes    No documentation.                 Expected Discharge Date and Time       Expected Discharge Date Expected Discharge Time    May 15, 2024

## 2024-05-14 NOTE — PROGRESS NOTES
HOSPITAL FOLLOW UP NOTE    Patient Name: Semaj Narvaez  Patient : 1960        Date of Service:24  Provider of Service: KRYSTA Jaquez  Place of Service: [facility]  Referral Provider: Inderjit Britt MD          Follow Up: hypertensive emergency    Interval Hx: controlled atrial fib on tele, garbled speech, AAOx3    OBJECTIVE  Temp:  [97.3 °F (36.3 °C)-97.5 °F (36.4 °C)] 97.5 °F (36.4 °C)  Heart Rate:  [56-73] 66  Resp:  [18] 18  BP: (133-137)/(71-99) 137/99     Intake/Output Summary (Last 24 hours) at 2024 1019  Last data filed at 2024 0443  Gross per 24 hour   Intake 60 ml   Output 500 ml   Net -440 ml     Body mass index is 46.1 kg/m².      24  2053 24  0500 24  0517   Weight: 131 kg (288 lb 9.3 oz) 131 kg (288 lb 5.8 oz) 130 kg (286 lb 13.1 oz)         Physical Exam:     General Appearance:    Alert, cooperative, in no acute distress   Head:    Normocephalic, without obvious abnormality, atraumatic   Eyes:            Conjunctivae and sclerae normal, no   icterus, no pallor, corneas clear, PERRLA   Neck:   No adenopathy, supple, trachea midline, no thyromegaly, no   carotid bruit, no JVD   Lungs:     Clear to auscultation,respirations regular, even and unlabored    Heart:    Irregular rhythm/rate, normal S1 and S2, no murmur, no gallop, no rub, no click   Chest Wall:    No abnormalities observed   Abdomen:     Normal bowel sounds, no masses, no organomegaly, soft, nontender, nondistended, no guarding, no rebound  tenderness   Extremities:   Moves all extremities well, janet LE edema, no cyanosis, no redness   Pulses:   Pulses palpable and equal bilaterally.          CURRENT MEDS    Scheduled Meds:amLODIPine, 10 mg, Oral, Q24H  atorvastatin, 20 mg, Oral, Nightly  carvedilol, 3.125 mg, Oral, BID With Meals  fluticasone, 2 spray, Each Nare, Daily  hydroCHLOROthiazide Oral, 12.5 mg, Oral, Daily  losartan, 50 mg, Oral, Q24H  pantoprazole, 40 mg, Oral, Q  AM  senna-docusate sodium, 2 tablet, Oral, BID  sodium chloride, 10 mL, Intravenous, Q12H      Continuous Infusions:       Lab Review:   Results from last 7 days   Lab Units 05/14/24  0637 05/13/24  0336 05/10/24  0204 05/09/24  2218   SODIUM mmol/L 139 139   < > 143   POTASSIUM mmol/L 4.2 4.0   < > 4.0   CHLORIDE mmol/L 101 101   < > 104   CO2 mmol/L 25.4 27.0   < > 26.0   BUN mg/dL 19 15   < > 12   CREATININE mg/dL 1.12 0.98   < > 1.24   GLUCOSE mg/dL 114* 100*   < > 138*   CALCIUM mg/dL 9.0 8.8   < > 8.9   AST (SGOT) U/L 11  --   --  12   ALT (SGPT) U/L 18  --   --  16    < > = values in this interval not displayed.         Results from last 7 days   Lab Units 05/14/24  0637 05/12/24  0516   WBC 10*3/mm3 10.94* 11.42*   HEMOGLOBIN g/dL 15.2 15.5   HEMATOCRIT % 45.1 45.7   PLATELETS 10*3/mm3 273 271     Results from last 7 days   Lab Units 05/09/24  2218   INR  1.00   APTT seconds 26.1     Results from last 7 days   Lab Units 05/14/24  0637   MAGNESIUM mg/dL 2.0     Results from last 7 days   Lab Units 05/10/24  0204   CHOLESTEROL mg/dL 233*   TRIGLYCERIDES mg/dL 150   HDL CHOL mg/dL 39*   LDL CHOL mg/dL 166*     Results from last 7 days   Lab Units 05/12/24  0516   PROBNP pg/mL 413.0               ASSESSMENT & PLAN    Intraparenchymal hemorrhage of brain    Hyperlipidemia    Hypertension    CKD (chronic kidney disease) stage 3, GFR 30-59 ml/min    Prediabetes    1.  Acute left basal ganglia intraparenchymal  2.  Hypertensive emergency: improving.  Would like to get diastolic down more.  Increase coreg to 6.25mg BID  3.  Paroxysmal atrial fibrillation: rate control with carvedilol.  No anticoagulation for obvious reasons  4.  Morbid obesity  5.  History of previous strokes  6.  Hyperlipidemia: on statin    Recommend outpatient sleep referral .   Patient would still need to be on systemic anticoagulation for 45 days post Watchman's device.  This can be evaluated further as outpatient.  He is not currently a  candidate 2/2 to active brain bleed.     Yolanda Harley, APRN  05/14/24

## 2024-05-14 NOTE — PROGRESS NOTES
[facility] Acute Rehab    Stroke screening now full referral request made today per CCP. Chart work done. Noted pt unable to clear bottom fro bed with PT yesterday. Will follow progress with therapy and will see pt today to discuss Acute Rehab and DC plan.     Jasmin Thacker RN  Acute Rehab Admission Nurse      Spoke with pt. Acute Rehab discussed. Pt lives with son who works 2pm-2am daily. Permission given to call sisters Ilene/ Ade) to discuss dc plan for anticipated need for 24/7 asst after dc.   Call placed to sister Ilene- msg left.   Call placed to sister Ade- she does not know of anyone that could provide 24/7 asst after dc.     Will discuss with Dr. Branch

## 2024-05-14 NOTE — DISCHARGE PLACEMENT REQUEST
"Semaj Najera (63 y.o. Male)       Date of Birth   1960    Social Security Number       Address   [address] [facility] [address]    Home Phone   [phone]    MRN   [id number]       Advent   [employer]    Marital Status   Single                            Admission Date   5/9/24    Admission Type   Emergency    Admitting Provider   Nguyen Og MD    Attending Provider   Milton Prasad MD    Department, Room/Bed   [address], [address]       Discharge Date       Discharge Disposition       Discharge Destination                                 Attending Provider: Milton Prasad MD    Allergies: Lisinopril    Isolation: None   Infection: None   Code Status: CPR    Ht: 168 cm (66.14\")   Wt: 130 kg (286 lb 13.1 oz)    Admission Cmt: None   Principal Problem: Intraparenchymal hemorrhage of brain [I61.9]                   Active Insurance as of 5/9/2024       Primary Coverage       Payor Plan Insurance Group Employer/Plan Group    MEDICARE MEDICARE A & B        Payor Plan Address Payor Plan Phone Number Payor Plan Fax Number Effective Dates    [address] [phone]  11/1/2023 - None Entered    [facility] [id number]         Subscriber Name Subscriber Birth Date Member ID       SEMAJ NAJERA 1960 [id number]               Secondary Coverage       Payor Plan Insurance Group Employer/Plan Group    ANTH BLUE CROSS [facility] SUPP [id number]       Payor Plan Address Payor Plan Phone Number Payor Plan Fax Number Effective Dates    [address]   11/1/2023 - None Entered    [facility] [id number]         Subscriber Name Subscriber Birth Date Member ID       SEMAJ NAJERA 1960 [id number]                     Emergency Contacts        (Rel.) Home Phone Work Phone Mobile Phone    THEAEVER DONOVAN (Sister) -- -- [phone]    ROSADO,ANEL (Sister) -- -- [phone]    carlene gray (Sister) [phone] -- --    ledy najera (Son) [phone] -- --    Brinda Najera " (Grandchild) -- -- [phone]

## 2024-05-14 NOTE — PLAN OF CARE
Goal Outcome Evaluation:  Plan of Care Reviewed With: patient        Progress: improving  Outcome Evaluation: Patient still not moving his right arm but has full feeling in it. NIH is a 5. Patient is placed on oxygen at 3 liters while he sleeps because his oxygen level drops in the upper 60-70 then bounces back. Patient stated that he has sleep apnea, but does not use a c-pap. No other issues noted and no complaints voiced. WCTM

## 2024-05-14 NOTE — PROGRESS NOTES
Name: Semaj Narvaez ADMIT: 2024   : 1960  PCP: Jessica Mishra APRN    MRN: [id number] LOS: 5 days   AGE/SEX: 63 y.o. male  ROOM: [facility]     Subjective   Subjective   Pt doing okay today. C/o right shoulder pain. Thinks he may have strained it when he was using his other arm to move it around. No other changes. Voiding well. Tolerating modified diet. No SOA or CP or palp. No N/V/D/abd pain. No F/C/NS. No HA or vis changes.       Objective   Objective   Vital Signs  Temp:  [97.3 °F (36.3 °C)-97.5 °F (36.4 °C)] 97.5 °F (36.4 °C)  Heart Rate:  [56-73] 66  Resp:  [18] 18  BP: (133-137)/(71-99) 137/99  SpO2:  [93 %-97 %] 93 %  on  Flow (L/min):  [3] 3;   Device (Oxygen Therapy): room air  Body mass index is 46.1 kg/m².  Physical Exam  Vitals and nursing note reviewed. Exam conducted with a chaperone present (Sister).   Constitutional:       General: He is not in acute distress.     Appearance: He is obese. He is ill-appearing (chronically). He is not toxic-appearing or diaphoretic.   HENT:      Nose: Nose normal.      Mouth/Throat:      Mouth: Mucous membranes are moist.      Pharynx: Oropharynx is clear.   Eyes:      General: No scleral icterus.        Right eye: No discharge.         Left eye: No discharge.      Conjunctiva/sclera: Conjunctivae normal.   Cardiovascular:      Rate and Rhythm: Normal rate. Rhythm irregular.      Pulses: Normal pulses.   Pulmonary:      Effort: Pulmonary effort is normal. No respiratory distress.      Breath sounds: Normal breath sounds. No wheezing or rales.   Abdominal:      General: Bowel sounds are normal. There is no distension.      Palpations: Abdomen is soft.      Tenderness: There is no abdominal tenderness.   Musculoskeletal:         General: No swelling or deformity.      Cervical back: Neck supple.      Comments: No TTP or deformity of right shoulder  PROM is painful at right shoulder  Mild edema of RUE   Skin:     General: Skin is warm and dry.      Capillary  Refill: Capillary refill takes less than 2 seconds.      Coloration: Skin is not jaundiced.   Neurological:      Mental Status: He is alert.      Comments: Right facial droop, slurred speech  Cannot lift RUE against gravity  RLE sl weaker than LLE   Psychiatric:         Mood and Affect: Mood normal.         Behavior: Behavior normal.         Thought Content: Thought content normal.       Results Review     I reviewed the patient's new clinical results.  Results from last 7 days   Lab Units 05/14/24 0637 05/12/24  0516 05/11/24  0210 05/10/24  0204   WBC 10*3/mm3 10.94* 11.42* 9.59 8.98   HEMOGLOBIN g/dL 15.2 15.5 14.8 15.7   PLATELETS 10*3/mm3 273 271 280 284     Results from last 7 days   Lab Units 05/14/24 0637 05/13/24 0336 05/12/24  0516 05/11/24  1213 05/11/24  0210   SODIUM mmol/L 139 139 137  --  139   POTASSIUM mmol/L 4.2 4.0 4.0 4.4 3.6   CHLORIDE mmol/L 101 101 101  --  105   CO2 mmol/L 25.4 27.0 24.0  --  24.2   BUN mg/dL 19 15 9  --  10   CREATININE mg/dL 1.12 0.98 0.91  --  0.80   GLUCOSE mg/dL 114* 100* 106*  --  109*   EGFR mL/min/1.73 72.9 85.6 93.5  --  98.2     Results from last 7 days   Lab Units 05/14/24 0637 05/09/24  2218   ALBUMIN g/dL 3.6 4.3   BILIRUBIN mg/dL 0.5 0.3   ALK PHOS U/L 77 80   AST (SGOT) U/L 11 12   ALT (SGPT) U/L 18 16     Results from last 7 days   Lab Units 05/14/24 0637 05/13/24 0336 05/12/24  0516 05/11/24 0210 05/10/24  0204 05/09/24  2218   CALCIUM mg/dL 9.0 8.8 8.7 8.7   < > 8.9   ALBUMIN g/dL 3.6  --   --   --   --  4.3   MAGNESIUM mg/dL 2.0  --   --   --   --   --    PHOSPHORUS mg/dL 3.9  --   --   --   --   --     < > = values in this interval not displayed.       Glucose   Date/Time Value Ref Range Status   05/14/2024 1115 274 (H) 70 - 130 mg/dL Final   05/14/2024 0558 106 70 - 130 mg/dL Final   05/13/2024 2100 138 (H) 70 - 130 mg/dL Final   05/13/2024 1621 135 (H) 70 - 130 mg/dL Final   05/13/2024 1124 132 (H) 70 - 130 mg/dL Final   05/13/2024 0545 101 70 -  130 mg/dL Final   05/12/2024 2050 123 70 - 130 mg/dL Final       CT Head Without Contrast    Result Date: 5/13/2024   Redemonstration of the basal ganglia hemorrhage, with little apparent change, as described. Chronic ischemic changes of the brain. If there is further clinical concern, MRI could be considered for further evaluation.   This report was finalized on 5/13/2024 4:24 PM by Dr. Fitz Solano M.D on Workstation: [employer]       I have personally reviewed all medications:  Scheduled Medications  amLODIPine, 10 mg, Oral, Q24H  atorvastatin, 20 mg, Oral, Nightly  carvedilol, 6.25 mg, Oral, BID With Meals  fluticasone, 2 spray, Each Nare, Daily  hydroCHLOROthiazide Oral, 12.5 mg, Oral, Daily  losartan, 50 mg, Oral, Q24H  pantoprazole, 40 mg, Oral, Q AM  senna-docusate sodium, 2 tablet, Oral, BID  sodium chloride, 10 mL, Intravenous, Q12H    Infusions   Diet  Diet: Regular/House; No Mixed Consistencies; Texture: Soft to Chew (NDD 3); Soft to Chew: Chopped Meat; Fluid Consistency: Nectar Thick    I have personally reviewed:  [x]  Laboratory   []  Microbiology   [x]  Radiology   [x]  EKG/Telemetry  []  Cardiology/Vascular   []  Pathology    []  Records       Assessment/Plan     Active Hospital Problems    Diagnosis  POA    **Intraparenchymal hemorrhage of brain [I61.9]  Yes    Prediabetes [R73.03]  Unknown    CKD (chronic kidney disease) stage 3, GFR 30-59 ml/min [N18.30]  Yes    Hypertension [I10]  Yes    Hyperlipidemia [E78.5]  Yes      Resolved Hospital Problems   No resolved problems to display.       64yo gentleman with atrial fibrillation (not on anticoagulation prior to admission), hypertension, and CKD3, who presented to the ER with acute right-sided weakness and slurred speech. He was admitted to ICU with left intraparenchymal hemorrhage. We were asked to take over when pt transitioned out of ICU.     Left subcortical intracerebral hemorrhage with residual right-sided weakness  -Etiology most likely  "hypertensive   -Neuro, Card, and ALEXY following  -SBP goal <150  -Status post Cardene drip  -BPs acceptable on HCTZ, amlodipine, carvedilol, and losartan.    -Cardiology managing BP  -Repeat CT Head yesterday was stable     Atrial fibrillation  RBBB and LAFB  -Rate controlled with Coreg, not on anticoagulation secondary to above  -Cardiology following  -Can discuss AC further as outpt      GERD  -continue PPI    Dysphagia  -Tolerating modified diet per SLP recs:  \"nectar thick liquids and soft solids no mixed consistencies. Medications whole or crushed with purée. Recommend frequent oral care with water/ice chips between meals. Recommend upright, slow rate, double swallow and alternate liquids and solids\"     UTI?  -Urinalysis was positive for leukocytes, RBC, WBC  -Urine culture grew Enterobacter  -Completed 3d course of CTX     Prediabetes  -A1c 6.1   -Not on meds PTA  -Sugars acceptable  -Continue SSI     CKD3  -Cr wnl here  -Continue to monitor     Hyperlipidemia  -Total cholesterol of 233,   -Started atorvastatin here    Nasal decongestant spray dependence  -Pt asking staff to place him on nasal cannula O2 for this reason    RUE weakness, edema, and shoulder pain  -May have mild strain of right shoulder  -Check XR  -Will also need to check U/S to r/o DVT    SCDs for DVT prophylaxis.  Full code.  Discussed with patient, nursing staff, CCP, and care team on multidisciplinary rounds. D/w sister at bedside. D/w Dr. Khalil.  Anticipate discharge to SNU facility once arrangements have been made.  Expected Discharge Date: 5/15/2024; Expected Discharge Time: TBD      Milton Prasad MD  [facility]  05/14/24  12:43 EDT      "

## 2024-05-14 NOTE — PROGRESS NOTES
"DOS: 2024  NAME: Semaj Narvaez   : 1960  PCP: Jessica Mishra APRN  Chief Complaint   Patient presents with    Extremity Weakness     Via ems, pt started having R sided leg and arm weakness at . Pt also was having slurred speech, via ems. Team D called at . Pt has 18g iv in L ac from ems.       Chief complaint: right sided weakness  Subjective: Staff yesterday were concerned that right arm weakness was worse.  Repeat head CT was done which was stable.  Today complains of persistent right arm weakness as well as pain in the shoulder more proximally.  Right leg however is getting stronger.    Objective:  Vital signs: /99 (BP Location: Left arm, Patient Position: Lying)   Pulse 66   Temp 97.5 °F (36.4 °C) (Oral)   Resp 18   Ht 168 cm (66.14\")   Wt 130 kg (286 lb 13.1 oz)   SpO2 96%   BMI 46.10 kg/m²    Gen: NAD, vitals reviewed  MS: Lethargic but oriented x3, recent/remote memory intact, normal attention/concentration, language intact, no neglect.  CN: visual acuity grossly normal, PERRL, EOMI, right facial droop, severe dysarthria  Motor: 4/5 right upper extremity, 4+/5 right lower extremity, 5/5 left upper and lower extremities    Laboratory results:  Lab Results   Component Value Date    GLUCOSE 114 (H) 2024    CALCIUM 9.0 2024     2024    K 4.2 2024    CO2 25.4 2024     2024    BUN 19 2024    CREATININE 1.12 2024    EGFRIFAFRI 74 2021    EGFRIFNONA 64 2021    BCR 17.0 2024    ANIONGAP 12.6 2024     Lab Results   Component Value Date    WBC 10.94 (H) 2024    HGB 15.2 2024    HCT 45.1 2024    MCV 95.3 2024     2024     Lab Results   Component Value Date     (H) 05/10/2024     (H) 2024     (H) 2023         Lab 05/10/24  0204   HEMOGLOBIN A1C 6.10*        Review of labs: WBC 11, BMP unremarkable    Review and interpretation of " imaging: I personally reviewed his follow-up head CT done yesterday which shows stable left putaminal hemorrhage.  Radiology report reviewed.    Diagnoses:  Hypertensive left subcortical intracerebral hemorrhage, stable  Right hemiparesis, stable    Comment: To me the patient's exam is fairly stable today and CT looks unchanged    Plan:  1.  Normalize blood pressure  2.  Needs referral to sleep medicine on discharge  3.  High-dose statin    Management discussed with Dr. Prasad. I'll continue to see him prn.

## 2024-05-14 NOTE — SIGNIFICANT NOTE
05/14/24 1532   OTHER   Discipline occupational therapist   Rehab Time/Intention   Session Not Performed patient unavailable for treatment  (Out of room with attempt. Will follow up)   Recommendation   OT - Next Appointment 05/15/24

## 2024-05-15 LAB
ALBUMIN SERPL-MCNC: 4.1 G/DL (ref 3.5–5.2)
ALP SERPL-CCNC: 74 U/L (ref 39–117)
ALT SERPL W P-5'-P-CCNC: 16 U/L (ref 1–41)
ANION GAP SERPL CALCULATED.3IONS-SCNC: 9 MMOL/L (ref 5–15)
AST SERPL-CCNC: 13 U/L (ref 1–40)
BILIRUB CONJ SERPL-MCNC: <0.2 MG/DL (ref 0–0.3)
BILIRUB INDIRECT SERPL-MCNC: NORMAL MG/DL
BILIRUB SERPL-MCNC: 0.5 MG/DL (ref 0–1.2)
BUN SERPL-MCNC: 21 MG/DL (ref 8–23)
BUN/CREAT SERPL: 22.1 (ref 7–25)
CALCIUM SPEC-SCNC: 9 MG/DL (ref 8.6–10.5)
CHLORIDE SERPL-SCNC: 101 MMOL/L (ref 98–107)
CO2 SERPL-SCNC: 28 MMOL/L (ref 22–29)
CREAT SERPL-MCNC: 0.95 MG/DL (ref 0.76–1.27)
DEPRECATED RDW RBC AUTO: 42.6 FL (ref 37–54)
EGFRCR SERPLBLD CKD-EPI 2021: 88.8 ML/MIN/1.73
ERYTHROCYTE [DISTWIDTH] IN BLOOD BY AUTOMATED COUNT: 12.3 % (ref 12.3–15.4)
GLUCOSE BLDC GLUCOMTR-MCNC: 104 MG/DL (ref 70–130)
GLUCOSE BLDC GLUCOMTR-MCNC: 129 MG/DL (ref 70–130)
GLUCOSE BLDC GLUCOMTR-MCNC: 129 MG/DL (ref 70–130)
GLUCOSE BLDC GLUCOMTR-MCNC: 98 MG/DL (ref 70–130)
GLUCOSE SERPL-MCNC: 106 MG/DL (ref 65–99)
HCT VFR BLD AUTO: 45.7 % (ref 37.5–51)
HGB BLD-MCNC: 15.5 G/DL (ref 13–17.7)
MAGNESIUM SERPL-MCNC: 2.2 MG/DL (ref 1.6–2.4)
MCH RBC QN AUTO: 32.4 PG (ref 26.6–33)
MCHC RBC AUTO-ENTMCNC: 33.9 G/DL (ref 31.5–35.7)
MCV RBC AUTO: 95.6 FL (ref 79–97)
PHOSPHATE SERPL-MCNC: 3.5 MG/DL (ref 2.5–4.5)
PLATELET # BLD AUTO: 273 10*3/MM3 (ref 140–450)
PMV BLD AUTO: 10.6 FL (ref 6–12)
POTASSIUM SERPL-SCNC: 4 MMOL/L (ref 3.5–5.2)
PROT SERPL-MCNC: 6.3 G/DL (ref 6–8.5)
RBC # BLD AUTO: 4.78 10*6/MM3 (ref 4.14–5.8)
SODIUM SERPL-SCNC: 138 MMOL/L (ref 136–145)
WBC NRBC COR # BLD AUTO: 9.9 10*3/MM3 (ref 3.4–10.8)

## 2024-05-15 PROCEDURE — 92526 ORAL FUNCTION THERAPY: CPT

## 2024-05-15 PROCEDURE — 83735 ASSAY OF MAGNESIUM: CPT | Performed by: HOSPITALIST

## 2024-05-15 PROCEDURE — 82948 REAGENT STRIP/BLOOD GLUCOSE: CPT

## 2024-05-15 PROCEDURE — 85027 COMPLETE CBC AUTOMATED: CPT | Performed by: HOSPITALIST

## 2024-05-15 PROCEDURE — 80048 BASIC METABOLIC PNL TOTAL CA: CPT | Performed by: INTERNAL MEDICINE

## 2024-05-15 PROCEDURE — 97530 THERAPEUTIC ACTIVITIES: CPT

## 2024-05-15 PROCEDURE — 84100 ASSAY OF PHOSPHORUS: CPT | Performed by: HOSPITALIST

## 2024-05-15 PROCEDURE — 80076 HEPATIC FUNCTION PANEL: CPT | Performed by: HOSPITALIST

## 2024-05-15 PROCEDURE — 99232 SBSQ HOSP IP/OBS MODERATE 35: CPT | Performed by: INTERNAL MEDICINE

## 2024-05-15 RX ORDER — AMOXICILLIN 250 MG
2 CAPSULE ORAL 2 TIMES DAILY
Status: DISCONTINUED | OUTPATIENT
Start: 2024-05-15 | End: 2024-05-16 | Stop reason: HOSPADM

## 2024-05-15 RX ORDER — BISACODYL 10 MG
10 SUPPOSITORY, RECTAL RECTAL ONCE
Status: DISCONTINUED | OUTPATIENT
Start: 2024-05-15 | End: 2024-05-16 | Stop reason: HOSPADM

## 2024-05-15 RX ORDER — POLYETHYLENE GLYCOL 3350 17 G/17G
17 POWDER, FOR SOLUTION ORAL DAILY
Status: DISCONTINUED | OUTPATIENT
Start: 2024-05-15 | End: 2024-05-16 | Stop reason: HOSPADM

## 2024-05-15 RX ADMIN — PANTOPRAZOLE SODIUM 40 MG: 40 TABLET, DELAYED RELEASE ORAL at 05:40

## 2024-05-15 RX ADMIN — Medication 10 ML: at 21:53

## 2024-05-15 RX ADMIN — AMLODIPINE BESYLATE 10 MG: 10 TABLET ORAL at 08:58

## 2024-05-15 RX ADMIN — CARVEDILOL 6.25 MG: 6.25 TABLET, FILM COATED ORAL at 18:18

## 2024-05-15 RX ADMIN — SENNOSIDES AND DOCUSATE SODIUM 2 TABLET: 50; 8.6 TABLET ORAL at 21:52

## 2024-05-15 RX ADMIN — CARVEDILOL 6.25 MG: 6.25 TABLET, FILM COATED ORAL at 08:58

## 2024-05-15 RX ADMIN — FLUTICASONE PROPIONATE 2 SPRAY: 50 SPRAY, METERED NASAL at 08:59

## 2024-05-15 RX ADMIN — HYDROCHLOROTHIAZIDE 12.5 MG: 12.5 TABLET ORAL at 08:58

## 2024-05-15 RX ADMIN — LOSARTAN POTASSIUM 50 MG: 50 TABLET, FILM COATED ORAL at 08:58

## 2024-05-15 RX ADMIN — POLYETHYLENE GLYCOL 3350 17 G: 17 POWDER, FOR SOLUTION ORAL at 12:59

## 2024-05-15 RX ADMIN — ATORVASTATIN CALCIUM 20 MG: 20 TABLET, FILM COATED ORAL at 21:52
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
## 2024-05-15 NOTE — PROGRESS NOTES
[facility] Acute Inpt Rehab    Reviewed with Dr. Branch.  Patient does not meet criteria for acute inpt rehab.  Will sign off at this time.  Please call if further assistance is needed.    Thank you,  Jennifer Muñiz, RN  Rehab Admission Nurse
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
## 2024-05-15 NOTE — PROGRESS NOTES
Name: Semaj Narvaez ADMIT: 2024   : 1960  PCP: Jessica Mishra APRN    MRN: [id number] LOS: 6 days   AGE/SEX: 63 y.o. male  ROOM: [facility]     Subjective   Subjective   Pt c/o constipation today--says he feels miserable. No other changes. Voiding well. Tolerating modified diet. No SOA or CP or palp. No N/V/D. No F/C/NS. No HA or vis changes.       Objective   Objective   Vital Signs  Temp:  [97.3 °F (36.3 °C)-98.1 °F (36.7 °C)] 97.8 °F (36.6 °C)  Heart Rate:  [57-70] 64  Resp:  [18] 18  BP: (115-157)/(75-92) 123/92  SpO2:  [93 %-100 %] 99 %  on  Flow (L/min):  [2-3] 2;   Device (Oxygen Therapy): nasal cannula  Body mass index is 45.28 kg/m².    (No change in exam today)    Physical Exam  Vitals and nursing note reviewed.   Constitutional:       General: He is not in acute distress.     Appearance: He is obese. He is ill-appearing (chronically). He is not toxic-appearing or diaphoretic.   HENT:      Nose: Nose normal.      Mouth/Throat:      Mouth: Mucous membranes are moist.      Pharynx: Oropharynx is clear.   Eyes:      General: No scleral icterus.        Right eye: No discharge.         Left eye: No discharge.      Conjunctiva/sclera: Conjunctivae normal.   Cardiovascular:      Rate and Rhythm: Normal rate. Rhythm irregular.      Pulses: Normal pulses.   Pulmonary:      Effort: Pulmonary effort is normal. No respiratory distress.      Breath sounds: Normal breath sounds. No wheezing or rales.   Abdominal:      General: Bowel sounds are normal. There is no distension.      Palpations: Abdomen is soft.      Tenderness: There is no abdominal tenderness.   Musculoskeletal:         General: No swelling or deformity.      Cervical back: Neck supple.      Comments: No TTP or deformity of right shoulder  PROM is painful at right shoulder  Mild edema of RUE   Skin:     General: Skin is warm and dry.      Capillary Refill: Capillary refill takes less than 2 seconds.      Coloration: Skin is not jaundiced.    Neurological:      Mental Status: He is alert.      Comments: Right facial droop, slurred speech  Cannot lift RUE against gravity  RLE sl weaker than LLE   Psychiatric:         Mood and Affect: Mood normal.         Behavior: Behavior normal.         Thought Content: Thought content normal.       Results Review     I reviewed the patient's new clinical results.  Results from last 7 days   Lab Units 05/15/24  0346 05/14/24  0637 05/12/24  0516 05/11/24  0210   WBC 10*3/mm3 9.90 10.94* 11.42* 9.59   HEMOGLOBIN g/dL 15.5 15.2 15.5 14.8   PLATELETS 10*3/mm3 273 273 271 280     Results from last 7 days   Lab Units 05/15/24  0346 05/14/24  0637 05/13/24  0336 05/12/24  0516   SODIUM mmol/L 138 139 139 137   POTASSIUM mmol/L 4.0 4.2 4.0 4.0   CHLORIDE mmol/L 101 101 101 101   CO2 mmol/L 28.0 25.4 27.0 24.0   BUN mg/dL 21 19 15 9   CREATININE mg/dL 0.95 1.12 0.98 0.91   GLUCOSE mg/dL 106* 114* 100* 106*   EGFR mL/min/1.73 88.8 72.9 85.6 93.5     Results from last 7 days   Lab Units 05/15/24  0346 05/14/24  0637 05/09/24  2218   ALBUMIN g/dL 4.1 3.6 4.3   BILIRUBIN mg/dL 0.5 0.5 0.3   ALK PHOS U/L 74 77 80   AST (SGOT) U/L 13 11 12   ALT (SGPT) U/L 16 18 16     Results from last 7 days   Lab Units 05/15/24  0346 05/14/24  0637 05/13/24  0336 05/12/24  0516 05/10/24  0204 05/09/24  2218   CALCIUM mg/dL 9.0 9.0 8.8 8.7   < > 8.9   ALBUMIN g/dL 4.1 3.6  --   --   --  4.3   MAGNESIUM mg/dL 2.2 2.0  --   --   --   --    PHOSPHORUS mg/dL 3.5 3.9  --   --   --   --     < > = values in this interval not displayed.       Glucose   Date/Time Value Ref Range Status   05/15/2024 1138 129 70 - 130 mg/dL Final   05/15/2024 0611 104 70 - 130 mg/dL Final   05/14/2024 2030 127 70 - 130 mg/dL Final   05/14/2024 1611 148 (H) 70 - 130 mg/dL Final   05/14/2024 1115 274 (H) 70 - 130 mg/dL Final   05/14/2024 0558 106 70 - 130 mg/dL Final   05/13/2024 2100 138 (H) 70 - 130 mg/dL Final       CT Head Without Contrast    Result Date: 5/13/2024    Redemonstration of the basal ganglia hemorrhage, with little apparent change, as described. Chronic ischemic changes of the brain. If there is further clinical concern, MRI could be considered for further evaluation.   This report was finalized on 5/13/2024 4:24 PM by Dr. Fitz Solano M.D on Workstation: [id number]       I have personally reviewed all medications:  Scheduled Medications  amLODIPine, 10 mg, Oral, Q24H  atorvastatin, 20 mg, Oral, Nightly  carvedilol, 6.25 mg, Oral, BID With Meals  fluticasone, 2 spray, Each Nare, Daily  hydroCHLOROthiazide Oral, 12.5 mg, Oral, Daily  losartan, 50 mg, Oral, Q24H  pantoprazole, 40 mg, Oral, Q AM  senna-docusate sodium, 2 tablet, Oral, BID  sodium chloride, 10 mL, Intravenous, Q12H    Infusions   Diet  Diet: Regular/House; No Mixed Consistencies; Texture: Soft to Chew (NDD 3); Soft to Chew: Chopped Meat; Fluid Consistency: Nectar Thick    I have personally reviewed:  [x]  Laboratory   []  Microbiology   [x]  Radiology   [x]  EKG/Telemetry  []  Cardiology/Vascular   []  Pathology    []  Records       Assessment/Plan     Active Hospital Problems    Diagnosis  POA    **Intraparenchymal hemorrhage of brain [I61.9]  Yes    Prediabetes [R73.03]  Unknown    CKD (chronic kidney disease) stage 3, GFR 30-59 ml/min [N18.30]  Yes    Hypertension [I10]  Yes    Hyperlipidemia [E78.5]  Yes      Resolved Hospital Problems   No resolved problems to display.       64yo gentleman with atrial fibrillation (not on anticoagulation prior to admission), hypertension, and CKD3, who presented to the ER with acute right-sided weakness and slurred speech. He was admitted to ICU with left intraparenchymal hemorrhage. We were asked to take over when pt transitioned out of ICU.     Left subcortical intracerebral hemorrhage with residual right-sided weakness  -Etiology most likely hypertensive   -Neuro, Card, and ALEXY following  -SBP goal <150  -BPs acceptable on HCTZ, amlodipine, carvedilol, and  "losartan.    -Cardiology managing BP  -Repeat CT Head 5/13 was stable     Atrial fibrillation  RBBB and LAFB  -Rate controlled with Coreg, not on anticoagulation secondary to above  -Cardiology following  -Can discuss AC further as outpt      GERD  -continue PPI    Dysphagia  -Tolerating modified diet per SLP recs:  \"nectar thick liquids and soft solids no mixed consistencies. Medications whole or crushed with purée. Recommend frequent oral care with water/ice chips between meals. Recommend upright, slow rate, double swallow and alternate liquids and solids\"     UTI?  -Urinalysis was positive for leukocytes, RBC, WBC  -Urine culture grew Enterobacter  -Completed 3d course of CTX     Prediabetes  -A1c 6.1   -Not on meds PTA  -Sugars acceptable  -Continue SSI     CKD3  -Cr wnl here  -Continue to monitor     Hyperlipidemia  -Total cholesterol of 233,   -Started atorvastatin here    Nasal decongestant spray dependence  -Pt asking staff to place him on nasal cannula O2 for this reason    RUE weakness, edema, and shoulder pain  -Suspect mild strain of right shoulder  -Xray showed NAD  -No DVT on U/S  -Continue PT  -Consider outpt Ortho eval if persists  -D/w PT    Constipation  -Adjust bowel regimen and give a Dulcolax supp today      SCDs for DVT prophylaxis.  Full code.  Discussed with patient, nursing staff, CCP, and care team on multidisciplinary rounds. No family present.  Anticipate discharge to SNU facility once arrangements have been made.  Expected Discharge Date: 5/16/2024; Expected Discharge Time: TBD      Milton Prasad MD  [facility]  05/15/24  12:43 EDT      "

## 2024-05-15 NOTE — PLAN OF CARE
Goal Outcome Evaluation:  Plan of Care Reviewed With: patient        Progress: improving  Outcome Evaluation: No new issues overnight. WCTM

## 2024-05-15 NOTE — PROGRESS NOTES
"Enter Query Response Below      Query Response: Unable to determine              If applicable, please update the problem list.     Patient: Semaj Narvaez        : 1960  Account: [id number]           Admit Date:         How to Respond to this query:       a. Click New Note     b. Answer query within the yellow box.                c. Update the Problem List, if applicable.      If you have any questions about this query contact me at: [email or web address]    Dr. Prasad,     Patient admitted with intracerebral bleed.  UA-no bacteria, WBC 11-20, moderate leukocytes, negative nitrite; urine culture positive for enterobacter cloacae. Treatment includes rocephin x 3 days.  Per progress notes \"UTI?\".    Please clarify the following:  UTI ruled in  UTI ruled out  Other- specify______  Unable to determine    By submitting this query, we are merely seeking further clarification of documentation to accurately reflect all conditions that you are monitoring, evaluating, treating or that extend the hospitalization or utilize additional resources of care. Please utilize your independent clinical judgment when addressing the question(s) above.     This query and your response, once completed, will be entered into the legal medical record.    Sincerely,  Toña Hall RN BSN  Clinical Documentation Integrity Program   "

## 2024-05-15 NOTE — PROGRESS NOTES
LOS: 6 days   Patient Care Team:  Jessica Mishra APRN as PCP - General (Nurse Practitioner)    Chief Complaint: Follow-up hypertensive emergency, intraparenchymal hemorrhage.    Interval History: Still in rate controlled atrial fibrillation.  He is very fatigued.  No chest pain.    Vital Signs:  Temp:  [97.3 °F (36.3 °C)-98.6 °F (37 °C)] 98.6 °F (37 °C)  Heart Rate:  [57-70] 67  Resp:  [16-18] 16  BP: (103-157)/(64-92) 103/64    Intake/Output Summary (Last 24 hours) at 5/15/2024 1456  Last data filed at 5/15/2024 0600  Gross per 24 hour   Intake 180 ml   Output 800 ml   Net -620 ml       Physical Exam:   General Appearance:    No acute distress, alert   Lungs:     Decreased breath sounds bilaterally.     Heart:    Irregularly irregular rhythm and normal rate.  No murmurs, gallops, or rubs noted.    Abdomen:     Soft, nontender, nondistended.    Extremities:   No clubbing, cyanosis, or edema.     Results Review:    Results from last 7 days   Lab Units 05/15/24  0346   SODIUM mmol/L 138   POTASSIUM mmol/L 4.0   CHLORIDE mmol/L 101   CO2 mmol/L 28.0   BUN mg/dL 21   CREATININE mg/dL 0.95   GLUCOSE mg/dL 106*   CALCIUM mg/dL 9.0     Results from last 7 days   Lab Units 05/09/24  2218   HSTROP T ng/L 17     Results from last 7 days   Lab Units 05/15/24  0346   WBC 10*3/mm3 9.90   HEMOGLOBIN g/dL 15.5   HEMATOCRIT % 45.7   PLATELETS 10*3/mm3 273     Results from last 7 days   Lab Units 05/09/24  2218   INR  1.00   APTT seconds 26.1     Results from last 7 days   Lab Units 05/10/24  0204   CHOLESTEROL mg/dL 233*     Results from last 7 days   Lab Units 05/15/24  0346   MAGNESIUM mg/dL 2.2     Results from last 7 days   Lab Units 05/10/24  0204   CHOLESTEROL mg/dL 233*   TRIGLYCERIDES mg/dL 150   HDL CHOL mg/dL 39*   LDL CHOL mg/dL 166*       I reviewed the patient's new clinical results.        Assessment:  1.  Acute left basal ganglia intraparenchymal hemorrhage  2.  Hypertensive emergency  3.  Persistent atrial  fibrillation  4.  Bifascicular block (RBBB, LAFB)  5.  Morbid obesity, complicating all aspects of care  6.  History of previous strokes  7.  Hyperlipidemia    Plan:  -Continue losartan 50 mg/day, HCTZ 12.5 mg/day, amlodipine 10 mg/day, and carvedilol 6.25 mg twice daily.  Blood pressure is much better controlled.    -Atrial fibrillation rate is controlled.  Continue Coreg.    -No anticoagulation for obvious reasons.    -Agree with need for sleep referral as he very likely has undiagnosed obstructive sleep apnea.    -Agree with initiation of Lipitor 20 mg/day.  LDL was 166.    -With regards to Watchman, he would still need to be on systemic anticoagulation for 45 days afterwards.  This can be evaluated further as an outpatient, although he would not be a candidate currently with an active brain bleed.    -Fairly stable from a cardiac standpoint.  Cardiology will sign off.  Please call back if needed.  He already has follow-up scheduled with Dr. Castano later this month.    Timothy Love MD  05/15/24  14:56 EDT

## 2024-05-15 NOTE — PLAN OF CARE
Goal Outcome Evaluation:              Outcome Evaluation: Clinical swallow re-assessment completed. Patient agreeable to therapy. Limited trials accepted due to c/o discomfort secondary to constipation. Exhibited immediate cough with thin liquids. No overt s/sx of aspiration or penetration observed with nectar via cup/straw, puree, soft, or regular solids. Mastication slightly prolonged with regular solids. Utilized nectar thick liquid wash to aid in bolus formation. SLP reviewed results from VFSS, diet recommendations, and swallow strategies to use with meals. Patient verbalized understanding. Ongoing education recommended. SLP recs; soft, chopped (NO mixed) and nectar thick liquids. Medication with applesauce or thickened liquids. Allow for water/ice 30 minutes before/after meals for water protocol. Will for for dysphagia therapy and re-evaluation.      Anticipated Discharge Disposition (SLP): skilled nursing facility          SLP Swallowing Diagnosis: mild, oral dysphagia, pharyngeal dysphagia (05/15/24 1350)

## 2024-05-16 ENCOUNTER — READMISSION MANAGEMENT (OUTPATIENT)
Dept: CALL CENTER | Facility: HOSPITAL | Age: 64
End: 2024-05-16
Payer: MEDICARE

## 2024-05-16 VITALS
RESPIRATION RATE: 18 BRPM | SYSTOLIC BLOOD PRESSURE: 120 MMHG | BODY MASS INDEX: 44.47 KG/M2 | DIASTOLIC BLOOD PRESSURE: 77 MMHG | HEIGHT: 66 IN | WEIGHT: 276.68 LBS | HEART RATE: 69 BPM | TEMPERATURE: 97.5 F | OXYGEN SATURATION: 94 %

## 2024-05-16 LAB
ANION GAP SERPL CALCULATED.3IONS-SCNC: 11 MMOL/L (ref 5–15)
BUN SERPL-MCNC: 22 MG/DL (ref 8–23)
BUN/CREAT SERPL: 24.2 (ref 7–25)
CALCIUM SPEC-SCNC: 9.2 MG/DL (ref 8.6–10.5)
CHLORIDE SERPL-SCNC: 101 MMOL/L (ref 98–107)
CO2 SERPL-SCNC: 28 MMOL/L (ref 22–29)
CREAT BLDA-MCNC: 1.3 MG/DL (ref 0.6–1.3)
CREAT SERPL-MCNC: 0.91 MG/DL (ref 0.76–1.27)
EGFRCR SERPLBLD CKD-EPI 2021: 93.5 ML/MIN/1.73
GLUCOSE BLDC GLUCOMTR-MCNC: 134 MG/DL (ref 70–130)
GLUCOSE BLDC GLUCOMTR-MCNC: 158 MG/DL (ref 70–130)
GLUCOSE BLDC GLUCOMTR-MCNC: 98 MG/DL (ref 70–130)
GLUCOSE SERPL-MCNC: 104 MG/DL (ref 65–99)
POTASSIUM SERPL-SCNC: 3.7 MMOL/L (ref 3.5–5.2)
SODIUM SERPL-SCNC: 140 MMOL/L (ref 136–145)

## 2024-05-16 PROCEDURE — 80048 BASIC METABOLIC PNL TOTAL CA: CPT | Performed by: INTERNAL MEDICINE

## 2024-05-16 PROCEDURE — 82948 REAGENT STRIP/BLOOD GLUCOSE: CPT

## 2024-05-16 RX ORDER — ATORVASTATIN CALCIUM 20 MG/1
20 TABLET, FILM COATED ORAL NIGHTLY
Qty: 90 TABLET | Refills: 0 | Status: SHIPPED | OUTPATIENT
Start: 2024-05-16

## 2024-05-16 RX ORDER — CARVEDILOL 6.25 MG/1
6.25 TABLET ORAL 2 TIMES DAILY WITH MEALS
Qty: 60 TABLET | Refills: 0 | Status: SHIPPED | OUTPATIENT
Start: 2024-05-16

## 2024-05-16 RX ORDER — AMLODIPINE BESYLATE 10 MG/1
10 TABLET ORAL
Qty: 30 TABLET | Refills: 0 | Status: SHIPPED | OUTPATIENT
Start: 2024-05-17

## 2024-05-16 RX ADMIN — Medication 10 ML: at 09:59

## 2024-05-16 RX ADMIN — CARVEDILOL 6.25 MG: 6.25 TABLET, FILM COATED ORAL at 09:58

## 2024-05-16 RX ADMIN — PANTOPRAZOLE SODIUM 40 MG: 40 TABLET, DELAYED RELEASE ORAL at 06:05

## 2024-05-16 RX ADMIN — LOSARTAN POTASSIUM 50 MG: 50 TABLET, FILM COATED ORAL at 09:58

## 2024-05-16 RX ADMIN — HYDROCHLOROTHIAZIDE 12.5 MG: 12.5 TABLET ORAL at 09:58

## 2024-05-16 RX ADMIN — SENNOSIDES AND DOCUSATE SODIUM 2 TABLET: 50; 8.6 TABLET ORAL at 09:58

## 2024-05-16 RX ADMIN — AMLODIPINE BESYLATE 10 MG: 10 TABLET ORAL at 09:58

## 2024-05-16 RX ADMIN — FLUTICASONE PROPIONATE 2 SPRAY: 50 SPRAY, METERED NASAL at 09:59

## 2024-05-16 NOTE — DISCHARGE SUMMARY
Date of Discharge:  5/16/2024    PCP: Jessica Mishra APRN    Discharge Diagnosis:   Active Hospital Problems    Diagnosis  POA    **Intraparenchymal hemorrhage of brain [I61.9]  Yes    Prediabetes [R73.03]  Unknown    CKD (chronic kidney disease) stage 3, GFR 30-59 ml/min [N18.30]  Yes    Hypertension [I10]  Yes    Hyperlipidemia [E78.5]  Yes      Resolved Hospital Problems   No resolved problems to display.          Consults       Date and Time Order Name Status Description    5/11/2024  3:55 PM Inpatient Hospitalist Consult      5/10/2024  6:19 AM Inpatient Cardiology Consult Completed     5/10/2024  1:39 AM Inpatient Neurosurgery Consult Completed     5/9/2024 10:42 PM Neurosurgery (on-call MD unless specified) Completed     5/9/2024 10:16 PM Inpatient Neurology Consult Stroke Completed           Hospital Course  63 y.o. male   64yo gentleman with atrial fibrillation (not on anticoagulation prior to admission), hypertension, and CKD3, who presented to the ER with acute right-sided weakness and slurred speech. He was admitted to ICU with left intraparenchymal hemorrhage. We were asked to take over when pt transitioned out of ICU.  Patient had left subcortical intracerebral hemorrhage with residual right-sided weakness.  Patient has remained stable, blood pressure has also remained stable.  Patient also had atrial fibrillation, currently rate controlled with Coreg, not on anticoagulation.  He will follow-up on outpatient basis with PCP, and with cardiology to discuss anticoagulation.  Patient also had history of GERD, prediabetes, chronic medical conditions.  Overall he has been accepted at rehab facility today.  He will be discharged in a stable condition.  I have seen and examined patient at bedside, total time spent on discharge management is more than 30 minutes.       Temp:  [97.5 °F (36.4 °C)-98.6 °F (37 °C)] 97.9 °F (36.6 °C)  Heart Rate:  [59-70] 59  Resp:  [16-18] 18  BP: (103-137)/(64-89) 131/78  Body  mass index is 44.47 kg/m².    Physical Exam  Vitals and nursing note reviewed.   Constitutional:       General: He is not in acute distress.     Appearance: He is obese. He is ill-appearing (chronically). He is not toxic-appearing or diaphoretic.   HENT:      Nose: Nose normal.      Mouth/Throat:      Mouth: Mucous membranes are moist.      Pharynx: Oropharynx is clear.   Eyes:      General: No scleral icterus.        Right eye: No discharge.         Left eye: No discharge.      Conjunctiva/sclera: Conjunctivae normal.   Cardiovascular:      Rate and Rhythm: Normal rate. Rhythm irregular.      Pulses: Normal pulses.   Pulmonary:      Effort: Pulmonary effort is normal. No respiratory distress.      Breath sounds: Normal breath sounds. No wheezing or rales.   Abdominal:      General: Bowel sounds are normal. There is no distension.      Palpations: Abdomen is soft.      Tenderness: There is no abdominal tenderness.   Musculoskeletal:         General: No swelling or deformity.      Cervical back: Neck supple.      Comments: No TTP or deformity of right shoulder  PROM is painful at right shoulder  Mild edema of RUE   Skin:     General: Skin is warm and dry.      Capillary Refill: Capillary refill takes less than 2 seconds.      Coloration: Skin is not jaundiced.   Neurological:      Mental Status: He is alert.      Comments: Right facial droop, slurred speech  Cannot lift RUE against gravity  RLE sl weaker than LLE   Psychiatric:         Mood and Affect: Mood normal.         Behavior: Behavior normal.         Thought Content: Thought content normal.     Disposition: Home or Self Care       Discharge Medications        New Medications        Instructions Start Date   amLODIPine 10 MG tablet  Commonly known as: NORVASC   10 mg, Oral, Every 24 Hours Scheduled   Start Date: May 17, 2024     atorvastatin 20 MG tablet  Commonly known as: LIPITOR   20 mg, Oral, Nightly      carvedilol 6.25 MG tablet  Commonly known as:  COREG   6.25 mg, Oral, 2 Times Daily With Meals             Continue These Medications        Instructions Start Date   fish oil 1000 MG capsule capsule   1,000 mg, Oral, Daily With Breakfast      fluticasone 50 MCG/ACT nasal spray  Commonly known as: FLONASE   2 sprays, Nasal, Daily      Garlic 10 MG capsule   Oral      losartan 50 MG tablet  Commonly known as: Cozaar   50 mg, Oral, Daily      omeprazole OTC 20 MG EC tablet  Commonly known as: PriLOSEC OTC   20 mg, Oral, Daily             Stop These Medications      aspirin 325 MG EC tablet     hydroCHLOROthiazide 12.5 MG tablet     metoprolol tartrate 25 MG tablet  Commonly known as: LOPRESSOR               Additional Instructions for the Follow-ups that You Need to Schedule       Ambulatory Referral to Sleep Medicine   As directed      Follow-up needed: Yes        Discharge Follow-up with PCP   As directed       Currently Documented PCP:    Jessica Mishra APRN    PCP Phone Number:    [phone]     Follow Up Details: Follow-up with PCP in 7 days.               Contact information for follow-up providers       Kamar Farrell MD Follow up.    Specialties: Sleep Medicine, Pulmonary Disease, Intensive Care  Why: Sleep lab after sleep study  Contact information:  [address]  [place] [id number]  [phone]               Jessica Mishra APRN .    Specialties: Nurse Practitioner, Family Medicine  Why: Follow-up with PCP in 7 days.  Contact information:  [address]  [place] [address]  [phone]                       Contact information for after-discharge care       Destination       [facility] .    Service: Skilled Nursing  Contact information:  [address] [place] [id number]  [phone]                                  Future Appointments   Date Time Provider Department Center   5/30/2024 12:40 PM Salbador Castano MD [facility] CD [facility] [place]   6/11/2024  7:30 PM [facility] SLEEP ROOMS [facility] SLEEP  [place] Cosme MD  [facility]  05/16/24    Discharge time spent greater than 30 minutes.

## 2024-05-16 NOTE — OUTREACH NOTE
Prep Survey      Flowsheet Row Responses   [facility] facility patient discharged from? [facility]   Is LACE score < 7 ? No   Eligibility Not Eligible   What are the reasons patient is not eligible? [facility]   Does the patient have one of the following disease processes/diagnoses(primary or secondary)? Stroke   Prep survey completed? Yes            LAY GARCIA - Registered Nurse

## 2024-05-16 NOTE — CASE MANAGEMENT/SOCIAL WORK
Continued Stay Note  [facility]     Patient Name: Semaj Narvaez  MRN: [id number]  Today's Date: 5/16/2024    Admit Date: 5/9/2024    Plan: [facility] SNF via [employer] stretcher van at 5PM today   Discharge Plan       Row Name 05/16/24 1319       Plan    Plan [facility] SNF via [employer] stretcher van at 5PM today    Plan Comments CCP notified by [facility] that bed is available and they can accept the patient today. CCP spoke to the patient's sister Ade to confirm discharge planning. CCP booked stretcher transport for this evening with [facility] at 5PM. Reservation # [id number]. CCP notified the patient's nurse Thiago via secure chat and [facility] of discharge/transportation arrangements via telephone. CCP to follow. BRIAN LAYTONW.                   Discharge Codes    No documentation.                 Expected Discharge Date and Time       Expected Discharge Date Expected Discharge Time    May 16, 2024

## 2024-05-16 NOTE — PLAN OF CARE
Goal Outcome Evaluation:  Plan of Care Reviewed With: patient        Progress: improving  Outcome Evaluation: No issues noted during this shift. patient waiting on placement, refferals out. Sling to right arm in place. WCTM.

## 2024-05-17 NOTE — CASE MANAGEMENT/SOCIAL WORK
Case Management Discharge Note      Final Note: Patient discharged to [facility]         Selected Continued Care - Discharged on 5/16/2024 Admission date: 5/9/2024 - Discharge disposition: Home or Self Care      Destination Coordination complete.      Service Provider Selected Services Address Phone Fax Patient Preferred    [facility] [address] [place] [id number] [phone] --              Durable Medical Equipment    No services have been selected for the patient.                Dialysis/Infusion    No services have been selected for the patient.                Home Medical Care    No services have been selected for the patient.                Therapy    No services have been selected for the patient.                Community Resources    No services have been selected for the patient.                Community & DME    No services have been selected for the patient.                         Final Discharge Disposition Code: 03 - skilled nursing facility (SNF)

## 2024-05-20 ENCOUNTER — TELEPHONE (OUTPATIENT)
Dept: NEUROSURGERY | Facility: CLINIC | Age: 64
End: 2024-05-20
Payer: MEDICARE

## 2024-05-20 NOTE — TELEPHONE ENCOUNTER
Spoke with sister regarding scheduling a follow up appointment for patient and sister stated that the patient isn't mobile enough to travel and the nursing home facility doesn't provide transportation. Sister stated that she would call the office after she finds out there's a way to get him the appointment.

## 2024-05-30 ENCOUNTER — OFFICE VISIT (OUTPATIENT)
Dept: CARDIOLOGY | Facility: CLINIC | Age: 64
End: 2024-05-30
Payer: MEDICARE

## 2024-05-30 ENCOUNTER — TELEPHONE (OUTPATIENT)
Dept: NEUROSURGERY | Facility: CLINIC | Age: 64
End: 2024-05-30
Payer: MEDICARE

## 2024-05-30 VITALS
SYSTOLIC BLOOD PRESSURE: 112 MMHG | DIASTOLIC BLOOD PRESSURE: 76 MMHG | HEART RATE: 85 BPM | BODY MASS INDEX: 53.43 KG/M2 | WEIGHT: 313 LBS | HEIGHT: 64 IN

## 2024-05-30 DIAGNOSIS — E78.2 MIXED HYPERLIPIDEMIA: ICD-10-CM

## 2024-05-30 DIAGNOSIS — G47.33 OSA (OBSTRUCTIVE SLEEP APNEA): ICD-10-CM

## 2024-05-30 DIAGNOSIS — I10 PRIMARY HYPERTENSION: ICD-10-CM

## 2024-05-30 DIAGNOSIS — I61.9 INTRAPARENCHYMAL HEMORRHAGE OF BRAIN: ICD-10-CM

## 2024-05-30 DIAGNOSIS — I48.21 PERMANENT ATRIAL FIBRILLATION: Primary | ICD-10-CM

## 2024-05-30 PROCEDURE — 3074F SYST BP LT 130 MM HG: CPT | Performed by: INTERNAL MEDICINE

## 2024-05-30 PROCEDURE — 93000 ELECTROCARDIOGRAM COMPLETE: CPT | Performed by: INTERNAL MEDICINE

## 2024-05-30 PROCEDURE — 99214 OFFICE O/P EST MOD 30 MIN: CPT | Performed by: INTERNAL MEDICINE

## 2024-05-30 PROCEDURE — 3078F DIAST BP <80 MM HG: CPT | Performed by: INTERNAL MEDICINE

## 2024-05-30 NOTE — PROGRESS NOTES
Date of Office Visit: 2024  Encounter Provider: Paulina Charles MD  Place of Service: [facility] CARDIOLOGY  Patient Name: Semaj Narvaez  :1960      Patient ID:  Semaj Narvaez is a 63 y.o. male is here for  followup for atrial fibrillation, intracranial hemorrhage.        History of Present Illness    He has past medical history significant for hyperlipidemia, hypertension, obesity, prediabetes, atrial fibrillation (not on chronic AC), and chronic kidney disease.     He is single, is 1 child, is retired from chemical plant, has never smoked, so couple coffee per day, no alcohol or drugs.  Is currently living at [facility] for rehab.  His mom had myocardial infarction and CABG, dad also had myocardial infarction, 2 sisters had atrial fibrillation.  His mom had strokes and hypertension.  His dad had hypertension and cancer.  3 of his sisters have hypertension and 1 sister with diabetes.  Another sister has had aneurysm.    He presented to the emergency department on 2024 with complaints of right-sided weakness and slurred speech. His blood pressure when EMS arrived was 170/100 with a heart rate of 80. EMS brought him to the emergency department for concerns of stroke. CT head without contrast revealed an avoid acute intraparenchymal hemorrhage that measure 2.2 x 1.8 x 2.5 cm. There were also several punched-out areas of encephalomalacia consistent with small old infarcts. The CT done this morning showed an increase in size of the intraparenchymal hemorrhage as well as some adjacent vasogenic edema.  He was diagnosed with acute left basal ganglia intraparenchymal hemorrhage due to hypertensive emergency.  When he arrived to the ER, he was not on anticoagulation though there was atrial fibrillation.  His heart rate was controlled on metoprolol and carvedilol and he was placed on Cardene.  Echocardiogram done 5/10/2024 showed ejection fraction 61-65% with mild  concentric left ventricle hypertrophy, mild right ventricular dilation with normal right ventricular systolic function, moderate left atrial enlargement, normal saline study, no significant valvular abnormalities.    He is at [facility].When he left the hospital, they recommended a sleep evaluation for undiagnosed sleep apnea.  There is also discussion ongoing about Watchman procedure.    Labs on 5/16/2024 show glucose 104 with otherwise normal BMP.  Lipids on 5/10/2024 show total cholesterol 233, HDL 39, , VLDL 28, triglycerides 150, hemoglobin A1c 6.1%, normal TSH and free T4.    He is fatigued all the time.  He has no lower extremity edema.  He is short winded all the time due to occupational exposures causing chronic nasal and sinus issues.  He is on nasal cannula oxygen since hospitalization.  He has not yet had a sleep study.  He denies chest pain.  He does have dizziness.  Does not feels heart racing or skipping.  He does feel weak.    Past Medical History:   Diagnosis Date    A-fib     Cellulitis and abscess 06/13/2013    DDD (degenerative disc disease), lumbosacral 07/06/2012    Encounter for eye exam 2011    with dilation    Erectile dysfunction     GERD (gastroesophageal reflux disease) 05/31/2013    Hydradenitis 12/22/2008    suppurativa    Hyperlipidemia 05/31/2013    Hypertension     benign essential    Low back pain 05/31/2013    ZORAN (obstructive sleep apnea)     Pneumonia of left lung due to infectious organism 01/12/2017    Spider bite 06/26/2012    with pustular rash surrounding this bite heart deep abrasion         Past Surgical History:   Procedure Laterality Date    HERNIA REPAIR         Current Outpatient Medications on File Prior to Visit   Medication Sig Dispense Refill    amLODIPine (NORVASC) 10 MG tablet Take 1 tablet by mouth Daily. 30 tablet 0    atorvastatin (LIPITOR) 20 MG tablet Take 1 tablet by mouth Every Night. 90 tablet 0    carvedilol (COREG) 6.25 MG tablet Take 1  "tablet by mouth 2 (Two) Times a Day With Meals. 60 tablet 0    fluticasone (FLONASE) 50 MCG/ACT nasal spray 2 sprays into the nostril(s) as directed by provider Daily. 16 g 2    Garlic 10 MG capsule Take  by mouth.      losartan (Cozaar) 50 MG tablet Take 1 tablet by mouth Daily. 90 tablet 2    Omega-3 Fatty Acids (FISH OIL) 1000 MG capsule capsule Take 1 capsule by mouth Daily With Breakfast.      omeprazole OTC (PriLOSEC OTC) 20 MG EC tablet Take 1 tablet by mouth Daily. 90 tablet 1     No current facility-administered medications on file prior to visit.       Social History     Socioeconomic History    Marital status: Single    Number of children: 1   Tobacco Use    Smoking status: Never     Passive exposure: Never    Smokeless tobacco: Never    Tobacco comments:     Caffeine: 1 cup daily   Vaping Use    Vaping status: Never Used   Substance and Sexual Activity    Alcohol use: No    Drug use: No    Sexual activity: Not Currently             Procedures    ECG 12 Lead    Date/Time: 5/30/2024 1:10 PM  Performed by: Paulina Charles MD    Authorized by: Paulina Charles MD  Comparison: compared with previous ECG   Similar to previous ECG  Rhythm: atrial fibrillation  Conduction: right bundle branch block and left anterior fascicular block    Clinical impression: abnormal EKG              Objective:      Vitals:    05/30/24 1256   BP: 112/76   Pulse: 85   Weight: (!) 142 kg (313 lb)   Height: 162.6 cm (64\")     Body mass index is 53.73 kg/m².    Vitals reviewed.   Constitutional:       General: Not in acute distress.     Appearance: Morbidly obese. Not diaphoretic.   Neck:      Vascular: No carotid bruit or JVD.   Pulmonary:      Effort: Pulmonary effort is normal.      Breath sounds: Normal breath sounds.   Cardiovascular:      Normal rate. Irregularly irregular rhythm.      No gallop.    Pulses:     Intact distal pulses.      Carotid: 2+ bilaterally.     Radial: 2+ bilaterally.     Dorsalis pedis: 2+ " bilaterally.     Posterior tibial: 2+ bilaterally.  Edema:     Peripheral edema absent.   Neurological:      Cranial Nerves: No cranial nerve deficit.         Lab Review:       Assessment:      Diagnosis Plan   1. Permanent atrial fibrillation        2. Mixed hyperlipidemia        3. Primary hypertension        4. Intraparenchymal hemorrhage of brain              Acute left basal ganglia intraparenchymal hemorrhage 5/9/2024, stable  Hypertensive emergency causing intraparenchymal hemorrhage and stroke, blood pressure is now treated  Permanent atrial fibrillation, on no anticoagulation now due to intracranial hemorrhage.  Was not on any anticoagulation when he arrived to the hospital 5/9/2024.  Bifascicular block, RBBB and LAFB   Morbid besity  History of prior strokes  Likely ZORAN, will refer back to sleep medicine  Hyperlipidemia, on atorvastatin, lipids need to be rechecked in 3 months with his PCP which I advised him of.     Plan:       He wants to follow-up at the [facility] office with Dr. Castano.  We will make arrangements for that to be done in 3 months.  No medication changes and no other testing at this time.  Overall he is fairly stable.  I did encourage him to work on getting stronger at rehab.

## 2024-05-30 NOTE — TELEPHONE ENCOUNTER
Called and lvm with appt time and date, can be scheduled next available with [facility] if this doesn't work.

## 2024-05-31 DIAGNOSIS — I61.9 INTRAPARENCHYMAL HEMORRHAGE OF BRAIN: Primary | ICD-10-CM

## 2024-05-31 NOTE — TELEPHONE ENCOUNTER
Scheduled for 06/03/2024, patient's sister is arranging transport and will call back to let me know if he can make it Monday

## 2024-05-31 NOTE — TELEPHONE ENCOUNTER
Spoke to patient's sister Ade who called to see if we could put her brother back on the schedule for today at 2:15. He had transportation issues but the facility would be able to transport him to this appointment today, please call Ade and let her know if this is possible as she said that she needs to know something soon in order to let transportation know if he will be going to the appointment today or not, thanks!

## 2024-05-31 NOTE — TELEPHONE ENCOUNTER
Talked to patient is unsure what is going on and states his sister handles his appts. Lvm jl patient's sister dedrick to confirm appt and schedule CTH

## 2024-05-31 NOTE — TELEPHONE ENCOUNTER
Scheduled CT same day of appt due to transport. Patient's sister has been notified of CT and location then come to our office right after. Bria has approved this arrangement.

## 2024-05-31 NOTE — TELEPHONE ENCOUNTER
Spoke to Ade, patient's sister who wanted to know if the patient would be able to be put back onto the schedule for today with Gera at 2:15 because they were able to make arrangements for transportation. Per Evelyn patient cannot be seen today but we can schedule him for 06/06/24, Ade stated that she would have to call us back she cannot schedule right now as she was upset about patient's appointment being cancelled.

## 2024-06-03 NOTE — PROGRESS NOTES
Subjective   Patient ID: Semaj Narvaez is a 63 y.o. male is here today for follow-up on IPH with CT head done today.    Pt had hospital admission 5/10/24-5/16/24 for right sided weakness, slurred speech and was found to have IPH on CT head.      History of Present Illness    Patient presents today in follow-up after hospital admission in early May in which time he was found to have a intraparenchymal hemorrhage.  He was discharged from the hospital to [facility] rehab with plans to return home with caregivers once able.  Patient reports right sided weakness and is working with therapies.  He denies headache, visual changes, dizziness, lightheadedness, nausea, vomiting.  He has no complaints today.  He is taking his BP meds.  He does have hx of A Fib, not anticoagulated prior to IPH.  He is on continuous O2 since being discharged from the hospital.    He presents today with his sister    The following portions of the patient's history were reviewed and updated as appropriate: allergies, current medications, past family history, past medical history, past social history, past surgical history, and problem list.    Review of Systems   Eyes:  Negative for visual disturbance.   Neurological:  Positive for speech difficulty and weakness (Right sided). Negative for dizziness, seizures, light-headedness, numbness and headaches.   Psychiatric/Behavioral:  Negative for confusion.          Objective     Vitals:    06/05/24 1252   BP: 100/70   Pulse: 70   SpO2: 94%   Weight: Comment: wheelchair     There is no height or weight on file to calculate BMI.    Tobacco Use: Low Risk  (6/5/2024)    Patient History     Smoking Tobacco Use: Never     Smokeless Tobacco Use: Never     Passive Exposure: Never          Physical Exam  Vitals reviewed.   Pulmonary:      Effort: Pulmonary effort is normal.   Skin:     General: Skin is warm and dry.   Neurological:      Mental Status: He is alert and oriented to person, place, and time.       Cranial Nerves: Cranial nerves 2-12 are intact.      Coordination: Finger-nose-finger test: Normal on left, unable to perform on right d/t arm weakness.   Psychiatric:         Speech: Speech is slurred.       Neurologic Exam     Mental Status   Oriented to person, place, and time.   Speech: slurred   Level of consciousness: alert    Speech a little slurred, pt states his mouth is dry and that is the cause.  Reports speech is usually more clear.     Cranial Nerves   Cranial nerves II through XII intact.     Motor Exam   RUE & RLE weakness  Normal strength to left upper and lower extremities     Sensory Exam   Light touch normal.     Gait, Coordination, and Reflexes     Coordination   Finger-nose-finger test: Normal on left, unable to perform on right d/t arm weakness.  Gait not tested d/t right sided weakness, pt in wheelchair           Assessment & Plan   Independent Review of Radiographic Studies:      I personally reviewed the images from the following studies.    CT head: When compared to previous study 5/13/2024 there has been evolutionary change to the area of acute IPH centered within the posterior aspect of the left lentiform nucleus. There is no residual or recurrent area of hyperdense hemorrhage.  No new intracranial abnormality seen.    Medical Decision Making:      Patient presents today for hospital follow-up on intraparenchymal hemorrhage from May 9.  [facility] with the hope of returning home with caregivers.  He has residual right upper and lower extremity weakness.  He remains on his blood pressure medicines, does have a history of A-fib, was not anticoagulated prior to the bleed.  CT head performed prior to visit shows no residual or recurrent hemorrhage.  Plan to see patient in follow-up in 1 month with a repeat CT head at that time.  Patient to continue to not take anticoagulants or antiplatelets if at all possible.  If CT head is stable at next visit patient can then  follow-up on an as-needed basis.  He and his sister were instructed to call the office with any questions, concerns or new symptoms.    Diagnoses and all orders for this visit:    1. Intraparenchymal hemorrhage of brain (Primary)  -     CT Head Without Contrast; Future      Return in about 1 month (around 7/5/2024).

## 2024-06-05 ENCOUNTER — OFFICE VISIT (OUTPATIENT)
Dept: NEUROSURGERY | Facility: CLINIC | Age: 64
End: 2024-06-05
Payer: MEDICARE

## 2024-06-05 ENCOUNTER — HOSPITAL ENCOUNTER (OUTPATIENT)
Dept: CT IMAGING | Facility: HOSPITAL | Age: 64
Discharge: HOME | End: 2024-06-05
Payer: MEDICARE

## 2024-06-05 ENCOUNTER — TELEPHONE (OUTPATIENT)
Dept: NEUROSURGERY | Facility: CLINIC | Age: 64
End: 2024-06-05

## 2024-06-05 VITALS — SYSTOLIC BLOOD PRESSURE: 100 MMHG | OXYGEN SATURATION: 94 % | HEART RATE: 70 BPM | DIASTOLIC BLOOD PRESSURE: 70 MMHG

## 2024-06-05 DIAGNOSIS — I61.9 INTRAPARENCHYMAL HEMORRHAGE OF BRAIN: Primary | ICD-10-CM

## 2024-06-05 DIAGNOSIS — I61.9 INTRAPARENCHYMAL HEMORRHAGE OF BRAIN: ICD-10-CM

## 2024-06-05 PROCEDURE — 70450 CT HEAD/BRAIN W/O DYE: CPT

## 2024-06-05 RX ORDER — IBUPROFEN/PSEUDOEPHEDRINE HCL 200MG-30MG
TABLET ORAL
COMMUNITY

## 2024-06-05 RX ORDER — SENNOSIDES A AND B 8.6 MG/1
1 TABLET, FILM COATED ORAL DAILY
COMMUNITY

## 2024-06-05 RX ORDER — ACETAMINOPHEN 325 MG/1
650 TABLET ORAL EVERY 6 HOURS PRN
COMMUNITY

## 2024-06-05 NOTE — TELEPHONE ENCOUNTER
PATIENT' IMELDA NAJERA'S SISTER CALLED STATING THAT SHE WAS TOLD TO CALL US IF THE PT FINISHED HIS CT EARLY AND THAT WE WOULD TRY TO WORK THEM INTO HIS APPOINTMENT WITH US EARLIER. I ATTEMPTED TO W/T BUT THERE WAS NO ANSWER. PLEASE CALL BACK AND LET THEM KNOW IF WE CAN GET THEM I EARLIER. THANKS.    PHONE # [phone]

## 2024-06-11 ENCOUNTER — TELEPHONE (OUTPATIENT)
Dept: NEUROSURGERY | Facility: CLINIC | Age: 64
End: 2024-06-11
Payer: MEDICARE

## 2024-06-11 NOTE — TELEPHONE ENCOUNTER
Patient had scheduled CT on 07/19 per Bria this is too late. I moved up ct and gave new appt date time and location to patient's sister Ade. Informed Ade will call back with appt once July [facility] schedule comes out. She request appt right after CT due to transportation issues

## 2024-07-11 ENCOUNTER — TELEPHONE (OUTPATIENT)
Dept: FAMILY MEDICINE CLINIC | Facility: CLINIC | Age: 64
End: 2024-07-11
Payer: MEDICARE

## 2024-07-11 NOTE — TELEPHONE ENCOUNTER
Caller: SAMAN DUMONT    Relationship:     Best call back number: [phone]    What orders are you requesting (i.e. lab or imaging): SKILLED NURSING, PT, OT AND SOCIAL WORK    In what timeframe would the patient need to come in: ASAP    Where will you receive your lab/imaging services: [facility]    Additional notes: HE IS BEING RELEASED TOMORROW FROM [facility]

## 2024-07-11 NOTE — TELEPHONE ENCOUNTER
Spoke with Toña from home health to give her the verbal ok per Jessica Mishra Verbal orders for skilled nursing, PT, OT and social work- OK

## 2024-07-12 ENCOUNTER — HOSPITAL ENCOUNTER (OUTPATIENT)
Dept: PET IMAGING | Facility: HOSPITAL | Age: 64
Discharge: HOME | End: 2024-07-12
Payer: MEDICARE

## 2024-07-12 ENCOUNTER — OFFICE VISIT (OUTPATIENT)
Dept: NEUROSURGERY | Facility: CLINIC | Age: 64
End: 2024-07-12
Payer: MEDICARE

## 2024-07-12 VITALS
SYSTOLIC BLOOD PRESSURE: 112 MMHG | DIASTOLIC BLOOD PRESSURE: 64 MMHG | OXYGEN SATURATION: 92 % | RESPIRATION RATE: 16 BRPM | HEIGHT: 64 IN | WEIGHT: 265.5 LBS | HEART RATE: 80 BPM | BODY MASS INDEX: 45.33 KG/M2

## 2024-07-12 DIAGNOSIS — I61.9 INTRAPARENCHYMAL HEMORRHAGE OF BRAIN: ICD-10-CM

## 2024-07-12 DIAGNOSIS — I61.9 INTRAPARENCHYMAL HEMORRHAGE OF BRAIN: Primary | ICD-10-CM

## 2024-07-12 PROCEDURE — 70450 CT HEAD/BRAIN W/O DYE: CPT

## 2024-07-12 NOTE — PROGRESS NOTES
"Subjective   Patient ID: Semaj Narvaez is a 63 y.o. male is here today for follow-up with CT head done on 7/12/24.   History of Present Illness    Patient presents today for follow-up for intraparenchymal hemorrhage in early May.  Patient had CT head done prior to office visit.  He is doing remarkably well, was discharged from [facility] rehab today.  He will be going home with a caregiver who will stay at his house.  He will also continue to receive PT, OT and skilled nursing.  He reports some mild improvement in his right sided weakness.  He denies headache, visual change, dizziness, lightheadedness, nausea, vomiting.  He is continuing to take his blood pressure medicines.  He is scheduled to see a cardiology for his history of A-fib next month.  Overall he feels very well and has no complaints today.    Presents today with his sister    The following portions of the patient's history were reviewed and updated as appropriate: allergies, current medications, past family history, past medical history, past social history, past surgical history, and problem list.    Review of Systems   HENT:  Negative for tinnitus.    Eyes:  Negative for visual disturbance.   Gastrointestinal:  Negative for nausea and vomiting.   Musculoskeletal:  Negative for neck pain and neck stiffness.   Neurological:  Positive for weakness. Negative for dizziness, light-headedness, numbness and headaches.   Psychiatric/Behavioral:  Negative for confusion.            Objective     Vitals:    07/12/24 1317   BP: 112/64   Pulse: 80   Resp: 16   SpO2: 92%   Weight: 120 kg (265 lb 8 oz)   Height: 162.6 cm (64\")     Body mass index is 45.57 kg/m².    Tobacco Use: Low Risk  (7/12/2024)    Patient History     Smoking Tobacco Use: Never     Smokeless Tobacco Use: Never     Passive Exposure: Never          Physical Exam  Vitals reviewed.   Constitutional:       Appearance: Normal appearance.   Pulmonary:      Effort: Pulmonary effort is normal.   Skin:     " General: Skin is warm and dry.   Neurological:      Mental Status: He is alert and oriented to person, place, and time.      Cranial Nerves: Cranial nerves 2-12 are intact.      Coordination: Finger-nose-finger test: Able to perform with left hand, unable to perform with right hand due to weakness and in sling.   Psychiatric:         Speech: Speech normal.       Neurologic Exam     Mental Status   Oriented to person, place, and time.   Speech: speech is normal   Level of consciousness: alert  Knowledge: good.     Cranial Nerves   Cranial nerves II through XII intact.     Motor Exam   Right upper and lower extremity weakness, right upper extremity in a sling.  Normal strength to the left upper and lower extremity     Gait, Coordination, and Reflexes     Coordination   Finger-nose-finger test: Able to perform with left hand, unable to perform with right hand due to weakness and in sling.  Patient in wheelchair           Assessment & Plan   Independent Review of Radiographic Studies:      I personally reviewed the images from the following studies.    CT head: when compared to study on 6/5/24 there has been continued evolutionof the IPH involving the basal ganglia.  Residual hematoma has decreased in size. No new hemorrhage, hydrocephalus.     Medical Decision Making:      Patient presents today for hospital follow-up on intraparenchymal hemorrhage from May 9.  [facility] and will be returning to his home with a caregiver who will stay with him.  He has been working with PT and OT while in rehab and is getting some strength back to his right upper extremity.  He continues to take his blood pressure medicines.  He does have history of A-fib and is scheduled to see cardiology next month.  We would recommend he not be on anticoagulants or antiplatelets if at all possible.  CT head shows continued evolution of IPH.  Patient is doing well, I do not think he needs further follow up unless  he were to begin having any symptoms.  He can call the office with any questions or concerns.       Diagnoses and all orders for this visit:    1. Intraparenchymal hemorrhage of brain (Primary)      Return if symptoms worsen or fail to improve.

## 2024-07-15 ENCOUNTER — TELEPHONE (OUTPATIENT)
Dept: FAMILY MEDICINE CLINIC | Facility: CLINIC | Age: 64
End: 2024-07-15

## 2024-07-15 ENCOUNTER — TELEPHONE (OUTPATIENT)
Dept: FAMILY MEDICINE CLINIC | Facility: CLINIC | Age: 64
End: 2024-07-15
Payer: COMMERCIAL

## 2024-07-15 NOTE — TELEPHONE ENCOUNTER
JULIENNE GRIGGS WITH [facility] [phone] CALLED ASKING FOR A VERBAL ORDER FOR THIS PATIENT.  ALSO ASKED FOR THEM TO SEE THE PATIENT 2 TIME A WEEK FOR 3 WEEKS THEN 1 TIME A WEEK FOR 4 WEEKS

## 2024-07-15 NOTE — TELEPHONE ENCOUNTER
PATIENT WAS ADMITTED TO [facility], HE HAD A STROKE. WILL BE GETTING OT AND PT HE CAME HOME JULY 12TH AND WAS ADMITTED TO HOME HEALTH ON THE 13TH

## 2024-07-16 ENCOUNTER — TELEPHONE (OUTPATIENT)
Dept: FAMILY MEDICINE CLINIC | Facility: CLINIC | Age: 64
End: 2024-07-16
Payer: COMMERCIAL

## 2024-07-16 NOTE — TELEPHONE ENCOUNTER
Caller: KEYANNA    Relationship: [facility] call back number: [phone]     What orders are you requesting (i.e. lab or imaging): CONTINUE OCCUPATIONAL THERAPY    In what timeframe would the patient need to come in: 2 TIMES A WEEK FOR 4 WEEKS    Where will you receive your lab/imaging services: IN HOME    Additional notes: REQUESTS CALL BACK WITH VERBAL ORDERS FOR CONTINUED OT

## 2024-07-30 ENCOUNTER — TELEPHONE (OUTPATIENT)
Dept: FAMILY MEDICINE CLINIC | Facility: CLINIC | Age: 64
End: 2024-07-30
Payer: COMMERCIAL

## 2024-07-30 DIAGNOSIS — K21.9 GASTROESOPHAGEAL REFLUX DISEASE, UNSPECIFIED WHETHER ESOPHAGITIS PRESENT: ICD-10-CM

## 2024-07-30 NOTE — TELEPHONE ENCOUNTER
Caller: ROSADOANEL    Relationship: Emergency Contact    Best call back number: [phone]    What medication are you requesting:     carvedilol (COREG) 6.25 MG tablet   atorvastatin (LIPITOR) 20 MG tablet   omeprazole OTC (PriLOSEC OTC) 20 MG EC tablet     amLODIPine (NORVASC) 10 MG tablet     Have you had these symptoms before:    [x] Yes  [] No    Have you been treated for these symptoms before:   [x] Yes  [] No    If a prescription is needed, what is your preferred pharmacy and phone number: [facility] [id number] - [place], [place] - [address] [facility] [address] [facility] [phone] [facility] [phone] FX     Additional notes:  WAS PRESCRIBED THESE AT THE HOSPITAL AFTER HAVING A STROKE.

## 2024-07-31 RX ORDER — CARVEDILOL 6.25 MG/1
6.25 TABLET ORAL 2 TIMES DAILY WITH MEALS
Qty: 60 TABLET | Refills: 0 | Status: SHIPPED | OUTPATIENT
Start: 2024-07-31 | End: 2024-08-05 | Stop reason: SDUPTHER

## 2024-07-31 RX ORDER — OMEPRAZOLE 20 MG/1
20 TABLET, DELAYED RELEASE ORAL DAILY
Qty: 90 TABLET | Refills: 0 | Status: SHIPPED | OUTPATIENT
Start: 2024-07-31 | End: 2024-08-05 | Stop reason: SDUPTHER

## 2024-07-31 RX ORDER — ATORVASTATIN CALCIUM 20 MG/1
20 TABLET, FILM COATED ORAL NIGHTLY
Qty: 90 TABLET | Refills: 0 | Status: SHIPPED | OUTPATIENT
Start: 2024-07-31 | End: 2024-08-05 | Stop reason: SDUPTHER

## 2024-08-05 ENCOUNTER — OFFICE VISIT (OUTPATIENT)
Dept: FAMILY MEDICINE CLINIC | Facility: CLINIC | Age: 64
End: 2024-08-05
Payer: MEDICARE

## 2024-08-05 VITALS
OXYGEN SATURATION: 94 % | HEIGHT: 64 IN | SYSTOLIC BLOOD PRESSURE: 112 MMHG | BODY MASS INDEX: 44.9 KG/M2 | TEMPERATURE: 98.1 F | HEART RATE: 62 BPM | WEIGHT: 263 LBS | RESPIRATION RATE: 16 BRPM | DIASTOLIC BLOOD PRESSURE: 70 MMHG

## 2024-08-05 DIAGNOSIS — J30.2 SEASONAL ALLERGIES: ICD-10-CM

## 2024-08-05 DIAGNOSIS — K21.9 GASTROESOPHAGEAL REFLUX DISEASE, UNSPECIFIED WHETHER ESOPHAGITIS PRESENT: ICD-10-CM

## 2024-08-05 DIAGNOSIS — M65.30 TRIGGER FINGER OF RIGHT HAND, UNSPECIFIED FINGER: ICD-10-CM

## 2024-08-05 DIAGNOSIS — I61.9 INTRAPARENCHYMAL HEMORRHAGE OF BRAIN: ICD-10-CM

## 2024-08-05 DIAGNOSIS — M79.89 SWELLING OF DIGIT OF RIGHT HAND: Primary | ICD-10-CM

## 2024-08-05 DIAGNOSIS — Z09 HOSPITAL DISCHARGE FOLLOW-UP: ICD-10-CM

## 2024-08-05 DIAGNOSIS — R53.1 RIGHT SIDED WEAKNESS: ICD-10-CM

## 2024-08-05 DIAGNOSIS — I10 ESSENTIAL HYPERTENSION: ICD-10-CM

## 2024-08-05 PROCEDURE — 3074F SYST BP LT 130 MM HG: CPT | Performed by: NURSE PRACTITIONER

## 2024-08-05 PROCEDURE — 3078F DIAST BP <80 MM HG: CPT | Performed by: NURSE PRACTITIONER

## 2024-08-05 PROCEDURE — 1126F AMNT PAIN NOTED NONE PRSNT: CPT | Performed by: NURSE PRACTITIONER

## 2024-08-05 PROCEDURE — 1159F MED LIST DOCD IN RCRD: CPT | Performed by: NURSE PRACTITIONER

## 2024-08-05 PROCEDURE — 99214 OFFICE O/P EST MOD 30 MIN: CPT | Performed by: NURSE PRACTITIONER

## 2024-08-05 PROCEDURE — 1111F DSCHRG MED/CURRENT MED MERGE: CPT | Performed by: NURSE PRACTITIONER

## 2024-08-05 PROCEDURE — 1160F RVW MEDS BY RX/DR IN RCRD: CPT | Performed by: NURSE PRACTITIONER

## 2024-08-05 RX ORDER — LOSARTAN POTASSIUM 50 MG/1
50 TABLET ORAL DAILY
Qty: 90 TABLET | Refills: 1 | Status: SHIPPED | OUTPATIENT
Start: 2024-08-05

## 2024-08-05 RX ORDER — CARVEDILOL 6.25 MG/1
6.25 TABLET ORAL 2 TIMES DAILY WITH MEALS
Qty: 180 TABLET | Refills: 1 | Status: SHIPPED | OUTPATIENT
Start: 2024-08-05

## 2024-08-05 RX ORDER — FLUTICASONE PROPIONATE 50 MCG
2 SPRAY, SUSPENSION (ML) NASAL DAILY
Qty: 48 G | Refills: 3 | Status: SHIPPED | OUTPATIENT
Start: 2024-08-05

## 2024-08-05 RX ORDER — OMEPRAZOLE 20 MG/1
20 TABLET, DELAYED RELEASE ORAL DAILY
Qty: 90 TABLET | Refills: 1 | Status: SHIPPED | OUTPATIENT
Start: 2024-08-05

## 2024-08-05 RX ORDER — ATORVASTATIN CALCIUM 20 MG/1
20 TABLET, FILM COATED ORAL NIGHTLY
Qty: 90 TABLET | Refills: 1 | Status: SHIPPED | OUTPATIENT
Start: 2024-08-05

## 2024-08-05 RX ORDER — AMLODIPINE BESYLATE 10 MG/1
10 TABLET ORAL
Qty: 90 TABLET | Refills: 1 | Status: SHIPPED | OUTPATIENT
Start: 2024-08-05

## 2024-08-05 NOTE — PROGRESS NOTES
Subjective   Semaj Narvaez is a 63 y.o. male.     History of Present Illness   Semaj Narvaez 63 y.o. male presents today for hosptial follow up.  he was treated at [facility] from 5/9-5/16/24 for intraparenchymal hemorrhage.  I reviewed all of the labs and diagnostic testing.    The patient's medications were changed:  Current outpatient and discharge medications have been reconciled for the patient.  Reviewed by: KRYSTA Kidd ([place])    he did have a follow up appointment with a specialist: neurosurgery      Hospital discharge note as follows:    Hospital Course  63 y.o. male   64yo gentleman with atrial fibrillation (not on anticoagulation prior to admission), hypertension, and CKD3, who presented to the ER with acute right-sided weakness and slurred speech. He was admitted to ICU with left intraparenchymal hemorrhage. We were asked to take over when pt transitioned out of ICU.  Patient had left subcortical intracerebral hemorrhage with residual right-sided weakness.  Patient has remained stable, blood pressure has also remained stable.  Patient also had atrial fibrillation, currently rate controlled with Coreg, not on anticoagulation.  He will follow-up on outpatient basis with PCP, and with cardiology to discuss anticoagulation.  Patient also had history of GERD, prediabetes, chronic medical conditions.  Overall he has been accepted at rehab facility today.  He will be discharged in a stable condition.  I have seen and examined patient at bedside, total time spent on discharge management is more than 30 minutes.     Patient was discharged to [facility] for inpatient rehab until he was discharged on July 12.  He has been at home with home health for PT, OT.  He does feel that he is improving but still has weakness in his right leg as well as his right arm and hand.  He is able to lift his right arm some but does not have much strength.  He is able to ambulate with a walker but has to do so very slowly and  carefully.  He is continuing to work with PT and OT.  His main complaint is pain in his right hand and swelling.  He reports feeling that his fingers are getting stuck at times.  He request to see a hand specialist for this.      He is also needing refills on his medications today.  Reviewed his medication list from [facility] that he brought with him.  Blood pressure is doing well at 112/70.  He already had follow-up appointment with neurosurgery who felt he was doing well and that he did not need to further follow-up with them unless issues arose.  He had a nurse practitioner through another company that came to his home to assess needs and was going to get him set up with a lift chair but they canceled her services as they did not feel that Medicare would cover and she could not take over his medications.  He is not sure where the process was of getting his lift chair but does have the name of the company that contacted which is [employer].    The following portions of the patient's history were reviewed and updated as appropriate: allergies, current medications, past family history, past medical history, past social history, past surgical history, and problem list.    Review of Systems   Constitutional:  Positive for fatigue.   Respiratory:  Negative for shortness of breath.    Cardiovascular:  Negative for chest pain and palpitations.   Musculoskeletal:  Positive for arthralgias, gait problem and joint swelling.   Skin:  Negative for rash.   Neurological:  Positive for weakness.   Psychiatric/Behavioral:  Negative for dysphoric mood and sleep disturbance. The patient is not nervous/anxious.        Objective   Physical Exam  Vitals and nursing note reviewed.   Constitutional:       Appearance: He is well-developed.   Cardiovascular:      Rate and Rhythm: Normal rate and regular rhythm.   Pulmonary:      Effort: Pulmonary effort is normal.      Breath sounds: Normal breath sounds.    Musculoskeletal:      Right hand: Swelling and tenderness present. Decreased range of motion. Decreased strength.   Neurological:      Mental Status: He is alert and oriented to person, place, and time.   Psychiatric:         Mood and Affect: Mood normal.         Behavior: Behavior normal.         Thought Content: Thought content normal.         Judgment: Judgment normal.         Assessment & Plan   Diagnoses and all orders for this visit:    1. Swelling of digit of right hand (Primary)  -     Diclofenac Sodium (VOLTAREN) 1 % gel gel; Apply 4 g topically to the appropriate area as directed 4 (Four) Times a Day As Needed (right hand pain).  Dispense: 100 g; Refill: 3  -     Ambulatory Referral to Hand Surgery    2. Essential hypertension  -     amLODIPine (NORVASC) 10 MG tablet; Take 1 tablet by mouth Daily.  Dispense: 90 tablet; Refill: 1  -     carvedilol (COREG) 6.25 MG tablet; Take 1 tablet by mouth 2 (Two) Times a Day With Meals.  Dispense: 180 tablet; Refill: 1  -     losartan (Cozaar) 50 MG tablet; Take 1 tablet by mouth Daily.  Dispense: 90 tablet; Refill: 1  -     Comprehensive metabolic panel  -     CBC and Differential  -     TSH    3. Gastroesophageal reflux disease, unspecified whether esophagitis present  Comments:  Avoid triggers-caffeine, nicotine, spicy foods, alcohol, red sauce  Continue omeprazole  Orders:  -     omeprazole OTC (PriLOSEC OTC) 20 MG EC tablet; Take 1 tablet by mouth Daily.  Dispense: 90 tablet; Refill: 1  -     Comprehensive metabolic panel  -     CBC and Differential  -     TSH    4. Intraparenchymal hemorrhage of brain  -     atorvastatin (LIPITOR) 20 MG tablet; Take 1 tablet by mouth Every Night.  Dispense: 90 tablet; Refill: 1  -     Comprehensive metabolic panel  -     CBC and Differential  -     TSH  -     Ambulatory Referral to Home Health    5. Trigger finger of right hand, unspecified finger  -     Diclofenac Sodium (VOLTAREN) 1 % gel gel; Apply 4 g topically to the  appropriate area as directed 4 (Four) Times a Day As Needed (right hand pain).  Dispense: 100 g; Refill: 3  -     Ambulatory Referral to Hand Surgery    6. Hospital discharge follow-up    7. Seasonal allergies  -     fluticasone (FLONASE) 50 MCG/ACT nasal spray; 2 sprays into the nostril(s) as directed by provider Daily.  Dispense: 48 g; Refill: 3    8. Right sided weakness  -     Ambulatory Referral to Home Health

## 2024-08-06 ENCOUNTER — HOME HEALTH ADMISSION (OUTPATIENT)
Dept: HOME HEALTH SERVICES | Facility: HOME HEALTHCARE | Age: 64
End: 2024-08-06
Payer: MEDICARE

## 2024-08-06 LAB
ALBUMIN SERPL-MCNC: 4.2 G/DL (ref 3.5–5.2)
ALBUMIN/GLOB SERPL: 2.5 G/DL
ALP SERPL-CCNC: 115 U/L (ref 39–117)
ALT SERPL-CCNC: 15 U/L (ref 1–41)
AST SERPL-CCNC: 14 U/L (ref 1–40)
BASOPHILS # BLD AUTO: 0.04 10*3/MM3 (ref 0–0.2)
BASOPHILS NFR BLD AUTO: 0.6 % (ref 0–1.5)
BILIRUB SERPL-MCNC: 0.9 MG/DL (ref 0–1.2)
BUN SERPL-MCNC: 18 MG/DL (ref 8–23)
BUN/CREAT SERPL: 17.6 (ref 7–25)
CALCIUM SERPL-MCNC: 9.5 MG/DL (ref 8.6–10.5)
CHLORIDE SERPL-SCNC: 103 MMOL/L (ref 98–107)
CO2 SERPL-SCNC: 26.9 MMOL/L (ref 22–29)
CREAT SERPL-MCNC: 1.02 MG/DL (ref 0.76–1.27)
EGFRCR SERPLBLD CKD-EPI 2021: 81.6 ML/MIN/1.73
EOSINOPHIL # BLD AUTO: 0.18 10*3/MM3 (ref 0–0.4)
EOSINOPHIL NFR BLD AUTO: 2.5 % (ref 0.3–6.2)
ERYTHROCYTE [DISTWIDTH] IN BLOOD BY AUTOMATED COUNT: 13.1 % (ref 12.3–15.4)
GLOBULIN SER CALC-MCNC: 1.7 GM/DL
GLUCOSE SERPL-MCNC: 95 MG/DL (ref 65–99)
HCT VFR BLD AUTO: 43.3 % (ref 37.5–51)
HGB BLD-MCNC: 14 G/DL (ref 13–17.7)
IMM GRANULOCYTES # BLD AUTO: 0.02 10*3/MM3 (ref 0–0.05)
IMM GRANULOCYTES NFR BLD AUTO: 0.3 % (ref 0–0.5)
LYMPHOCYTES # BLD AUTO: 1.5 10*3/MM3 (ref 0.7–3.1)
LYMPHOCYTES NFR BLD AUTO: 20.8 % (ref 19.6–45.3)
MCH RBC QN AUTO: 30.8 PG (ref 26.6–33)
MCHC RBC AUTO-ENTMCNC: 32.3 G/DL (ref 31.5–35.7)
MCV RBC AUTO: 95.2 FL (ref 79–97)
MONOCYTES # BLD AUTO: 0.67 10*3/MM3 (ref 0.1–0.9)
MONOCYTES NFR BLD AUTO: 9.3 % (ref 5–12)
NEUTROPHILS # BLD AUTO: 4.8 10*3/MM3 (ref 1.7–7)
NEUTROPHILS NFR BLD AUTO: 66.5 % (ref 42.7–76)
NRBC BLD AUTO-RTO: 0 /100 WBC (ref 0–0.2)
PLATELET # BLD AUTO: 278 10*3/MM3 (ref 140–450)
POTASSIUM SERPL-SCNC: 4.1 MMOL/L (ref 3.5–5.2)
PROT SERPL-MCNC: 5.9 G/DL (ref 6–8.5)
RBC # BLD AUTO: 4.55 10*6/MM3 (ref 4.14–5.8)
SODIUM SERPL-SCNC: 139 MMOL/L (ref 136–145)
TSH SERPL DL<=0.005 MIU/L-ACNC: 1.3 UIU/ML (ref 0.27–4.2)
WBC # BLD AUTO: 7.21 10*3/MM3 (ref 3.4–10.8)

## 2024-08-08 ENCOUNTER — TELEPHONE (OUTPATIENT)
Dept: FAMILY MEDICINE CLINIC | Facility: CLINIC | Age: 64
End: 2024-08-08
Payer: MEDICARE

## 2024-08-08 NOTE — TELEPHONE ENCOUNTER
Spoke with Samuel from [facility] to give the verbal ok per Jessica Mishra for  OCCUPATIONAL THERAPY TWO TIMES A WEEK FOR TWO WEEKS.

## 2024-08-08 NOTE — TELEPHONE ENCOUNTER
Caller: [facility]    Relationship: Home Health    Best call back number: [phone]     What orders are you requesting (i.e. lab or imaging): OCCUPATIONAL THERAPY TWO TIMES A WEEK FOR TWO WEEKS.    In what timeframe would the patient need to come in: WEEK OF 08-    Where will you receive your lab/imaging services: IN HOME    Additional notes: PLEASE CALL TO GIVE VERBAL ORDERS, PLEASE LEAVE VOICEMAIL IF UNAVAILABLE.

## 2024-08-12 ENCOUNTER — TELEPHONE (OUTPATIENT)
Dept: FAMILY MEDICINE CLINIC | Facility: CLINIC | Age: 64
End: 2024-08-12
Payer: MEDICARE

## 2024-08-12 DIAGNOSIS — M25.60 DECREASED RANGE OF MOTION: Primary | ICD-10-CM

## 2024-08-12 DIAGNOSIS — R26.9 GAIT DISORDER: ICD-10-CM

## 2024-08-12 DIAGNOSIS — R53.1 DECREASED STRENGTH: ICD-10-CM

## 2024-08-12 NOTE — TELEPHONE ENCOUNTER
PATIENTS SISTER STATES THEY ARE TRYING TO GET A LIFT CHAIR FROM [employer] AND THEY ARE NEEDING INFORMATION FAXED. PLEASE ADVISE.     INFORMATION REQUESTED IS:   A NEW REFERRAL   PROGRESS NOTES   FACE TO FACE NOTES     PLEASE FAX TO: [phone]  ATT: XAVIER

## 2024-08-15 ENCOUNTER — TELEPHONE (OUTPATIENT)
Dept: FAMILY MEDICINE CLINIC | Facility: CLINIC | Age: 64
End: 2024-08-15
Payer: MEDICARE

## 2024-08-15 ENCOUNTER — NURSE TRIAGE (OUTPATIENT)
Dept: CALL CENTER | Facility: HOSPITAL | Age: 64
End: 2024-08-15
Payer: MEDICARE

## 2024-08-15 NOTE — TELEPHONE ENCOUNTER
Hub staff attempted to follow warm transfer process and was unsuccessful     Caller: ANEL ROSADO    Relationship to patient: Emergency Contact    Best call back number: [phone]     Patient is needing: PATIENT'S SISTER CALLED BACK BECAUSE SHE HAD NOT HEARD BACK FROM ANYONE ABOUT PATIENT'S LOW BLOOD PRESSURE. I INFORMED HER THEY MAY NOT BE ABLE TO CALL HER BECAUSE SHE IS NOT LISTED ON A  VERBAL. I INFORMED HER TO CHECK WITH PATIENT TO SEE IF HE HAD HEARD FROM OFFICE STAFF.        
Left patient a voicemail to contact our office concerning his low blood pressure.    HUB TO RELAY  
[phone]

## 2024-08-15 NOTE — TELEPHONE ENCOUNTER
Definitely needs to discuss this with cardiology.  I know he has an upcoming appointment on 8/29  I would like him to reach out to discuss/if needed for a sooner appointment if he is having symptoms

## 2024-08-15 NOTE — TELEPHONE ENCOUNTER
Spoke with patient sister to inform her that I left patient a vm to contact our office and also to inform patient per his  provider   Definitely needs to discuss this with cardiology.  I know he has an upcoming   appointment on 8/29  I would like him to reach out to discuss/if   needed for a sooner appointment if he is   having symptoms      She voiced understanding and will contact his cardiologist

## 2024-08-15 NOTE — TELEPHONE ENCOUNTER
"Reason for Disposition   [1] Systolic BP  AND [2] taking blood pressure medications AND [3] NOT dizzy, lightheaded or weak    Additional Information   Negative: Started suddenly after an allergic medicine, an allergic food, or bee sting   Negative: Shock suspected (e.g., cold/pale/clammy skin, too weak to stand, low BP, rapid pulse)   Negative: Difficult to awaken or acting confused (e.g., disoriented, slurred speech)   Negative: Fainted   Negative: [1] Systolic BP < 90 AND [2] dizzy, lightheaded, or weak   Negative: Chest pain   Negative: Bleeding (e.g., vomiting blood, rectal bleeding or tarry stools, severe vaginal bleeding)(Exception: Fainted from sight of small amount of blood; small cut or abrasion.)   Negative: Extra heartbeats, irregular heart beating, or heart is beating very fast  (i.e., \"palpitations\")   Negative: Sounds like a life-threatening emergency to the triager   Negative: [1] Systolic BP < 80 AND [2] NOT dizzy, lightheaded or weak   Negative: Abdominal pain   Negative: Fever > 100.4 F (38.0 C)   Negative: Major surgery in the past month   Negative: [1] Drinking very little AND [2] dehydration suspected (e.g., no urine > 12 hours, very dry mouth, very lightheaded)   Negative: [1] Fall in systolic BP > 20 mm Hg from normal AND [2] dizzy, lightheaded, or weak   Negative: Patient sounds very sick or weak to the triager   Negative: [1] Systolic BP < 90 AND [2] NOT dizzy, lightheaded or weak   Negative: [1] Systolic BP  AND [2] taking blood pressure medications AND [3] dizzy, lightheaded or weak    Answer Assessment - Initial Assessment Questions  1. BLOOD PRESSURE: \"What is the blood pressure?\" \"Did you take at least two measurements 5 minutes apart?\"      Yesterday SBP was in the 80s all day.  Today  117/60  2. ONSET: \"When did you take your blood pressure?\"     2 days  3. HOW: \"How did you obtain the blood pressure?\" (e.g., visiting nurse, automatic home BP monitor)      home  4. " "HISTORY: \"Do you have a history of low blood pressure?\" \"What is your blood pressure normally?\"      Recent brain bleed and put on 3 meds for HTN  5. MEDICINES: \"Are you taking any medications for blood pressure?\" If Yes, ask: \"Have they been changed recently?\"      Carvedilol, amlodipine, losartan  6. PULSE RATE: \"Do you know what your pulse rate is?\"       Afib, rate 60  7. OTHER SYMPTOMS: \"Have you been sick recently?\" \"Have you had a recent injury?\"      Recent brain bleed  8. PREGNANCY: \"Is there any chance you are pregnant?\" \"When was your last menstrual period?\"      na    Protocols used: Blood Pressure - Low-ADULT-AH    "

## 2024-08-21 NOTE — TELEPHONE ENCOUNTER
Face to face sheet, last office visit, client intake forms has all been fax. Dme for lift chair needs to be signed

## 2024-08-26 ENCOUNTER — TELEPHONE (OUTPATIENT)
Dept: FAMILY MEDICINE CLINIC | Facility: CLINIC | Age: 64
End: 2024-08-26

## 2024-08-29 ENCOUNTER — OFFICE VISIT (OUTPATIENT)
Dept: CARDIOLOGY | Facility: CLINIC | Age: 64
End: 2024-08-29
Payer: MEDICARE

## 2024-08-29 VITALS
HEIGHT: 64 IN | HEART RATE: 59 BPM | BODY MASS INDEX: 44.39 KG/M2 | OXYGEN SATURATION: 95 % | SYSTOLIC BLOOD PRESSURE: 146 MMHG | WEIGHT: 260 LBS | DIASTOLIC BLOOD PRESSURE: 90 MMHG

## 2024-08-29 DIAGNOSIS — I10 PRIMARY HYPERTENSION: ICD-10-CM

## 2024-08-29 DIAGNOSIS — I48.21 PERMANENT ATRIAL FIBRILLATION: Primary | ICD-10-CM

## 2024-08-29 NOTE — PROGRESS NOTES
"      CARDIOLOGY    Salbador Castano MD    ENCOUNTER DATE:  08/29/2024    Semaj Narvaez / 63 y.o. / male        CHIEF COMPLAINT / REASON FOR OFFICE VISIT     Permanent atrial fibrillation (05/30/2024 Follow up)  Hypertension    HISTORY OF PRESENT ILLNESS       HPI  Semaj Narvaez is a 63 y.o. male who presents today for reevaluation.  Patient was followed by Dr. Charles in the past.  Patient had atrial fibrillation as well as a stroke.  He lost most of his function on his right side but he is gaining some of it back.  He actually can move his arm and his leg.  His blood pressure been elevated he has been following at home his blood pressure is doing well.  Overall he continues to recover he has no cardiac complaints at the current time.      The following portions of the patient's history were reviewed and updated as appropriate: allergies, current medications, past family history, past medical history, past social history, past surgical history and problem list.      VITAL SIGNS     Visit Vitals  /90 (BP Location: Left arm)   Pulse 59   Ht 162.6 cm (64\")   Wt 118 kg (260 lb)   SpO2 95%   BMI 44.63 kg/m²         Wt Readings from Last 3 Encounters:   08/29/24 118 kg (260 lb)   08/05/24 119 kg (263 lb)   07/12/24 120 kg (265 lb 8 oz)     Body mass index is 44.63 kg/m².      REVIEW OF SYSTEMS   ROS        PHYSICAL EXAMINATION     Vitals reviewed.   Cardiovascular:      Normal rate. Irregularly irregular rhythm. Normal S1. Normal S2.       Murmurs: There is no murmur.      No gallop.  No click. No rub.   Pulses:     Intact distal pulses.   Edema:     Peripheral edema absent.           REVIEWED DATA     Procedures    Cardiac Procedures:      Lipid Panel          4/18/2024    08:54 5/10/2024    02:04   Lipid Panel   Total Cholesterol  233    Total Cholesterol 229     Triglycerides 125  150    HDL Cholesterol 39  39    VLDL Cholesterol 23  28    LDL Cholesterol  167  166    LDL/HDL Ratio  4.21          ASSESSMENT & " PLAN      Diagnosis Plan   1. Permanent atrial fibrillation        2. Primary hypertension              SUMMARY/DISCUSSION  Hypertension patient's blood pressures been very good at home continue the same.  Chronic atrial fibrillation patient had a stroke and remains anticoagulated.  He is slowly recovering from the stroke.  Patient is on Lipitor for his hyperlipidemia.  I would recommend increasing his Lipitor to 40 mg a day but I will defer that to his primary care provider.  His goal LDL should be between 58 and 70.  Follow-up 6 months or sooner if there is issues.  Please note patient wished to change physicians because he lives near [facility] which I have clinic at.        MEDICATIONS         Discharge Medications            Accurate as of August 29, 2024  2:39 PM. If you have any questions, ask your nurse or doctor.                Changes to Medications        Instructions Start Date   Garlic 500 MG capsule  What changed: Another medication with the same name was removed. Continue taking this medication, and follow the directions you see here.  Changed by: Salbador Castano   Oral             Continue These Medications        Instructions Start Date   acetaminophen 325 MG tablet  Commonly known as: TYLENOL   650 mg, Oral, Every 6 Hours PRN      amLODIPine 10 MG tablet  Commonly known as: NORVASC   10 mg, Oral, Every 24 Hours Scheduled      atorvastatin 20 MG tablet  Commonly known as: LIPITOR   20 mg, Oral, Nightly      carvedilol 6.25 MG tablet  Commonly known as: COREG   6.25 mg, Oral, 2 Times Daily With Meals      Diclofenac Sodium 1 % gel gel  Commonly known as: VOLTAREN   4 g, Topical, 4 Times Daily PRN      fish oil 1000 MG capsule capsule   1,000 mg, Oral, Daily With Breakfast      fluticasone 50 MCG/ACT nasal spray  Commonly known as: FLONASE   2 sprays, Nasal, Daily      losartan 50 MG tablet  Commonly known as: Cozaar   50 mg, Oral, Daily      omeprazole OTC 20 MG EC tablet  Commonly known as:  PriLOSEC OTC   20 mg, Oral, Daily      senna 8.6 MG tablet  Commonly known as: SENOKOT   1 tablet, Oral, Daily             Stop These Medications      Melatonin 3 MG tablet dispersible  Stopped by: Salbador Castano     nystatin 100,000 unit/mL suspension  Commonly known as: MYCOSTATIN  Stopped by: Salbador Castano                  **Dragon Disclaimer:   Much of this encounter note is an electronic transcription/translation of spoken language to printed text. The electronic translation of spoken language may permit erroneous, or at times, nonsensical words or phrases to be inadvertently transcribed. Although I have reviewed the note for such errors, some may still exist.

## 2024-09-01 DIAGNOSIS — I10 ESSENTIAL HYPERTENSION: ICD-10-CM

## 2024-09-04 RX ORDER — CARVEDILOL 6.25 MG/1
6.25 TABLET ORAL 2 TIMES DAILY WITH MEALS
Qty: 60 TABLET | Refills: 5 | Status: SHIPPED | OUTPATIENT
Start: 2024-09-04 | End: 2025-03-03

## 2024-09-04 NOTE — TELEPHONE ENCOUNTER
Rx Refill Note  Requested Prescriptions     Pending Prescriptions Disp Refills    carvedilol (COREG) 6.25 MG tablet [Pharmacy Med Name: CARVEDILOL 6.25 MG TABLET] 60 tablet      Sig: TAKE 1 TABLET BY MOUTH TWICE A DAY WITH A MEAL      Last office visit with prescribing clinician: 8/5/2024  Last telemedicine visit with prescribing clinician: Visit date not found   Next office visit with prescribing clinician: Visit date not found                         Would you like a call back once the refill request has been completed: [] Yes [] No    If the office needs to give you a call back, can they leave a voicemail: [] Yes [] No    Dionne Barajas MA  09/04/24, 10:22 EDT

## 2024-09-19 ENCOUNTER — TELEPHONE (OUTPATIENT)
Dept: FAMILY MEDICINE CLINIC | Facility: CLINIC | Age: 64
End: 2024-09-19
Payer: MEDICARE

## 2025-01-08 NOTE — PLAN OF CARE
Goal Outcome Evaluation:                   dc                            Jerri Monzon, Palliative NP Jerri Monzon, Palliative NP

## 2025-01-13 DIAGNOSIS — K21.9 GASTROESOPHAGEAL REFLUX DISEASE, UNSPECIFIED WHETHER ESOPHAGITIS PRESENT: ICD-10-CM

## 2025-01-13 RX ORDER — OMEPRAZOLE 20 MG/1
20 TABLET, DELAYED RELEASE ORAL DAILY
Qty: 90 TABLET | Refills: 1 | Status: SHIPPED | OUTPATIENT
Start: 2025-01-13

## 2025-01-13 NOTE — TELEPHONE ENCOUNTER
Caller: ANEL ROSADO    Relationship: Emergency Contact    Best call back number: [phone]    Requested Prescriptions:   Requested Prescriptions     Pending Prescriptions Disp Refills    omeprazole OTC (PriLOSEC OTC) 20 MG EC tablet 90 tablet 1     Sig: Take 1 tablet by mouth Daily.        Pharmacy where request should be sent: [facility] [id number] [address] - [phone]  - [phone] FX     Last office visit with prescribing clinician: 4/17/2024   Last telemedicine visit with prescribing clinician: Visit date not found   Next office visit with prescribing clinician: Visit date not found         Does the patient have less than a 3 day supply:  [x] Yes  [] No    Olivia Apple Rep   01/13/25 13:27 EST

## 2025-01-13 NOTE — TELEPHONE ENCOUNTER
Rx Refill Note  Requested Prescriptions     Pending Prescriptions Disp Refills    omeprazole OTC (PriLOSEC OTC) 20 MG EC tablet 90 tablet 1     Sig: Take 1 tablet by mouth Daily.      Last office visit with prescribing clinician: 4/17/2024   Last telemedicine visit with prescribing clinician: Visit date not found   Next office visit with prescribing clinician: Visit date not found                         Would you like a call back once the refill request has been completed: [] Yes [] No    If the office needs to give you a call back, can they leave a voicemail: [] Yes [] No    Mayur Ribeiro MA  01/13/25, 15:09 EST

## 2025-03-03 DIAGNOSIS — I10 ESSENTIAL HYPERTENSION: ICD-10-CM

## 2025-03-03 DIAGNOSIS — K21.9 GASTROESOPHAGEAL REFLUX DISEASE, UNSPECIFIED WHETHER ESOPHAGITIS PRESENT: ICD-10-CM

## 2025-03-03 RX ORDER — CARVEDILOL 6.25 MG/1
6.25 TABLET ORAL 2 TIMES DAILY WITH MEALS
Qty: 60 TABLET | Refills: 0 | Status: SHIPPED | OUTPATIENT
Start: 2025-03-03 | End: 2025-08-30

## 2025-03-03 RX ORDER — OMEPRAZOLE 20 MG/1
20 CAPSULE, DELAYED RELEASE ORAL DAILY
Qty: 30 CAPSULE | Refills: 0 | Status: SHIPPED | OUTPATIENT
Start: 2025-03-03

## 2025-03-06 ENCOUNTER — OFFICE VISIT (OUTPATIENT)
Dept: CARDIOLOGY | Facility: CLINIC | Age: 65
End: 2025-03-06
Payer: MEDICARE

## 2025-03-06 VITALS
HEART RATE: 60 BPM | WEIGHT: 271 LBS | OXYGEN SATURATION: 96 % | BODY MASS INDEX: 46.26 KG/M2 | SYSTOLIC BLOOD PRESSURE: 138 MMHG | DIASTOLIC BLOOD PRESSURE: 84 MMHG | HEIGHT: 64 IN

## 2025-03-06 DIAGNOSIS — I10 PRIMARY HYPERTENSION: ICD-10-CM

## 2025-03-06 DIAGNOSIS — I48.21 PERMANENT ATRIAL FIBRILLATION: Primary | ICD-10-CM

## 2025-03-06 NOTE — PROGRESS NOTES
"      CARDIOLOGY    Salbador Castano MD    ENCOUNTER DATE:  03/06/2025    Semaj Narvaez / 64 y.o. / male        CHIEF COMPLAINT / REASON FOR OFFICE VISIT     Permanent atrial fibrillation (08/29/2024 Follow up)  Hypertension    HISTORY OF PRESENT ILLNESS       HPI  Semaj Narvaez is a 64 y.o. male who presents today for reevaluation.  Clinically patient is doing great he denies any types of issues.  He is currently not on a blood thinner he does not want to be on a blood thinner.      The following portions of the patient's history were reviewed and updated as appropriate: allergies, current medications, past family history, past medical history, past social history, past surgical history and problem list.      VITAL SIGNS     Visit Vitals  /84 (BP Location: Left arm)   Pulse 60   Ht 162.6 cm (64\")   Wt 123 kg (271 lb)   SpO2 96%   BMI 46.52 kg/m²         Wt Readings from Last 3 Encounters:   03/06/25 123 kg (271 lb)   08/29/24 118 kg (260 lb)   08/05/24 119 kg (263 lb)     Body mass index is 46.52 kg/m².      REVIEW OF SYSTEMS   ROS        PHYSICAL EXAMINATION     Vitals reviewed.   Constitutional:       Appearance: Healthy appearance.   Pulmonary:      Effort: Pulmonary effort is normal.   Cardiovascular:      Normal rate. Irregularly irregular rhythm. Normal S1. Normal S2.       Murmurs: There is no murmur.      No gallop.  No click. No rub.   Pulses:     Intact distal pulses.   Edema:     Peripheral edema absent.   Neurological:      Mental Status: Alert.           REVIEWED DATA     Procedures    Cardiac Procedures:      Lipid Panel          4/18/2024    08:54 5/10/2024    02:04   Lipid Panel   Total Cholesterol  233    Total Cholesterol 229     Triglycerides 125  150    HDL Cholesterol 39  39    VLDL Cholesterol 23  28    LDL Cholesterol  167  166    LDL/HDL Ratio  4.21          ASSESSMENT & PLAN      Diagnosis Plan   1. Permanent atrial fibrillation        2. Primary hypertension      "         SUMMARY/DISCUSSION  Blood pressures good  Chronic atrial fibrillation.  Patient had a stroke and a head bleed.  I did review neurosurgery's last note where it said it was be preferable if he was not on anticoagulation.  However with the atrial fibrillation he is at increased risk for another neurologic event.  Patient says he does not want to be on a blood thinner.  He said if I prescribe it he would not take it.  I explained the risk and benefit his sister was present during the entire conversation.  Continue the same follow-up in 6 months sooner if issues.        MEDICATIONS         Discharge Medications            Accurate as of March 6, 2025  3:23 PM. If you have any questions, ask your nurse or doctor.                Continue These Medications        Instructions Start Date   acetaminophen 325 MG tablet  Commonly known as: TYLENOL   650 mg, Every 6 Hours PRN      amLODIPine 10 MG tablet  Commonly known as: NORVASC   10 mg, Oral, Every 24 Hours Scheduled      atorvastatin 20 MG tablet  Commonly known as: LIPITOR   20 mg, Oral, Nightly      carvedilol 6.25 MG tablet  Commonly known as: COREG   6.25 mg, Oral, 2 Times Daily With Meals      Diclofenac Sodium 1 % gel gel  Commonly known as: VOLTAREN   4 g, Topical, 4 Times Daily PRN      fish oil 1000 MG capsule capsule   1,000 mg, Daily With Breakfast      fluticasone 50 MCG/ACT nasal spray  Commonly known as: FLONASE   2 sprays, Nasal, Daily      Garlic 500 MG capsule   Take  by mouth.      losartan 50 MG tablet  Commonly known as: Cozaar   50 mg, Oral, Daily      omeprazole 20 MG capsule  Commonly known as: priLOSEC   20 mg, Oral, Daily      senna 8.6 MG tablet  Commonly known as: SENOKOT   1 tablet, Daily                   **Dragon Disclaimer:   Much of this encounter note is an electronic transcription/translation of spoken language to printed text. The electronic translation of spoken language may permit erroneous, or at times, nonsensical words or  phrases to be inadvertently transcribed. Although I have reviewed the note for such errors, some may still exist.

## 2025-03-09 DIAGNOSIS — I61.9 INTRAPARENCHYMAL HEMORRHAGE OF BRAIN: ICD-10-CM

## 2025-03-10 RX ORDER — ATORVASTATIN CALCIUM 20 MG/1
20 TABLET, FILM COATED ORAL NIGHTLY
Qty: 90 TABLET | Refills: 1 | Status: SHIPPED | OUTPATIENT
Start: 2025-03-10

## 2025-03-14 ENCOUNTER — OFFICE VISIT (OUTPATIENT)
Dept: FAMILY MEDICINE CLINIC | Facility: CLINIC | Age: 65
End: 2025-03-14
Payer: MEDICARE

## 2025-03-14 VITALS
HEIGHT: 64 IN | SYSTOLIC BLOOD PRESSURE: 140 MMHG | DIASTOLIC BLOOD PRESSURE: 82 MMHG | WEIGHT: 270 LBS | HEART RATE: 60 BPM | BODY MASS INDEX: 46.1 KG/M2 | OXYGEN SATURATION: 95 % | TEMPERATURE: 95.8 F

## 2025-03-14 DIAGNOSIS — R73.09 ELEVATED GLUCOSE: ICD-10-CM

## 2025-03-14 DIAGNOSIS — I10 ESSENTIAL HYPERTENSION: ICD-10-CM

## 2025-03-14 DIAGNOSIS — R10.9 LEFT SIDED ABDOMINAL PAIN: Primary | ICD-10-CM

## 2025-03-14 DIAGNOSIS — R10.9 LEFT FLANK PAIN: ICD-10-CM

## 2025-03-14 DIAGNOSIS — R53.1 RIGHT SIDED WEAKNESS: ICD-10-CM

## 2025-03-14 DIAGNOSIS — Z12.5 PROSTATE CANCER SCREENING: ICD-10-CM

## 2025-03-14 PROCEDURE — 3077F SYST BP >= 140 MM HG: CPT | Performed by: NURSE PRACTITIONER

## 2025-03-14 PROCEDURE — 1126F AMNT PAIN NOTED NONE PRSNT: CPT | Performed by: NURSE PRACTITIONER

## 2025-03-14 PROCEDURE — 99214 OFFICE O/P EST MOD 30 MIN: CPT | Performed by: NURSE PRACTITIONER

## 2025-03-14 PROCEDURE — 3079F DIAST BP 80-89 MM HG: CPT | Performed by: NURSE PRACTITIONER

## 2025-03-14 NOTE — PROGRESS NOTES
Chief Complaint  Back Pain (left)    Subjective        Back Pain  Associated symptoms include abdominal pain. Pertinent negatives include no dysuria, fever or headaches.      Semaj presents to [facility] as a 64-year-old male complaining of an intermittent ongoing complaint of left sided abdominal and left flank pain  States that it has been ongoing for several months.  Has been worse over the past week  Is better today  He does have a history of constipation.  He feels like symptoms are worse when he has not had a bowel movement.  He does take a stool softener.  He is having bowel movements about every 3 days  He is trying to increase his fluid intake  He denies any fever, no  N/V/D    He is agreeable to labs today and ordering a CT  No acute pain in office today  States that is intermittent as cramping, and feels like a sharp pain in 1 specific area on his left side/flank  Pain is anywhere from 2-6 out of 10  Does not radiate to any other area  He has no dysuria states that he does have frequency but no urgency  No obvious blood in his stool or blood in his urine    He is still walking with a walker.  His mobility is still limited he has weakness on the right side  No longer in physical therapy.  States that they feel he has reached maximal benefit    He does follow-up with all specialist    No other acute complaints today    Review of Systems   Constitutional:  Negative for chills, fatigue and fever.   Respiratory:  Negative for cough, shortness of breath, wheezing and stridor.    Gastrointestinal:  Positive for abdominal pain and constipation. Negative for abdominal distention, anal bleeding, blood in stool, diarrhea, nausea, rectal pain and vomiting.   Endocrine: Positive for polyuria. Negative for cold intolerance, heat intolerance, polydipsia and polyphagia.   Genitourinary:  Positive for frequency. Negative for dysuria and urgency.   Musculoskeletal:  Positive for back pain.  "  Neurological:  Negative for dizziness and light-headedness.        Objective   Vital Signs:   Vitals:    03/14/25 1320   BP: 140/82   Pulse: 60   Temp: 95.8 °F (35.4 °C)   SpO2: 95%   Weight: 122 kg (270 lb)   Height: 162.6 cm (64.02\")   PainSc: 0-No pain            3/14/2025     1:22 PM   PHQ-2/PHQ-9 Depression Screening   Little interest or pleasure in doing things Not at all   Feeling down, depressed, or hopeless Not at all   How difficult have these problems made it for you to do your work, take care of things at home, or get along with other people? Not difficult at all               Physical Exam  Vitals reviewed.   Constitutional:       General: He is not in acute distress.  Eyes:      Conjunctiva/sclera: Conjunctivae normal.   Neck:      Thyroid: No thyromegaly.      Vascular: No carotid bruit.   Cardiovascular:      Rate and Rhythm: Normal rate and regular rhythm.      Heart sounds: Normal heart sounds. No murmur heard.  Pulmonary:      Effort: Pulmonary effort is normal. No respiratory distress.      Breath sounds: Normal breath sounds. No stridor. No wheezing, rhonchi or rales.   Chest:      Chest wall: No tenderness.   Abdominal:      General: Abdomen is flat. Bowel sounds are normal. There is no distension.      Palpations: Abdomen is soft. There is no mass.      Tenderness: There is no abdominal tenderness. There is no right CVA tenderness, left CVA tenderness, guarding or rebound.      Hernia: No hernia is present.       Neurological:      Mental Status: He is alert and oriented to person, place, and time.   Psychiatric:         Attention and Perception: Attention normal.         Mood and Affect: Mood normal.          Result Review :     The following data was reviewed by: KRYSTA Alarcon on 03/14/2025:  TSH (08/05/2024 14:44)  CBC and Differential (08/05/2024 14:44)  Comprehensive metabolic panel (08/05/2024 14:44)    Labs look good.      Assessment and Plan       Left sided abdominal " pain  To Er with any severe pain  Ordered labs and Ct today  Orders:    CT Abdomen Pelvis With & Without Contrast; Future    Comprehensive Metabolic Panel    CBC & Differential    PSA Screen    Hemoglobin A1c    Urinalysis With Culture If Indicated -    Left flank pain  To Er with any severe pain  Ordered labs and Ct today  No hx kidney stones  Orders:    CT Abdomen Pelvis With & Without Contrast; Future    Comprehensive Metabolic Panel    CBC & Differential    PSA Screen    Hemoglobin A1c    Urinalysis With Culture If Indicated -    Prostate cancer screening  Checking PSA with labs    Orders:    Comprehensive Metabolic Panel    CBC & Differential    PSA Screen    Hemoglobin A1c    Urinalysis With Culture If Indicated -    Essential hypertension  Hypertension is stable and controlled  Continue current treatment regimen.  Blood pressure will be reassessed in 6 months.    Orders:    Comprehensive Metabolic Panel    CBC & Differential    PSA Screen    Hemoglobin A1c    Urinalysis With Culture If Indicated -    Elevated glucose  Check Ha1c with labs  Watch carb and sugar intake  Polyuria    Orders:    Comprehensive Metabolic Panel    CBC & Differential    PSA Screen    Hemoglobin A1c    Urinalysis With Culture If Indicated -    Right sided weakness  Continue current management               Follow Up   No follow-ups on file.  Patient was given instructions and counseling regarding his condition or for health maintenance advice. Please see specific information pulled into the AVS if appropriate.

## 2025-03-15 LAB
ALBUMIN SERPL-MCNC: 4.3 G/DL (ref 3.9–4.9)
ALP SERPL-CCNC: 100 IU/L (ref 44–121)
ALT SERPL-CCNC: 18 IU/L (ref 0–44)
AST SERPL-CCNC: 13 IU/L (ref 0–40)
BASOPHILS # BLD AUTO: 0.1 X10E3/UL (ref 0–0.2)
BASOPHILS NFR BLD AUTO: 1 %
BILIRUB SERPL-MCNC: 0.7 MG/DL (ref 0–1.2)
BUN SERPL-MCNC: 16 MG/DL (ref 8–27)
BUN/CREAT SERPL: 13 (ref 10–24)
CALCIUM SERPL-MCNC: 9.4 MG/DL (ref 8.6–10.2)
CHLORIDE SERPL-SCNC: 102 MMOL/L (ref 96–106)
CO2 SERPL-SCNC: 26 MMOL/L (ref 20–29)
CREAT SERPL-MCNC: 1.28 MG/DL (ref 0.76–1.27)
EGFRCR SERPLBLD CKD-EPI 2021: 62 ML/MIN/1.73
EOSINOPHIL # BLD AUTO: 0.2 X10E3/UL (ref 0–0.4)
EOSINOPHIL NFR BLD AUTO: 2 %
ERYTHROCYTE [DISTWIDTH] IN BLOOD BY AUTOMATED COUNT: 12.8 % (ref 11.6–15.4)
GLOBULIN SER CALC-MCNC: 2 G/DL (ref 1.5–4.5)
GLUCOSE SERPL-MCNC: 92 MG/DL (ref 70–99)
GLUCOSE UR QL STRIP: NORMAL
HBA1C MFR BLD: 5.9 % (ref 4.8–5.6)
HCT VFR BLD AUTO: 46.3 % (ref 37.5–51)
HGB BLD-MCNC: 15.4 G/DL (ref 13–17.7)
IMM GRANULOCYTES # BLD AUTO: 0 X10E3/UL (ref 0–0.1)
IMM GRANULOCYTES NFR BLD AUTO: 0 %
KETONES UR QL STRIP: NORMAL
LYMPHOCYTES # BLD AUTO: 1.7 X10E3/UL (ref 0.7–3.1)
LYMPHOCYTES NFR BLD AUTO: 25 %
MCH RBC QN AUTO: 31.2 PG (ref 26.6–33)
MCHC RBC AUTO-ENTMCNC: 33.3 G/DL (ref 31.5–35.7)
MCV RBC AUTO: 94 FL (ref 79–97)
MONOCYTES # BLD AUTO: 0.6 X10E3/UL (ref 0.1–0.9)
MONOCYTES NFR BLD AUTO: 8 %
NEUTROPHILS # BLD AUTO: 4.4 X10E3/UL (ref 1.4–7)
NEUTROPHILS NFR BLD AUTO: 64 %
PH UR STRIP: NORMAL [PH]
PLATELET # BLD AUTO: 244 X10E3/UL (ref 150–450)
POTASSIUM SERPL-SCNC: 4.8 MMOL/L (ref 3.5–5.2)
PROT SERPL-MCNC: 6.3 G/DL (ref 6–8.5)
PROT UR QL STRIP: NORMAL
PSA SERPL-MCNC: 1.2 NG/ML (ref 0–4)
RBC # BLD AUTO: 4.94 X10E6/UL (ref 4.14–5.8)
SODIUM SERPL-SCNC: 142 MMOL/L (ref 134–144)
SP GR UR STRIP: NORMAL
UNABLE TO VOID: NORMAL
WBC # BLD AUTO: 6.9 X10E3/UL (ref 3.4–10.8)

## 2025-03-17 ENCOUNTER — RESULTS FOLLOW-UP (OUTPATIENT)
Dept: FAMILY MEDICINE CLINIC | Facility: CLINIC | Age: 65
End: 2025-03-17
Payer: MEDICARE

## 2025-03-17 NOTE — LETTER
Semaj Narvaez  [address] [facility] [address]    March 26, 2025     Dear Mr. Narvaez:    Below are the results from your recent visit:    Resulted Orders   Comprehensive Metabolic Panel   Result Value Ref Range    Glucose 92 70 - 99 mg/dL    BUN 16 8 - 27 mg/dL    Creatinine 1.28 (H) 0.76 - 1.27 mg/dL    EGFR Result 62 >59 mL/min/1.73    BUN/Creatinine Ratio 13 10 - 24    Sodium 142 134 - 144 mmol/L    Potassium 4.8 3.5 - 5.2 mmol/L    Chloride 102 96 - 106 mmol/L    Total CO2 26 20 - 29 mmol/L    Calcium 9.4 8.6 - 10.2 mg/dL    Total Protein 6.3 6.0 - 8.5 g/dL    Albumin 4.3 3.9 - 4.9 g/dL    Globulin 2.0 1.5 - 4.5 g/dL    Total Bilirubin 0.7 0.0 - 1.2 mg/dL    Alkaline Phosphatase 100 44 - 121 IU/L    AST (SGOT) 13 0 - 40 IU/L    ALT (SGPT) 18 0 - 44 IU/L   CBC & Differential   Result Value Ref Range    WBC 6.9 3.4 - 10.8 x10E3/uL    RBC 4.94 4.14 - 5.80 x10E6/uL    Hemoglobin 15.4 13.0 - 17.7 g/dL    Hematocrit 46.3 37.5 - 51.0 %    MCV 94 79 - 97 fL    MCH 31.2 26.6 - 33.0 pg    MCHC 33.3 31.5 - 35.7 g/dL    RDW 12.8 11.6 - 15.4 %    Platelets 244 150 - 450 x10E3/uL    Neutrophil Rel % 64 Not Estab. %    Lymphocyte Rel % 25 Not Estab. %    Monocyte Rel % 8 Not Estab. %    Eosinophil Rel % 2 Not Estab. %    Basophil Rel % 1 Not Estab. %    Neutrophils Absolute 4.4 1.4 - 7.0 x10E3/uL    Lymphocytes Absolute 1.7 0.7 - 3.1 x10E3/uL    Monocytes Absolute 0.6 0.1 - 0.9 x10E3/uL    Eosinophils Absolute 0.2 0.0 - 0.4 x10E3/uL    Basophils Absolute 0.1 0.0 - 0.2 x10E3/uL    Immature Granulocyte Rel % 0 Not Estab. %    Immature Grans Absolute 0.0 0.0 - 0.1 x10E3/uL   PSA Screen   Result Value Ref Range    PSA 1.2 0.0 - 4.0 ng/mL      Comment:      Roche ECLIA methodology.  According to the American Urological Association, Serum PSA should  decrease and remain at undetectable levels after radical  prostatectomy. The AUA defines biochemical recurrence as an initial  PSA value 0.2 ng/mL or greater followed by a subsequent  confirmatory  PSA value 0.2 ng/mL or greater.  Values obtained with different assay methods or kits cannot be used  interchangeably. Results cannot be interpreted as absolute evidence  of the presence or absence of malignant disease.     Hemoglobin A1c   Result Value Ref Range    Hemoglobin A1C 5.9 (H) 4.8 - 5.6 %      Comment:               Prediabetes: 5.7 - 6.4           Diabetes: >6.4           Glycemic control for adults with diabetes: <7.0     Urinalysis With Culture If Indicated -   Result Value Ref Range    Specific Gravity, UA CANCELED       Comment:      Test not performed. Patient was unable to provide a self-collected  specimen for the requested testing. The following test(s) were not  performed:    Result canceled by the ancillary.      pH, UA CANCELED       Comment:      Test not performed    Result canceled by the ancillary.      Protein CANCELED       Comment:      Test not performed    Result canceled by the ancillary.      Glucose, UA CANCELED       Comment:      Test not performed    Result canceled by the ancillary.      Ketones CANCELED       Comment:      Test not performed    Result canceled by the ancillary.     Unable To Void   Result Value Ref Range    Unable to Void Comment       Comment:      The patient was not able to render a urine sample and has been  instructed to return for a urine collection at their earliest  convenience.  The urine testing that you have requested has  been deleted from this report.  When the patient returns and  provides a urine specimen, the urine testing will be performed  and separately reported.         your lab results are back!    Normal kidney, and liver enzymes,  Thyroid normal, not anemic  No concerns with blood sugar-your hemoglobin A1c/3-month blood sugar average is down to 5.9 this is fantastic  Prostate screening is normal        No major concerns on labs.    If you have any questions or concerns, please don't hesitate to call.          Sincerely,        KRYSTA Alarcon

## 2025-03-17 NOTE — PROGRESS NOTES
Good Morning Semaj, your lab results are back!    Normal kidney, and liver enzymes,  Thyroid normal, not anemic  No concerns with blood sugar-your hemoglobin A1c/3-month blood sugar average is down to 5.9 this is fantastic  Prostate screening is normal      No major concerns on labs.  Let me know if you have any questions or concerns  Jessica

## 2025-03-20 ENCOUNTER — PRIOR AUTHORIZATION (OUTPATIENT)
Dept: FAMILY MEDICINE CLINIC | Facility: CLINIC | Age: 65
End: 2025-03-20
Payer: MEDICARE

## 2025-03-20 NOTE — TELEPHONE ENCOUNTER
Semaj Narvaez (Morel: [id number]) - [id number]  Omeprazole 20MG dr tablets  status: PA RequestCreated: March 10th, 2025 [phone]: March 20th, 2025

## 2025-04-09 ENCOUNTER — TELEPHONE (OUTPATIENT)
Dept: FAMILY MEDICINE CLINIC | Facility: CLINIC | Age: 65
End: 2025-04-09
Payer: MEDICARE

## 2025-04-09 NOTE — TELEPHONE ENCOUNTER
Spoke with patient sister she stated that her brother received a letter in the mail from his part d plan regarding his medication omeprazole (priLOSEC) 20 MG capsule the insurance is stating they will no longer be covering medication due to it being over the counter. Patient is requesting for an alternative medication to be prescribed

## 2025-04-10 ENCOUNTER — PRIOR AUTHORIZATION (OUTPATIENT)
Dept: FAMILY MEDICINE CLINIC | Facility: CLINIC | Age: 65
End: 2025-04-10
Payer: MEDICARE

## 2025-04-10 NOTE — TELEPHONE ENCOUNTER
HUB TO RELAY   Left patient sister (Ade)a voicemail to inform that her message was sent over to patient provider regarding replacing the omeprazole per his provider patient can buy medication over the counter. I also [facility] and that was completed to do and someone from our office will contact will approval/denial once updated

## 2025-04-10 NOTE — TELEPHONE ENCOUNTER
Semaj Narvaez (Morel: [id number]) - [id number]  Omeprazole 20MG dr tablets  status: PA RequestCreated: April 3rd, 2025 [phone]: April 10th, 2025

## 2025-04-15 ENCOUNTER — HOSPITAL ENCOUNTER (OUTPATIENT)
Dept: CT IMAGING | Facility: HOSPITAL | Age: 65
Discharge: HOME | End: 2025-04-15
Admitting: NURSE PRACTITIONER
Payer: MEDICARE

## 2025-04-15 DIAGNOSIS — R10.9 LEFT SIDED ABDOMINAL PAIN: ICD-10-CM

## 2025-04-15 DIAGNOSIS — R10.9 LEFT FLANK PAIN: ICD-10-CM

## 2025-04-15 PROCEDURE — 25510000001 IOPAMIDOL 61 % SOLUTION: Performed by: NURSE PRACTITIONER

## 2025-04-15 PROCEDURE — 74178 CT ABD&PLV WO CNTR FLWD CNTR: CPT

## 2025-04-15 RX ORDER — IOPAMIDOL 612 MG/ML
85 INJECTION, SOLUTION INTRAVASCULAR
Status: COMPLETED | OUTPATIENT
Start: 2025-04-15 | End: 2025-04-15

## 2025-04-15 RX ADMIN — IOPAMIDOL 85 ML: 612 INJECTION, SOLUTION INTRAVENOUS at 15:46

## 2025-04-22 ENCOUNTER — TELEPHONE (OUTPATIENT)
Dept: FAMILY MEDICINE CLINIC | Facility: CLINIC | Age: 65
End: 2025-04-22
Payer: MEDICARE

## 2025-04-22 DIAGNOSIS — N20.0 KIDNEY STONE: Primary | ICD-10-CM

## 2025-04-22 NOTE — TELEPHONE ENCOUNTER
HUB TO RELAY  Left patient sister a voicemail to inform her per patient provider   I do not need to see him for 3 months if no problems  Urology will call to set up an appointment      Patient has been scheduled for 7/22 @245 instructed to contact our office if date or time doesn't work

## 2025-04-22 NOTE — TELEPHONE ENCOUNTER
Just received results-  does show a 10 mm kidney stone-  placing referral to urology to discuss/ follow up    Not sure what medication he is needing  Only medication I received was for omeprazole and he can get OTC if not covered by insurance

## 2025-04-22 NOTE — TELEPHONE ENCOUNTER
Caller: ANEL ROSADO    Relationship: Emergency Contact    Best call back number:     [phone]       What is the best time to reach you: AFTER 12    Who are you requesting to speak with (clinical staff, provider,  specific staff member): PEYTON BATES        What was the call regarding: SISTER IS WANTING TO GET PATIENT'S CT RESULTS. SHE ALSO STATED PCP TOLD PATIENT HE WOULD NEED TO COME BACK IN FOR AN APPOINTMENT FOR MEDICATIONS, POSSIBLY. SHE WAS NOT CLEAR. SHE IS WANTING TO KNOW WHY HE WOULD NEED TO COME BACK IN SINCE HE WAS SEEN ON 03/14/25. PLEASE CALL TO ADVISE    Is it okay if the provider responds through MyChart: PHONE CALL-VOICEMAIL IF NEEDED

## 2025-04-28 DIAGNOSIS — I61.9 INTRAPARENCHYMAL HEMORRHAGE OF BRAIN: ICD-10-CM

## 2025-04-28 RX ORDER — ATORVASTATIN CALCIUM 20 MG/1
20 TABLET, FILM COATED ORAL NIGHTLY
Qty: 90 TABLET | Refills: 1 | OUTPATIENT
Start: 2025-04-28

## 2025-05-06 ENCOUNTER — TELEPHONE (OUTPATIENT)
Dept: CARDIOLOGY | Age: 65
End: 2025-05-06
Payer: MEDICARE

## 2025-05-06 NOTE — TELEPHONE ENCOUNTER
Pt needs clearance for surgery tomorrow.  He is having a ureteroscopy laser lithotripsy under general anesthesia.  LOV 03/06/25,  NOV 09/16/25    Fax to:  [facility]  [phone]    Thanks,  Jaki

## 2025-06-04 ENCOUNTER — TELEPHONE (OUTPATIENT)
Dept: FAMILY MEDICINE CLINIC | Facility: CLINIC | Age: 65
End: 2025-06-04
Payer: MEDICARE

## 2025-06-04 NOTE — TELEPHONE ENCOUNTER
Caller: ANEL ROSADO    Relationship: Emergency Contact    Best call back number: [phone]    Caller requesting test results: ANEL ROSADO    What test was performed: CT ABDOMEN    When was the test performed: 04/15/25    Where was the test performed: [facility]    Additional notes: PATIENTS SISTER IS CALLING TO STATE PATIENT HAD A ABDOMINAL CT SCAN.  SHE STATES PATIENT RECEIVED A LETTER THAT HAD A DIAGNOSIS OF ATELECTASIS IN THE LUNG BASE AREA.  SHE STATES HE STILL IS HAVING SOME PAIN AND WANTS TO KNOW IF THE ATELECTASIS COULD CAUSE THE PAIN.  HE WOULD LIKE A CALL FROM MS BATES TO DISCUSS.      SHE STATES THAT [facility] IS REQUESTING A NEW ORDER FOR OXYGEN IF MS BATES WANTS HIM TO CONTINUE.  SHE STATES HE HAS NOT BEEN USING THE OXYGEN.  IF SHE DOES NOT WANT HIM TO CONTINUE, HE WILL NEED AN ORDER TO DISCONTINUE  THE OXYGEN.    PLEASE ADVISE.

## 2025-06-18 ENCOUNTER — TELEPHONE (OUTPATIENT)
Dept: FAMILY MEDICINE CLINIC | Facility: CLINIC | Age: 65
End: 2025-06-18
Payer: MEDICARE

## 2025-06-18 NOTE — TELEPHONE ENCOUNTER
HUB TO RELAY  Left patient sister (Ade) a voicemail to inform her per patient provider Jessica Mishra patient would need to be seen in office have not seen since 3/2025 for any orders to discontinue O2 order. Patient sister was advise to contact our office to schedule an appt.

## 2025-06-18 NOTE — TELEPHONE ENCOUNTER
Caller: ANEL ROSADO    Relationship: Emergency Contact    Best call back number: [phone]     What test was performed: CT LEFT SIDE ABDOMEN    When was the test performed: 04/15/2025    Where was the test performed: [facility]     Additional notes: PATIENTS SISTER STATES THAT THE PATIENT HAD A CT DONE IN MARCH OF HIS LEFT SIDE.     PATIENT WAS FOUND TO HAVE A KIDNEY STONE WHICH HE HAS SINCE HAD ADDRESSED, HOWEVER ON HIS RESULTS THERE WAS STATED THAT HE HAD A MILD ATELECTASIS IN THE DEPENDENT PORTION OF THE LUNG.     PATIENTS SISTER WOULD LIKE SOMEONE TO CALL HER TO DISCUSS THIS AND GO OVER WHAT THIS RESULT MEANS.     PATIENT HAD A BRAIN INJURY AND IT IS EASIER TO DISCUSS THIS WITH HIS SISTER WHO IS ON HIS VERBAL RELEASE.     PLEASE CALL TO DISCUSS.

## 2025-06-18 NOTE — TELEPHONE ENCOUNTER
Caller: ROSADOANEL    Relationship: Emergency Contact    Best call back number: [phone]     What orders are you requesting (i.e. lab or imaging): EITHER TO DISCONTINUE OXYGEN OR TO KEEP OXYGEN    Where will you receive your lab/imaging services: [facility]    Additional notes: PATIENTS  STATES THAT HER OTHER SISTER WAS CONTACTED BY [facility] TO FOLLOW UP ON AN ORDER FOR THIS PATIENTS OXYGEN.     [facility] STATES THAT IF HE WILL NEED TO CONTINUE HIS OXYGEN THERAPIES THAT HE WILL NEED A NEW ORDER FOR THIS.     PATIENTS SISTER WAS ALSO TOLD THAT HE MAY NEED AN ORDER TO DISCONTINUE THIS.     PATIENTS SISTER IS CALLING TO SEE HOW THE PROVIDER WOULD LIKE TO PROCEED SO THAT THE PATIENT IS NOT CHARGED.     PATIENTS SISTER STATES THAT PATIENT HAS NOT BEEN CURRENTLY USING HIS OXYGEN TO HER KNOWLEDGE.     PLEASE CALL PATIENTS SISTER TO DISCUSS AND ADVISE.

## 2025-07-13 DIAGNOSIS — I10 ESSENTIAL HYPERTENSION: ICD-10-CM

## 2025-07-14 RX ORDER — LOSARTAN POTASSIUM 50 MG/1
50 TABLET ORAL DAILY
Qty: 14 TABLET | Refills: 0 | Status: SHIPPED | OUTPATIENT
Start: 2025-07-14

## 2025-07-22 ENCOUNTER — OFFICE VISIT (OUTPATIENT)
Dept: FAMILY MEDICINE CLINIC | Facility: CLINIC | Age: 65
End: 2025-07-22
Payer: MEDICARE

## 2025-07-22 VITALS
SYSTOLIC BLOOD PRESSURE: 136 MMHG | DIASTOLIC BLOOD PRESSURE: 84 MMHG | HEART RATE: 60 BPM | HEIGHT: 64 IN | WEIGHT: 275.4 LBS | TEMPERATURE: 96.5 F | BODY MASS INDEX: 47.02 KG/M2 | OXYGEN SATURATION: 95 %

## 2025-07-22 DIAGNOSIS — E78.2 MIXED HYPERLIPIDEMIA: ICD-10-CM

## 2025-07-22 DIAGNOSIS — R53.1 RIGHT SIDED WEAKNESS: ICD-10-CM

## 2025-07-22 DIAGNOSIS — I61.9 INTRAPARENCHYMAL HEMORRHAGE OF BRAIN: ICD-10-CM

## 2025-07-22 DIAGNOSIS — N18.31 STAGE 3A CHRONIC KIDNEY DISEASE: ICD-10-CM

## 2025-07-22 DIAGNOSIS — I10 ESSENTIAL HYPERTENSION: ICD-10-CM

## 2025-07-22 DIAGNOSIS — K21.9 GASTROESOPHAGEAL REFLUX DISEASE, UNSPECIFIED WHETHER ESOPHAGITIS PRESENT: ICD-10-CM

## 2025-07-22 DIAGNOSIS — I48.0 PAROXYSMAL ATRIAL FIBRILLATION: ICD-10-CM

## 2025-07-22 DIAGNOSIS — Z00.00 MEDICARE ANNUAL WELLNESS VISIT, SUBSEQUENT: Primary | ICD-10-CM

## 2025-07-22 DIAGNOSIS — R73.03 PREDIABETES: ICD-10-CM

## 2025-07-22 PROCEDURE — 1170F FXNL STATUS ASSESSED: CPT | Performed by: NURSE PRACTITIONER

## 2025-07-22 PROCEDURE — G0439 PPPS, SUBSEQ VISIT: HCPCS | Performed by: NURSE PRACTITIONER

## 2025-07-22 PROCEDURE — G2211 COMPLEX E/M VISIT ADD ON: HCPCS | Performed by: NURSE PRACTITIONER

## 2025-07-22 PROCEDURE — 3075F SYST BP GE 130 - 139MM HG: CPT | Performed by: NURSE PRACTITIONER

## 2025-07-22 PROCEDURE — 99214 OFFICE O/P EST MOD 30 MIN: CPT | Performed by: NURSE PRACTITIONER

## 2025-07-22 PROCEDURE — 3079F DIAST BP 80-89 MM HG: CPT | Performed by: NURSE PRACTITIONER

## 2025-07-22 PROCEDURE — 1126F AMNT PAIN NOTED NONE PRSNT: CPT | Performed by: NURSE PRACTITIONER

## 2025-07-22 RX ORDER — AMLODIPINE BESYLATE 10 MG/1
10 TABLET ORAL
Qty: 90 TABLET | Refills: 1 | Status: SHIPPED | OUTPATIENT
Start: 2025-07-22

## 2025-07-22 RX ORDER — CARVEDILOL 6.25 MG/1
6.25 TABLET ORAL 2 TIMES DAILY WITH MEALS
Qty: 180 TABLET | Refills: 1 | Status: SHIPPED | OUTPATIENT
Start: 2025-07-22 | End: 2026-01-18

## 2025-07-22 RX ORDER — ATORVASTATIN CALCIUM 20 MG/1
20 TABLET, FILM COATED ORAL NIGHTLY
Qty: 90 TABLET | Refills: 1 | Status: SHIPPED | OUTPATIENT
Start: 2025-07-22

## 2025-07-22 RX ORDER — ASPIRIN 81 MG/1
81 TABLET, CHEWABLE ORAL DAILY
COMMUNITY

## 2025-07-22 RX ORDER — OMEPRAZOLE 20 MG/1
20 CAPSULE, DELAYED RELEASE ORAL DAILY
Qty: 90 CAPSULE | Refills: 2 | Status: SHIPPED | OUTPATIENT
Start: 2025-07-22

## 2025-07-22 RX ORDER — LOSARTAN POTASSIUM 50 MG/1
50 TABLET ORAL DAILY
Qty: 90 TABLET | Refills: 2 | Status: SHIPPED | OUTPATIENT
Start: 2025-07-22
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
## 2025-07-25 DIAGNOSIS — I10 ESSENTIAL HYPERTENSION: ICD-10-CM

## 2025-07-25 RX ORDER — AMLODIPINE BESYLATE 10 MG/1
10 TABLET ORAL
Qty: 90 TABLET | Refills: 1 | OUTPATIENT
Start: 2025-07-25

## 2025-07-25 RX ORDER — LOSARTAN POTASSIUM 50 MG/1
50 TABLET ORAL DAILY
Qty: 14 TABLET | OUTPATIENT
Start: 2025-07-25